# Patient Record
Sex: FEMALE | Race: WHITE | Employment: OTHER | URBAN - METROPOLITAN AREA
[De-identification: names, ages, dates, MRNs, and addresses within clinical notes are randomized per-mention and may not be internally consistent; named-entity substitution may affect disease eponyms.]

---

## 2017-01-05 ENCOUNTER — HOSPITAL ENCOUNTER (OUTPATIENT)
Dept: SLEEP MEDICINE | Age: 67
Discharge: HOME | End: 2017-01-05
Payer: MEDICARE

## 2017-01-05 PROCEDURE — 95810 POLYSOM 6/> YRS 4/> PARAM: CPT

## 2017-03-30 ENCOUNTER — HOSPITAL ENCOUNTER (OUTPATIENT)
Dept: CT IMAGING | Age: 67
Discharge: HOME | End: 2017-03-30
Attending: INTERNAL MEDICINE
Payer: MEDICARE

## 2017-03-30 DIAGNOSIS — J30.89 SEASONAL ALLERGIC RHINITIS DUE TO OTHER ALLERGIC TRIGGER: Chronic | ICD-10-CM

## 2017-03-30 PROCEDURE — 70486 CT MAXILLOFACIAL W/O DYE: CPT

## 2017-10-09 ENCOUNTER — HOSPITAL ENCOUNTER (OUTPATIENT)
Dept: MAMMOGRAPHY | Age: 67
Discharge: HOME | End: 2017-10-09
Attending: INTERNAL MEDICINE
Payer: MEDICARE

## 2017-10-09 DIAGNOSIS — Z12.31 VISIT FOR SCREENING MAMMOGRAM: ICD-10-CM

## 2017-10-09 PROCEDURE — 77063 BREAST TOMOSYNTHESIS BI: CPT

## 2018-03-13 ENCOUNTER — HOSPITAL ENCOUNTER (OUTPATIENT)
Dept: SURGERY | Age: 68
Discharge: HOME | End: 2018-03-13
Payer: MEDICARE

## 2018-03-13 ENCOUNTER — HOSPITAL ENCOUNTER (OUTPATIENT)
Dept: PHYSICAL THERAPY | Age: 68
Discharge: HOME | End: 2018-03-13
Payer: MEDICARE

## 2018-03-13 VITALS
OXYGEN SATURATION: 99 % | DIASTOLIC BLOOD PRESSURE: 68 MMHG | HEIGHT: 66 IN | TEMPERATURE: 96.7 F | RESPIRATION RATE: 16 BRPM | WEIGHT: 174.6 LBS | HEART RATE: 78 BPM | SYSTOLIC BLOOD PRESSURE: 113 MMHG | BODY MASS INDEX: 28.06 KG/M2

## 2018-03-13 LAB
ANION GAP SERPL CALC-SCNC: 10 MMOL/L (ref 7–16)
APPEARANCE UR: CLEAR
APTT PPP: 31.6 SEC (ref 23.2–35.3)
BACTERIA SPEC CULT: NORMAL
BASOPHILS # BLD: 0 K/UL (ref 0–0.2)
BASOPHILS NFR BLD: 1 % (ref 0–2)
BILIRUB UR QL: NEGATIVE
BUN SERPL-MCNC: 16 MG/DL (ref 8–23)
CALCIUM SERPL-MCNC: 9.2 MG/DL (ref 8.3–10.4)
CHLORIDE SERPL-SCNC: 103 MMOL/L (ref 98–107)
CO2 SERPL-SCNC: 28 MMOL/L (ref 21–32)
COLOR UR: YELLOW
CREAT SERPL-MCNC: 1.05 MG/DL (ref 0.6–1)
DIFFERENTIAL METHOD BLD: NORMAL
EOSINOPHIL # BLD: 0.1 K/UL (ref 0–0.8)
EOSINOPHIL NFR BLD: 2 % (ref 0.5–7.8)
ERYTHROCYTE [DISTWIDTH] IN BLOOD BY AUTOMATED COUNT: 13.9 % (ref 11.9–14.6)
GLUCOSE SERPL-MCNC: 91 MG/DL (ref 65–100)
GLUCOSE UR STRIP.AUTO-MCNC: NEGATIVE MG/DL
HCT VFR BLD AUTO: 41.6 % (ref 35.8–46.3)
HGB BLD-MCNC: 13.3 G/DL (ref 11.7–15.4)
HGB UR QL STRIP: NEGATIVE
IMM GRANULOCYTES # BLD: 0 K/UL (ref 0–0.5)
IMM GRANULOCYTES NFR BLD AUTO: 0 % (ref 0–5)
INR PPP: 1
KETONES UR QL STRIP.AUTO: NEGATIVE MG/DL
LEUKOCYTE ESTERASE UR QL STRIP.AUTO: NEGATIVE
LYMPHOCYTES # BLD: 1.6 K/UL (ref 0.5–4.6)
LYMPHOCYTES NFR BLD: 35 % (ref 13–44)
MCH RBC QN AUTO: 27.7 PG (ref 26.1–32.9)
MCHC RBC AUTO-ENTMCNC: 32 G/DL (ref 31.4–35)
MCV RBC AUTO: 86.7 FL (ref 79.6–97.8)
MONOCYTES # BLD: 0.4 K/UL (ref 0.1–1.3)
MONOCYTES NFR BLD: 8 % (ref 4–12)
NEUTS SEG # BLD: 2.4 K/UL (ref 1.7–8.2)
NEUTS SEG NFR BLD: 54 % (ref 43–78)
NITRITE UR QL STRIP.AUTO: NEGATIVE
PH UR STRIP: 5.5 [PH] (ref 5–9)
PLATELET # BLD AUTO: 258 K/UL (ref 150–450)
PMV BLD AUTO: 11.2 FL (ref 10.8–14.1)
POTASSIUM SERPL-SCNC: 3.6 MMOL/L (ref 3.5–5.1)
PROT UR STRIP-MCNC: NEGATIVE MG/DL
PROTHROMBIN TIME: 12.9 SEC (ref 11.5–14.5)
RBC # BLD AUTO: 4.8 M/UL (ref 4.05–5.25)
SERVICE CMNT-IMP: NORMAL
SODIUM SERPL-SCNC: 141 MMOL/L (ref 136–145)
SP GR UR REFRACTOMETRY: 1 (ref 1–1.02)
UROBILINOGEN UR QL STRIP.AUTO: 0.2 EU/DL (ref 0.2–1)
WBC # BLD AUTO: 4.5 K/UL (ref 4.3–11.1)

## 2018-03-13 PROCEDURE — 87641 MR-STAPH DNA AMP PROBE: CPT | Performed by: ORTHOPAEDIC SURGERY

## 2018-03-13 PROCEDURE — 85730 THROMBOPLASTIN TIME PARTIAL: CPT | Performed by: ORTHOPAEDIC SURGERY

## 2018-03-13 PROCEDURE — 80048 BASIC METABOLIC PNL TOTAL CA: CPT | Performed by: ORTHOPAEDIC SURGERY

## 2018-03-13 PROCEDURE — 85025 COMPLETE CBC W/AUTO DIFF WBC: CPT | Performed by: ORTHOPAEDIC SURGERY

## 2018-03-13 PROCEDURE — G8979 MOBILITY GOAL STATUS: HCPCS

## 2018-03-13 PROCEDURE — 81003 URINALYSIS AUTO W/O SCOPE: CPT | Performed by: ORTHOPAEDIC SURGERY

## 2018-03-13 PROCEDURE — G8980 MOBILITY D/C STATUS: HCPCS

## 2018-03-13 PROCEDURE — 97161 PT EVAL LOW COMPLEX 20 MIN: CPT

## 2018-03-13 PROCEDURE — G8978 MOBILITY CURRENT STATUS: HCPCS

## 2018-03-13 PROCEDURE — 85610 PROTHROMBIN TIME: CPT | Performed by: ORTHOPAEDIC SURGERY

## 2018-03-13 RX ORDER — FLUTICASONE PROPIONATE 50 MCG
2 SPRAY, SUSPENSION (ML) NASAL DAILY
COMMUNITY
End: 2018-07-26 | Stop reason: SDUPTHER

## 2018-03-13 NOTE — PROGRESS NOTES
03/13/18 0900   Oxygen Therapy   O2 Sat (%) 96 %   Pulse via Oximetry 74 beats per minute   O2 Device Room air   Pre-Treatment   Breath Sounds Bilateral Clear   Pre FEV1 (liters) 2.3 liters   % Predicted 119  (Pt states she has been doing a lot of swimming)   Incentive Spirometry Treatment   Actual Volume (ml) 3000 ml  (upfrom last [address] of [address])     Initial respiratory Assessment completed with pt. Pt was interviewed and evaluated in Joint camp prior to surgery. Patient ID:  Gregg Guzman  [id number]  79 y.o.  1950  Surgeon: Dr. Alexandro Palacios  Date of Surgery: 4/5/2018  Procedure: Total Left Hip Arthroplasty  Primary Care Physician: Curry Cevallos -[phone]  Specialists:                                  Pt instructed in the use of Incentive Spirometry. Pt instructed to bring Incentive Spirometer back on date of surgery & to start using Is upon return to pt room. Pt taught proper cough technique    History of smoking:   NONE                                       Quit date:            Secondhand smoke:FATHER      Past procedures with Oxygen desaturation:PT HAD JULIA DONE LAST JOINT CAMP ON 11/9/2016 WITH DESATURATIONS . PT HAS SLEEP STUDY ON 1/5/2017 WITH RESULTS SHOWING LOW SAT OF 85%.       Past Medical History:   Diagnosis Date    Anxiety state, unspecified 06/24/2011    Resolved     Chronic rhinitis 10/01/12    Disorder of bone and cartilage, unspecified 06/24/13    Dysfunction of eustachian tube 06/09/10    GERD (gastroesophageal reflux disease)     managed with medication     HLD (hyperlipidemia)     no meds currently    MR (mitral regurgitation)     hx of ECHO (7/25/2016)- mild MR, mild TR    Other synovitis and tenosynovitis 06/23/10    Peripheral vascular disease, unspecified 06/24/2011    pt denies    Seasonal allergic rhinitis     managed with medication     Unspecified tinnitus 06/13/2012    constant Respiratory history:DENIES SOB                                                             Respiratory meds:                                         FAMILY PRESENT:                                                          NO                                        PAST SLEEP STUDY:        YES                   1/5/2017   HX OF ARNOL:                  MILD WITH LOW SAT OF 85%. PT WOULD LIKE TO FOLLOW UP ON DR WESTFALL'S RECOMMENDATIONS AS NOTED ON SLEEP STUDY- ORAL APPLIANCE OR SLEEP POSITIONING. ARNOL assessment:                                               SLEEPS ON SIDE                                                         PHYSICAL EXAM   Body mass index is 28.61 kg/(m^2). Visit Vitals    /68 (BP 1 Location: Right arm, BP Patient Position: At rest;Sitting)    Pulse 78    Temp 96.7 °F (35.9 °C)    Resp 16    Ht 5' 5.5\" (1.664 m)    Wt 79.2 kg (174 lb 9.6 oz)    SpO2 99%    BMI 28.61 kg/m2     Neck circumference:   36   cm    Loud snoring:        YES                               Witnessed apnea or wakening gasping or choking:,             DENIES,                                                                                                WAKES SELF UP SNORING    Awakens with headaches:                                                  DENIES    Morning or daytime tiredness/ sleepiness:                                                                                                          TIRED   Dry mouth or sore throat in morning:           SOME                                                                       Talamantes stage:  4    SACS score:3    STOP/BANG:3                              CPAP:                       NONE                                                 O2 AT 3 LPM HS. DO NOT USE CONT SAT MONITOR PER PT REQUEST. Referrals:  [address]  Pt.  Phone Number:  [phone]    PT DESIRES CONSULTATION TO BE DONE THE FIRST OF June 2018

## 2018-03-13 NOTE — PERIOP NOTES
Patient verified name, , and surgery as listed in [facility]. Type 3 surgery, PAT joint assessment complete. Labs per surgeon: cbc, bmp, PT, PTT, MRSA nasal swab, UA; results pending. Labs per anesthesia protocol: no additional labs needed. EKG:Done 17 at [facility]- copy was not scanned into CC and was requested from office. Note in CC states EKG was NSR. Hibiclens and instructions to return bottle on DOS given per hospital policy. Nasal Swab collected per MD order and instructions for Mupirocin nasal ointment if required. Patient provided with handouts including Guide to Surgery, Pain Management, Hand Hygiene, Blood Transfusion Education, and [place] [facility] Brochure. Patient answered medical/surgical history questions at their best of ability. All prior to admission medications documented in [facility]. Original medication prescription bottle was visualized during patient appointment. Patient instructed to hold all vitamins 7 days prior to surgery and NSAIDS 5 days prior to surgery. Medications to be held: vitamins and supplements. Patient instructed to continue previous medications as prescribed prior to surgery and to take the following medications the day of surgery according to anesthesia guidelines with a small sip of water: flonase, zyrtec and nexium. Patient teach back successful and patient demonstrates knowledge of instruction.

## 2018-03-13 NOTE — ADVANCED PRACTICE NURSE
Total Joint Surgery Preoperative Chart Review      Patient ID:  Julian Joshua  [id number]  16 y.o.  1950  Surgeon: Dr. Jareth Liz  Date of Surgery: 4/5/2018  Procedure: Total Left Hip Arthroplasty  Primary Care Physician: Sunita Nicholas -[phone]  Specialty Physician(s):      Subjective: Julian Joshua is a 69 y.o. WHITE OR  female who presents for preoperative evaluation for Total Left Hip arthroplasty. This is a preoperative chart review note based on data collected by the nurse at the surgical Pre-Assessment visit.     Past Medical History:   Diagnosis Date    Anxiety state, unspecified 06/24/2011    Resolved     Chronic rhinitis 10/01/12    Disorder of bone and cartilage, unspecified 06/24/13    Dysfunction of eustachian tube 06/09/10    GERD (gastroesophageal reflux disease)     managed with medication     HLD (hyperlipidemia)     no meds currently    MR (mitral regurgitation)     hx of ECHO (7/25/2016)- mild MR, mild TR    Other synovitis and tenosynovitis 06/23/10    Peripheral vascular disease, unspecified 06/24/2011    pt denies    Seasonal allergic rhinitis     managed with medication     Unspecified tinnitus 06/13/2012    constant      Past Surgical History:   Procedure Laterality Date    HX COLONOSCOPY  1/23/2012    diverticulosis, internal hemorrhoids, Dr. Rodo Bustamante Right 11/2016    HX HYSTERECTOMY  2003    GIRISH    HX TONSILLECTOMY  childhood     Family History   Problem Relation Age of Onset    Cancer Mother 36     breast    Breast Cancer Mother 36    No Known Problems Daughter     No Known Problems Father     MS Brother     No Known Problems Brother     No Known Problems Daughter     No Known Problems Son       Social History   Substance Use Topics    Smoking status: Never Smoker    Smokeless tobacco: Never Used    Alcohol use 1.2 oz/week     2 Glasses of wine per week       Prior to Admission medications    Medication Sig Start Date End Date Taking? Authorizing Provider   fluticasone (FLONASE ALLERGY RELIEF) 50 mcg/actuation nasal spray 2 Sprays by Both Nostrils route daily. Yes Historical Provider   montelukast (SINGULAIR) 10 mg tablet Take 1 Tab by mouth daily. Indications: ALLERGIC RHINITIS  Patient taking differently: Take 10 mg by mouth nightly. Indications: Allergic Rhinitis 7/12/17  Yes Alirio Manzo NP   acetaminophen (TYLENOL EXTRA STRENGTH) 500 mg tablet Take 1,000 mg by mouth every six (6) hours as needed for Pain. 12/13/16  Yes Denise Herman MD   psyllium 0.52 gram capsule Take 1 Cap by mouth daily (with lunch). Yes Denise Herman MD   calcium-cholecalciferol, d3, (CALCIUM 600 + D) 600-125 mg-unit tab Take 1 Tab by mouth two (2) times a day. Yes Historical Provider   esomeprazole (NEXIUM) 20 mg capsule Take 1 Cap by mouth daily. 6/30/15  Yes Alirio Manzo NP   multivitamin (ONE A DAY) tablet Take 1 Tab by mouth every morning. Yes Historical Provider   omega-3 fatty acids-vitamin e (FISH OIL) 1,000 mg cap Take 2 Caps by mouth two (2) times a day. Yes Historical Provider   Biotin 2,500 mcg cap Take 1 Tab by mouth nightly. Yes Historical Provider   ciprofloxacin HCl (CIPRO) 500 mg tablet Take 1 Tab by mouth two (2) times a day. Patient not taking: Reported on 3/13/2018 12/29/17   Alirio Manzo NP   azelastine (ASTELIN) 137 mcg (0.1 %) nasal spray 1 [place] by Both Nostrils route two (2) times a day. Use in each nostril as directed  Patient not taking: Reported on 3/13/2018 12/29/17   Alirio Manzo NP   promethazine (PHENERGAN) 25 mg tablet Take 1 Tab by mouth every six (6) hours as needed for Nausea. Patient not taking: Reported on 3/13/2018 7/19/17   Alirio Manzo NP   fluticasone (FLONASE) 50 mcg/actuation nasal spray 2 Sprays by Both Nostrils route once for 1 dose.  Indications: ALLERGIC RHINITIS 7/12/17 7/12/17  Kenton Bors, NP   cetirizine (ZYRTEC) 10 mg tablet Take 10 mg by mouth daily. Historical Provider     Allergies   Allergen Reactions    Sulfa (Sulfonamide Antibiotics) Hives and Rash          Objective:     Physical Exam:   Patient Vitals for the past 24 hrs:   Temp Pulse Resp BP SpO2   03/13/18 1031 96.7 °F (35.9 °C) 78 16 113/68 99 %   03/13/18 0900 - - - - 96 %       ECG:    EKG Results     None          Data Review:   Labs:   Recent Results (from the past 24 hour(s))   CBC WITH AUTOMATED DIFF    Collection Time: 03/13/18  9:22 AM   Result Value Ref Range    WBC 4.5 4.3 - 11.1 K/uL    RBC 4.80 4.05 - 5.25 M/uL    HGB 13.3 11.7 - 15.4 g/dL    HCT 41.6 35.8 - 46.3 %    MCV 86.7 79.6 - 97.8 FL    MCH 27.7 26.1 - 32.9 PG    MCHC 32.0 31.4 - 35.0 g/dL    RDW 13.9 11.9 - 14.6 %    PLATELET 977 276 - 681 K/uL    MPV 11.2 10.8 - 14.1 FL    DF AUTOMATED      NEUTROPHILS 54 43 - 78 %    LYMPHOCYTES 35 13 - 44 %    MONOCYTES 8 4.0 - 12.0 %    EOSINOPHILS 2 0.5 - 7.8 %    BASOPHILS 1 0.0 - 2.0 %    IMMATURE GRANULOCYTES 0 0.0 - 5.0 %    ABS. NEUTROPHILS 2.4 1.7 - 8.2 K/UL    ABS. LYMPHOCYTES 1.6 0.5 - 4.6 K/UL    ABS. MONOCYTES 0.4 0.1 - 1.3 K/UL    ABS. EOSINOPHILS 0.1 0.0 - 0.8 K/UL    ABS. BASOPHILS 0.0 0.0 - 0.2 K/UL    ABS. IMM.  GRANS. 0.0 0.0 - 0.5 K/UL   METABOLIC PANEL, BASIC    Collection Time: 03/13/18  9:22 AM   Result Value Ref Range    Sodium 141 136 - 145 mmol/L    Potassium 3.6 3.5 - 5.1 mmol/L    Chloride 103 98 - 107 mmol/L    CO2 28 21 - 32 mmol/L    Anion gap 10 7 - 16 mmol/L    Glucose 91 65 - 100 mg/dL    BUN 16 8 - 23 MG/DL    Creatinine 1.05 (H) 0.6 - 1.0 MG/DL    GFR est AA >60 >60 ml/min/1.73m2    GFR est non-AA 55 (L) >60 ml/min/1.73m2    Calcium 9.2 8.3 - 10.4 MG/DL   PROTHROMBIN TIME + INR    Collection Time: 03/13/18  9:22 AM   Result Value Ref Range    Prothrombin time 12.9 11.5 - 14.5 sec    INR 1.0     PTT    Collection Time: 03/13/18  9:22 AM   Result Value Ref Range    aPTT 31.6 23.2 - 35.3 SEC   URINALYSIS W/ RFLX MICROSCOPIC    Collection Time: 03/13/18  9:22 AM   Result Value Ref Range    Color YELLOW      Appearance CLEAR      Specific gravity 1.003 1.001 - 1.023      pH (UA) 5.5 5.0 - 9.0      Protein NEGATIVE  NEG mg/dL    Glucose NEGATIVE  mg/dL    Ketone NEGATIVE  NEG mg/dL    Bilirubin NEGATIVE  NEG      Blood NEGATIVE  NEG      Urobilinogen 0.2 0.2 - 1.0 EU/dL    Nitrites NEGATIVE  NEG      Leukocyte Esterase NEGATIVE  NEG           Problem List:  )  Patient Active Problem List   Diagnosis Code    Seasonal allergic rhinitis J30.2    HLD (hyperlipidemia) E78.5    GERD (gastroesophageal reflux disease) K21.9    MR (mitral regurgitation) I34.0    Cyst of tendon sheath M67.80    Fatigue R53.83    Snoring R06.83    CKD (chronic kidney disease) stage 3, GFR 30-59 ml/min N18.3    Osteoarthritis M19.90    History of total right hip arthroplasty Z96.641       Total Joint Surgery Pre-Assessment Recommendations:            Patient had sleep study 1/5/2017 showing low saturations 85%. The notes wanted her to follow up. She did not get that information, therefore, She did not follow up. We will refer for consult with sleep medicine physican. Recommend oxygen saturation monitoring during hospitalization and oxygen at 3 liter via [facility]. Patient with multiple comorbidities including:  Age greater than 72 with CKD3, GERD, anxiety and a sleep disorder.   Patient would benefit from inpatient hospitalization with total knee surgery.          Signed By: SCOTT rCuz    March 13, 2018

## 2018-03-13 NOTE — PROGRESS NOTES
Gregg Guzman  : [birth year](88 y.o.) [facility] at [address], [place] [address].  [phone]       Physical Therapy Prehab Plan of Treatment and Evaluation Summary:3/13/2018    ICD-10: Treatment Diagnosis:   · Pain in left hip (M25.552)  · Stiffness of Left Hip, Not elsewhere classified (M25.652)  · Difficulty in walking, Not elsewhere classified (R26.2)  Precautions/Allergies:   Sulfa (sulfonamide antibiotics)  MEDICAL/REFERRING DIAGNOSIS:  Unilateral primary osteoarthritis, left hip [M16.12]  REFERRING PHYSICIAN: Austine Goltz, MD  DATE OF SURGERY: 18   Assessment:   Comments:  Patient presents prior to L ERNIE. Patient underwent R ERNIE in  and reports it is doing \"great\". She notes pain in her L groin as well as the outside of the thigh/hip. Patient's goal is to return to normal activity. She is active and swims 3x/week. She plans on returning home at d/c with assist from her children. PROBLEM LIST (Impacting functional limitations):  Ms. Jarred Erwin presents with the following left lower extremity(s) problems:  1. Strength  2. Range of Motion  3. Home Exercise Program  4. Pain   INTERVENTIONS PLANNED:  1. Home Exercise Program  2. Educational Discussion     TREATMENT PLAN: Effective Dates: 3/13/2018 TO 3/13/2018. Frequency/Duration: Patient to continue to perform home exercise program at least twice per day up until her surgery. GOALS: (Goals have been discussed and agreed upon with patient.)  Discharge Goals: Time Frame: 1 Day  1. Patient will demonstrate independence with a home exercise program designed to increase strength, range of motion, balance, coordination, functional technique and pain control to minimize functional deficits and optimize patient for total joint replacement.   Rehabilitation Potential For Stated Goals: Good  Regarding Estella Hines's therapy, I certify that the treatment plan above will be carried out by a therapist or under their direction. Thank you for this referral,  Eliazar Porter PT               HISTORY:   Present Symptoms:  Pain Intensity 1: 5 (worst)  Pain Location 1: Hip  Pain Orientation 1: Left   History of Present Injury/Illness (Reason for Referral):  Medical/Referring Diagnosis: Unilateral primary osteoarthritis, left hip [M16.12]   Past Medical History/Comorbidities:   Ms. Manfred Mcmillan  has a past medical history of Anxiety state, unspecified (06/24/2011); Chronic rhinitis (10/01/12); Disorder of bone and cartilage, unspecified (06/24/13); Dysfunction of eustachian tube (06/09/10); GERD (gastroesophageal reflux disease); HLD (hyperlipidemia); MR (mitral regurgitation); Other synovitis and tenosynovitis (06/23/10); Peripheral vascular disease, unspecified (06/24/2011); Seasonal allergic rhinitis; and Unspecified tinnitus (06/13/2012). She also has no past medical history of Difficult intubation; Malignant hyperthermia due to anesthesia; Nausea & vomiting; Pseudocholinesterase deficiency; or Unspecified adverse effect of anesthesia. Ms. Manfred Mcmillan  has a past surgical history that includes hx colonoscopy (1/23/2012); hx tonsillectomy (childhood); hx hysterectomy (2003); and hx hip replacement (Right, 11/2016). Social History/Living Environment:   Home Environment: Private residence  # Steps to Enter: 3  Rails to Enter: No  One/Two Story Residence: One story  Living Alone: Yes  Support Systems: Child(karly)  Patient Expects to be Discharged to[de-identified] Private residence  Current DME Used/Available at Home: [address] [facility], [place], straight, Commode, bedside, Crutches, [address] [facility] chair, Walker, rolling  Tub or Shower Type: Tub/Shower combination  Work/Activity:  Patient is independent with mobility. She is retired.   Dominant Side:  RIGHT  Current Medications:  See Pre-assessment nursing note   Number of Personal Factors/Comorbidities that affect the Plan of Care: 0: LOW COMPLEXITY   EXAMINATION:   ADLs (Current Functional Status):   Ambulation:  [x] Independent  [] Walk Indoors Only  [] Walk Outdoors  [] Use Assistive Device  [] Use Wheelchair Only Dressing:  [x] Independent  Requires Assistance from Someone for:  [] Sock/Shoes  [] Pants  [] Everything   Bathing/Showering:   [x] Independent  [] Requires Assistance from Someone  [] Sponge Bath Only Household Activities:  [x] Routine house and yard work  [] Light Housework Only  [] None   Observation/Orthostatic Postural Assessment:       ROM/Flexibility:   Gross Assessment: Yes  AROM: Within functional limits (R LE)                LLE Assessment  LLE Assessment (WDL): Exception to WDL          Strength:   Gross Assessment: Yes  Strength: Within functional limits (R LE 5/5)       LLE Strength  L Hip Flexion: 5  L Hip ABduction: 5  L Knee Flexion: 5  L Knee Extension: 5  L Ankle Dorsiflexion: 5          Functional Mobility:    Gross Assessment: Yes  Coordination: Within functional limits  Sensation: Intact  Tone: Normal    Gait Description (WDL): Exceptions to WDL  Stand to Sit: Independent  Sit to Stand: Independent  Stand Pivot Transfers: Independent  Distance (ft): 1000 Feet (ft)  Ambulation - Level of Assistance: Independent  Gait Abnormalities: Antalgic          Balance:    Sitting: Intact  Standing: Intact   Body Structures Involved:  1. Joints  2. Muscles Body Functions Affected:  1. Movement Related Activities and Participation Affected:  1. Mobility   Number of elements that affect the Plan of Care: 4+: HIGH COMPLEXITY   CLINICAL PRESENTATION:   Presentation: Stable and uncomplicated: LOW COMPLEXITY   CLINICAL DECISION MAKING:   Outcome Measure: Tool Used: Lower Extremity Functional Scale (LEFS)  Score:  Initial: 45/80 Most Recent: X/80 (Date: -- )   Interpretation of Score: 20 questions each scored on a 5 point scale with 0 representing \"extreme difficulty or unable to perform\" and 4 representing \"no difficulty\". The lower the score, the greater the functional disability.  80/80 represents no disability. Minimal detectable change is 9 points. Score 80 79-65 64-49 48-33 32-17 16-1 0   Modifier CH CI CJ CK CL CM CN     ? Mobility - Walking and Moving Around:     - CURRENT STATUS: CK - 40%-59% impaired, limited or restricted    - GOAL STATUS: CK - 40%-59% impaired, limited or restricted    - D/C STATUS:  CK - 40%-59% impaired, limited or restricted  Medical Necessity:   · Ms. Hines is expected to optimize her lower extremity strength and ROM in preparation for joint replacement surgery. Reason for Services/Other Comments:  · Achieve baseline assesment of musculoskeletal system, functional mobility and home environment. , educate in PT HEP in preparation for surgery, educate in hospital plan of care. Use of outcome tool(s) and clinical judgement create a POC that gives a: Clear prediction of patient's progress: LOW COMPLEXITY   TREATMENT:   Treatment/Session Assessment:  Patient was instructed in PT- HEP to increase strength and ROM in LEs. Answered all questions. · Post session pain:  0/10  · Compliance with Program/Exercises: compliant all of the time.   Total Treatment Duration:  PT Patient Time In/Time Out  Time In: 0915  Time Out: [address] [facility]

## 2018-03-13 NOTE — PERIOP NOTES
EKG tracing from 7/6/17 received and within anesthesia guidelines- placed on chart. Lab results within anesthesia guidelines. Lab results sent to PCP per surgeon's request.     Recent Results (from the past 12 hour(s))   CBC WITH AUTOMATED DIFF    Collection Time: 03/13/18  9:22 AM   Result Value Ref Range    WBC 4.5 4.3 - 11.1 K/uL    RBC 4.80 4.05 - 5.25 M/uL    HGB 13.3 11.7 - 15.4 g/dL    HCT 41.6 35.8 - 46.3 %    MCV 86.7 79.6 - 97.8 FL    MCH 27.7 26.1 - 32.9 PG    MCHC 32.0 31.4 - 35.0 g/dL    RDW 13.9 11.9 - 14.6 %    PLATELET 193 980 - 006 K/uL    MPV 11.2 10.8 - 14.1 FL    DF AUTOMATED      NEUTROPHILS 54 43 - 78 %    LYMPHOCYTES 35 13 - 44 %    MONOCYTES 8 4.0 - 12.0 %    EOSINOPHILS 2 0.5 - 7.8 %    BASOPHILS 1 0.0 - 2.0 %    IMMATURE GRANULOCYTES 0 0.0 - 5.0 %    ABS. NEUTROPHILS 2.4 1.7 - 8.2 K/UL    ABS. LYMPHOCYTES 1.6 0.5 - 4.6 K/UL    ABS. MONOCYTES 0.4 0.1 - 1.3 K/UL    ABS. EOSINOPHILS 0.1 0.0 - 0.8 K/UL    ABS. BASOPHILS 0.0 0.0 - 0.2 K/UL    ABS. IMM.  GRANS. 0.0 0.0 - 0.5 K/UL   METABOLIC PANEL, BASIC    Collection Time: 03/13/18  9:22 AM   Result Value Ref Range    Sodium 141 136 - 145 mmol/L    Potassium 3.6 3.5 - 5.1 mmol/L    Chloride 103 98 - 107 mmol/L    CO2 28 21 - 32 mmol/L    Anion gap 10 7 - 16 mmol/L    Glucose 91 65 - 100 mg/dL    BUN 16 8 - 23 MG/DL    Creatinine 1.05 (H) 0.6 - 1.0 MG/DL    GFR est AA >60 >60 ml/min/1.73m2    GFR est non-AA 55 (L) >60 ml/min/1.73m2    Calcium 9.2 8.3 - 10.4 MG/DL   PROTHROMBIN TIME + INR    Collection Time: 03/13/18  9:22 AM   Result Value Ref Range    Prothrombin time 12.9 11.5 - 14.5 sec    INR 1.0     PTT    Collection Time: 03/13/18  9:22 AM   Result Value Ref Range    aPTT 31.6 23.2 - 35.3 SEC   URINALYSIS W/ RFLX MICROSCOPIC    Collection Time: 03/13/18  9:22 AM   Result Value Ref Range    Color YELLOW      Appearance CLEAR      Specific gravity 1.003 1.001 - 1.023      pH (UA) 5.5 5.0 - 9.0      Protein NEGATIVE  NEG mg/dL    Glucose NEGATIVE  mg/dL    Ketone NEGATIVE  NEG mg/dL    Bilirubin NEGATIVE  NEG      Blood NEGATIVE  NEG      Urobilinogen 0.2 0.2 - 1.0 EU/dL    Nitrites NEGATIVE  NEG      Leukocyte Esterase NEGATIVE  NEG

## 2018-03-14 NOTE — PERIOP NOTES
3/13/2018  4:50 PM - [place], Lab In Shots   Component Results   Component Value Flag Ref Range Units Status   Special Requests: NASAL     Final   Culture result:      Final   SA target not detected.                                 A MRSA NEGATIVE, SA NEGATIVE test result does not preclude MRSA or SA nasal colonization.

## 2018-04-02 NOTE — H&P
H&P    Patient ID:  Abiel Duong  [id number]  87 y.o.  1950  Surgeon:  Aminah Dinh MD  Date of Surgery: * No surgery date entered *  Procedure: Left Hip Total Arthroplasty  Primary Care Physician: Eugene Parry MD        Subjective: Abiel Duong is a 69 y.o. WHITE [place]  female who presents with Left Hip pain. She has history of Left Hip pain for several months ago. Symptoms worse with walking and relieved with rest. Conservative treatment consisting of  activity modification has not helped. The patient  lives with their family. The patients goal after surgery is improved pain and function.         Past Medical History:   Diagnosis Date    Anxiety state, unspecified 06/24/2011    Resolved     Chronic rhinitis 10/01/12    Disorder of bone and cartilage, unspecified 06/24/13    Dysfunction of eustachian tube 06/09/10    GERD (gastroesophageal reflux disease)     managed with medication     HLD (hyperlipidemia)     no meds currently    MR (mitral regurgitation)     hx of ECHO (7/25/2016)- mild MR, mild TR    Other synovitis and tenosynovitis 06/23/10    Peripheral vascular disease, unspecified ([address] 06/24/2011    pt denies    Seasonal allergic rhinitis     managed with medication     Unspecified tinnitus 06/13/2012    constant      Past Surgical History:   Procedure Laterality Date    HX COLONOSCOPY  1/23/2012    diverticulosis, internal hemorrhoids, Dr. Yanely Duffy Right 11/2016    HX HYSTERECTOMY  2003    GIRISH    HX TONSILLECTOMY  childhood     Family History   Problem Relation Age of Onset    Cancer Mother 36     breast    Breast Cancer Mother 36    No Known Problems Daughter     No Known Problems Father     MS Brother     No Known Problems Brother     No Known Problems Daughter     No Known Problems Son       Social History   Substance Use Topics    Smoking status: Never Smoker    Smokeless tobacco: Never Used    Alcohol use 1.2 oz/week 2 Glasses of wine per week       Prior to Admission medications    Medication Sig Start Date End Date Taking? Authorizing Provider   fluticasone (FLONASE ALLERGY RELIEF) 50 mcg/actuation nasal spray 2 Sprays by Both Nostrils route daily. Historical Provider   ciprofloxacin HCl (CIPRO) 500 mg tablet Take 1 Tab by mouth two (2) times a day. Patient not taking: Reported on 3/13/2018 12/29/17   Silvia Talbert NP   azelastine (ASTELIN) 137 mcg (0.1 %) nasal spray 1 [place] by Both Nostrils route two (2) times a day. Use in each nostril as directed  Patient not taking: Reported on 3/13/2018 12/29/17   Silvia Talbert NP   promethazine (PHENERGAN) 25 mg tablet Take 1 Tab by mouth every six (6) hours as needed for Nausea. Patient not taking: Reported on 3/13/2018 7/19/17   Silvia Talbert NP   fluticasone (FLONASE) 50 mcg/actuation nasal spray 2 Sprays by Both Nostrils route once for 1 dose. Indications: ALLERGIC RHINITIS 7/12/17 7/12/17  Silvia Talbert NP   montelukast (SINGULAIR) 10 mg tablet Take 1 Tab by mouth daily. Indications: ALLERGIC RHINITIS  Patient taking differently: Take 10 mg by mouth nightly. Indications: Allergic Rhinitis 7/12/17   Silvia Talbert NP   cetirizine (ZYRTEC) 10 mg tablet Take 10 mg by mouth daily. Historical Provider   acetaminophen (TYLENOL EXTRA STRENGTH) 500 mg tablet Take 1,000 mg by mouth every six (6) hours as needed for Pain. 12/13/16   Kristi Phillip MD   psyllium 0.52 gram capsule Take 1 Cap by mouth daily (with lunch). Kristi Phillip MD   calcium-cholecalciferol, d3, (CALCIUM 600 + D) 600-125 mg-unit tab Take 1 Tab by mouth two (2) times a day. Historical Provider   esomeprazole (NEXIUM) 20 mg capsule Take 1 Cap by mouth daily. 6/30/15   Silvia Talbert NP   multivitamin (ONE A DAY) tablet Take 1 Tab by mouth every morning. Historical Provider   omega-3 fatty acids-vitamin e (FISH OIL) 1,000 mg cap Take 2 Caps by mouth two (2) times a day. Historical Provider   Biotin 2,500 mcg cap Take 1 Tab by mouth nightly. Historical Provider     Allergies   Allergen Reactions    Sulfa (Sulfonamide Antibiotics) Hives and Rash        REVIEW OF SYSTEMS:  CONSTITUTIONAL: Denies fever, decreased appetite, weight loss/gain, night sweats or fatigue. HEENT: Denies vision or hearing changes. has glasses. denies hearing aids. CARDIAC: Denies CP, palpitations, rheumatic fever, murmur, peripheral edema, carotid artery disease or syncopal episodes. RESPIRATORY: Denies dyspnea on exertion, asthma, COPD or orthopnea. GI: Denies GERD, history of GI bleed or melena, PUD, hepatitis or cirrhosis. : Denies dysuria, hematuria. denies BPH symptoms. HEMATOLOGIC: Denies anemia or blood disorders. ENDOCRINE: Denies thyroid disease. MUSCULOSKELETAL: See HPI. NEUROLOGIC: Denies seizure, peripheral neuropathy or memory loss. PSYCH: Denies depression, anxiety or insomnia. SKIN: Denies rash or open sores. Objective:    PHYSICAL EXAM  GENERAL: No data found. EYES: PERRL. EOM intact. MOUTH:Teeth and Gums normal. NECK: Full ROM. Trachea midline. No thyromegaly or JVD. CARDIOVASCULAR: Regular rate and rhythm. No murmur or gallops. No carotid bruits. Peripheral pulses: radial 2 +, PT 2+, DP 2+ bilaterally. LUNGS: CTA bilaterally. No wheezes, rhonchi or rales. GI: positive BS. Abdomen nontender. NEUROLOGIC: Alert and oriented x 3. Bilateral equal strong had grasp and bilateral equal strong plantar flexion and dorsiflexion. GAIT: abnormal  MUSCULOSKELETAL: ROM: full range of motion. Tenderness: None. Crepitus: not present. SKIN: No rash, bruising, swelling, redness or warmth. Labs:  No results found for this or any previous visit (from the past 24 hour(s)). Xray Left Hip: Joint space narrowing    Assessment:  Advanced Left Hip Osteoarthritis. Total Left Hip Arthroplasty Indicated.   Patient Active Problem List   Diagnosis Code    Seasonal allergic rhinitis J30.2    HLD (hyperlipidemia) E78.5    GERD (gastroesophageal reflux disease) K21.9    MR (mitral regurgitation) I34.0    Cyst of tendon sheath M67.80    Fatigue R53.83    Snoring R06.83    CKD (chronic kidney disease) stage 3, GFR 30-59 ml/min N18.3    Osteoarthritis M19.90    History of total right hip arthroplasty Z96.641       Plan:  I have advised the patient of the risks and consequences, including possible complications of performing total joint replacement, as well as not doing this operation. The patient had the opportunity to ask questions and have them answered to their satisfaction.      Signed:  YUE Alcala 4/2/2018

## 2018-04-04 ENCOUNTER — ANESTHESIA EVENT (OUTPATIENT)
Dept: SURGERY | Age: 68
DRG: 470 | End: 2018-04-04
Payer: MEDICARE

## 2018-04-05 ENCOUNTER — HOSPITAL ENCOUNTER (INPATIENT)
Age: 68
LOS: 1 days | Discharge: HOME HEALTH | DRG: 470 | End: 2018-04-06
Attending: ORTHOPAEDIC SURGERY | Admitting: ORTHOPAEDIC SURGERY
Payer: MEDICARE

## 2018-04-05 ENCOUNTER — ANESTHESIA (OUTPATIENT)
Dept: SURGERY | Age: 68
DRG: 470 | End: 2018-04-05
Payer: MEDICARE

## 2018-04-05 ENCOUNTER — APPOINTMENT (OUTPATIENT)
Dept: GENERAL RADIOLOGY | Age: 68
DRG: 470 | End: 2018-04-05
Attending: ORTHOPAEDIC SURGERY
Payer: MEDICARE

## 2018-04-05 ENCOUNTER — HOME HEALTH ADMISSION (OUTPATIENT)
Dept: HOME HEALTH SERVICES | Facility: HOME HEALTH | Age: 68
End: 2018-04-05
Payer: MEDICARE

## 2018-04-05 DIAGNOSIS — Z96.642 H/O TOTAL HIP ARTHROPLASTY, LEFT: Primary | ICD-10-CM

## 2018-04-05 LAB
ABO + RH BLD: NORMAL
BLOOD GROUP ANTIBODIES SERPL: NORMAL
GLUCOSE BLD STRIP.AUTO-MCNC: 99 MG/DL (ref 65–100)
HGB BLD-MCNC: 11.6 G/DL (ref 11.7–15.4)
SPECIMEN EXP DATE BLD: NORMAL

## 2018-04-05 PROCEDURE — 94760 N-INVAS EAR/PLS OXIMETRY 1: CPT

## 2018-04-05 PROCEDURE — 77030018836 HC SOL IRR NACL ICUM -A: Performed by: ORTHOPAEDIC SURGERY

## 2018-04-05 PROCEDURE — 77030011640 HC PAD GRND REM COVD -A: Performed by: ORTHOPAEDIC SURGERY

## 2018-04-05 PROCEDURE — 77030013727 HC IRR FAN PULSVC ZIMM -B: Performed by: ORTHOPAEDIC SURGERY

## 2018-04-05 PROCEDURE — 77030018074 HC RTVR SUT ARTH4 S&N -B: Performed by: ORTHOPAEDIC SURGERY

## 2018-04-05 PROCEDURE — 77030008467 HC STPLR SKN COVD -B: Performed by: ORTHOPAEDIC SURGERY

## 2018-04-05 PROCEDURE — 77030012935 HC DRSG AQUACEL BMS -B: Performed by: ORTHOPAEDIC SURGERY

## 2018-04-05 PROCEDURE — 77030006777 HC BLD SAW OSC CNMD -B: Performed by: ORTHOPAEDIC SURGERY

## 2018-04-05 PROCEDURE — 74011250636 HC RX REV CODE- 250/636: Performed by: ANESTHESIOLOGY

## 2018-04-05 PROCEDURE — 86900 BLOOD TYPING SEROLOGIC ABO: CPT | Performed by: ORTHOPAEDIC SURGERY

## 2018-04-05 PROCEDURE — 77030035236 HC SUT PDS STRATFX BARB J&J -B: Performed by: ORTHOPAEDIC SURGERY

## 2018-04-05 PROCEDURE — 76060000034 HC ANESTHESIA 1.5 TO 2 HR: Performed by: ORTHOPAEDIC SURGERY

## 2018-04-05 PROCEDURE — 77030018547 HC SUT ETHBND1 J&J -B: Performed by: ORTHOPAEDIC SURGERY

## 2018-04-05 PROCEDURE — 86580 TB INTRADERMAL TEST: CPT | Performed by: ORTHOPAEDIC SURGERY

## 2018-04-05 PROCEDURE — 74011000250 HC RX REV CODE- 250: Performed by: ORTHOPAEDIC SURGERY

## 2018-04-05 PROCEDURE — 85018 HEMOGLOBIN: CPT | Performed by: ORTHOPAEDIC SURGERY

## 2018-04-05 PROCEDURE — 74011250637 HC RX REV CODE- 250/637: Performed by: ORTHOPAEDIC SURGERY

## 2018-04-05 PROCEDURE — 76010000162 HC OR TIME 1.5 TO 2 HR INTENSV-TIER 1: Performed by: ORTHOPAEDIC SURGERY

## 2018-04-05 PROCEDURE — 74011000250 HC RX REV CODE- 250

## 2018-04-05 PROCEDURE — 82962 GLUCOSE BLOOD TEST: CPT

## 2018-04-05 PROCEDURE — 74011250636 HC RX REV CODE- 250/636: Performed by: ORTHOPAEDIC SURGERY

## 2018-04-05 PROCEDURE — 77030007880 HC KT SPN EPDRL BBMI -B: Performed by: ANESTHESIOLOGY

## 2018-04-05 PROCEDURE — 76210000016 HC OR PH I REC 1 TO 1.5 HR: Performed by: ORTHOPAEDIC SURGERY

## 2018-04-05 PROCEDURE — 74011000258 HC RX REV CODE- 258: Performed by: ORTHOPAEDIC SURGERY

## 2018-04-05 PROCEDURE — 77030003665 HC NDL SPN BBMI -A: Performed by: ANESTHESIOLOGY

## 2018-04-05 PROCEDURE — 72170 X-RAY EXAM OF PELVIS: CPT

## 2018-04-05 PROCEDURE — 74011250636 HC RX REV CODE- 250/636

## 2018-04-05 PROCEDURE — 77030031139 HC SUT VCRL2 J&J -A: Performed by: ORTHOPAEDIC SURGERY

## 2018-04-05 PROCEDURE — 97161 PT EVAL LOW COMPLEX 20 MIN: CPT

## 2018-04-05 PROCEDURE — 74011000302 HC RX REV CODE- 302: Performed by: ORTHOPAEDIC SURGERY

## 2018-04-05 PROCEDURE — 77030034849: Performed by: ORTHOPAEDIC SURGERY

## 2018-04-05 PROCEDURE — 97165 OT EVAL LOW COMPLEX 30 MIN: CPT

## 2018-04-05 PROCEDURE — 0SRB04A REPLACEMENT OF LEFT HIP JOINT WITH CERAMIC ON POLYETHYLENE SYNTHETIC SUBSTITUTE, UNCEMENTED, OPEN APPROACH: ICD-10-PCS | Performed by: ORTHOPAEDIC SURGERY

## 2018-04-05 PROCEDURE — 77030020788: Performed by: ORTHOPAEDIC SURGERY

## 2018-04-05 PROCEDURE — 77030003666 HC NDL SPINAL BD -A: Performed by: ORTHOPAEDIC SURGERY

## 2018-04-05 PROCEDURE — 65270000029 HC RM PRIVATE

## 2018-04-05 PROCEDURE — 94762 N-INVAS EAR/PLS OXIMTRY CONT: CPT

## 2018-04-05 PROCEDURE — 36415 COLL VENOUS BLD VENIPUNCTURE: CPT | Performed by: ORTHOPAEDIC SURGERY

## 2018-04-05 PROCEDURE — 77030020782 HC GWN BAIR PAWS FLX 3M -B: Performed by: ANESTHESIOLOGY

## 2018-04-05 PROCEDURE — C1776 JOINT DEVICE (IMPLANTABLE): HCPCS | Performed by: ORTHOPAEDIC SURGERY

## 2018-04-05 PROCEDURE — 77030019940 HC BLNKT HYPOTHRM STRY -B: Performed by: ANESTHESIOLOGY

## 2018-04-05 PROCEDURE — 77030002966 HC SUT PDS J&J -A: Performed by: ORTHOPAEDIC SURGERY

## 2018-04-05 PROCEDURE — 77030032490 HC SLV COMPR SCD KNE COVD -B

## 2018-04-05 DEVICE — BIOLOX DELTA CERAMIC FEMORAL HEAD 32MM DIA +1 12/14 TAPER
Type: IMPLANTABLE DEVICE | Site: HIP | Status: FUNCTIONAL
Brand: BIOLOX DELTA

## 2018-04-05 DEVICE — APEX HOLE ELIMINATOR - PS
Type: IMPLANTABLE DEVICE | Site: HIP | Status: FUNCTIONAL
Brand: APEX

## 2018-04-05 DEVICE — IMPLANTABLE DEVICE: Type: IMPLANTABLE DEVICE | Site: HIP | Status: FUNCTIONAL

## 2018-04-05 DEVICE — PINNACLE 100 ACETABULAR SHELL GRIPTION 100 54MM OD
Type: IMPLANTABLE DEVICE | Site: HIP | Status: FUNCTIONAL
Brand: PINNACLE GRIPTION

## 2018-04-05 DEVICE — SUMMIT FEMORAL STEM 12/14 TAPER TAPER ED W/POROCOAT SIZE 5 STD 145MM
Type: IMPLANTABLE DEVICE | Site: HIP | Status: FUNCTIONAL
Brand: SUMMIT POROCOAT

## 2018-04-05 RX ORDER — SODIUM CHLORIDE 9 MG/ML
INJECTION INTRAMUSCULAR; INTRAVENOUS; SUBCUTANEOUS AS NEEDED
Status: DISCONTINUED | OUTPATIENT
Start: 2018-04-05 | End: 2018-04-05 | Stop reason: HOSPADM

## 2018-04-05 RX ORDER — CALCIUM CARBONATE/VITAMIN D3 250-3.125
1 TABLET ORAL 2 TIMES DAILY
Status: DISCONTINUED | OUTPATIENT
Start: 2018-04-05 | End: 2018-04-06 | Stop reason: HOSPADM

## 2018-04-05 RX ORDER — SODIUM CHLORIDE, SODIUM LACTATE, POTASSIUM CHLORIDE, CALCIUM CHLORIDE 600; 310; 30; 20 MG/100ML; MG/100ML; MG/100ML; MG/100ML
100 INJECTION, SOLUTION INTRAVENOUS CONTINUOUS
Status: DISCONTINUED | OUTPATIENT
Start: 2018-04-05 | End: 2018-04-05 | Stop reason: HOSPADM

## 2018-04-05 RX ORDER — LIDOCAINE HYDROCHLORIDE 10 MG/ML
0.1 INJECTION INFILTRATION; PERINEURAL AS NEEDED
Status: DISCONTINUED | OUTPATIENT
Start: 2018-04-05 | End: 2018-04-05 | Stop reason: HOSPADM

## 2018-04-05 RX ORDER — SODIUM CHLORIDE 0.9 % (FLUSH) 0.9 %
5-10 SYRINGE (ML) INJECTION EVERY 8 HOURS
Status: DISCONTINUED | OUTPATIENT
Start: 2018-04-05 | End: 2018-04-06 | Stop reason: HOSPADM

## 2018-04-05 RX ORDER — DIPHENHYDRAMINE HCL 25 MG
25 CAPSULE ORAL
Status: DISCONTINUED | OUTPATIENT
Start: 2018-04-05 | End: 2018-04-06 | Stop reason: HOSPADM

## 2018-04-05 RX ORDER — ACETAMINOPHEN 500 MG
1000 TABLET ORAL ONCE
Status: DISCONTINUED | OUTPATIENT
Start: 2018-04-05 | End: 2018-04-05 | Stop reason: HOSPADM

## 2018-04-05 RX ORDER — MONTELUKAST SODIUM 10 MG/1
10 TABLET ORAL
Status: DISCONTINUED | OUTPATIENT
Start: 2018-04-05 | End: 2018-04-06 | Stop reason: HOSPADM

## 2018-04-05 RX ORDER — BUPIVACAINE HYDROCHLORIDE 7.5 MG/ML
INJECTION, SOLUTION INTRASPINAL AS NEEDED
Status: DISCONTINUED | OUTPATIENT
Start: 2018-04-05 | End: 2018-04-05 | Stop reason: HOSPADM

## 2018-04-05 RX ORDER — SODIUM CHLORIDE 0.9 % (FLUSH) 0.9 %
5-10 SYRINGE (ML) INJECTION AS NEEDED
Status: DISCONTINUED | OUTPATIENT
Start: 2018-04-05 | End: 2018-04-05 | Stop reason: HOSPADM

## 2018-04-05 RX ORDER — ONDANSETRON 2 MG/ML
INJECTION INTRAMUSCULAR; INTRAVENOUS AS NEEDED
Status: DISCONTINUED | OUTPATIENT
Start: 2018-04-05 | End: 2018-04-05 | Stop reason: HOSPADM

## 2018-04-05 RX ORDER — FENTANYL CITRATE 50 UG/ML
100 INJECTION, SOLUTION INTRAMUSCULAR; INTRAVENOUS ONCE
Status: DISCONTINUED | OUTPATIENT
Start: 2018-04-05 | End: 2018-04-05 | Stop reason: HOSPADM

## 2018-04-05 RX ORDER — ONDANSETRON 8 MG/1
8 TABLET, ORALLY DISINTEGRATING ORAL
Status: DISCONTINUED | OUTPATIENT
Start: 2018-04-05 | End: 2018-04-06 | Stop reason: HOSPADM

## 2018-04-05 RX ORDER — ZOLPIDEM TARTRATE 5 MG/1
5 TABLET ORAL
Status: DISCONTINUED | OUTPATIENT
Start: 2018-04-05 | End: 2018-04-06 | Stop reason: HOSPADM

## 2018-04-05 RX ORDER — MIDAZOLAM HYDROCHLORIDE 1 MG/ML
2 INJECTION, SOLUTION INTRAMUSCULAR; INTRAVENOUS ONCE
Status: COMPLETED | OUTPATIENT
Start: 2018-04-05 | End: 2018-04-05

## 2018-04-05 RX ORDER — OXYCODONE HYDROCHLORIDE 5 MG/1
5-10 TABLET ORAL
Status: DISCONTINUED | OUTPATIENT
Start: 2018-04-05 | End: 2018-04-06 | Stop reason: HOSPADM

## 2018-04-05 RX ORDER — AMOXICILLIN 250 MG
2 CAPSULE ORAL DAILY
Status: DISCONTINUED | OUTPATIENT
Start: 2018-04-06 | End: 2018-04-06 | Stop reason: HOSPADM

## 2018-04-05 RX ORDER — HYDROMORPHONE HYDROCHLORIDE 2 MG/ML
0.5 INJECTION, SOLUTION INTRAMUSCULAR; INTRAVENOUS; SUBCUTANEOUS
Status: DISCONTINUED | OUTPATIENT
Start: 2018-04-05 | End: 2018-04-05 | Stop reason: HOSPADM

## 2018-04-05 RX ORDER — FLUTICASONE PROPIONATE 50 MCG
2 SPRAY, SUSPENSION (ML) NASAL ONCE
Status: DISCONTINUED | OUTPATIENT
Start: 2018-04-05 | End: 2018-04-05 | Stop reason: SDUPTHER

## 2018-04-05 RX ORDER — SODIUM CHLORIDE 0.9 % (FLUSH) 0.9 %
5-10 SYRINGE (ML) INJECTION EVERY 8 HOURS
Status: DISCONTINUED | OUTPATIENT
Start: 2018-04-05 | End: 2018-04-05 | Stop reason: HOSPADM

## 2018-04-05 RX ORDER — ACETAMINOPHEN 10 MG/ML
1000 INJECTION, SOLUTION INTRAVENOUS ONCE
Status: COMPLETED | OUTPATIENT
Start: 2018-04-05 | End: 2018-04-05

## 2018-04-05 RX ORDER — EPHEDRINE SULFATE 50 MG/ML
INJECTION, SOLUTION INTRAVENOUS AS NEEDED
Status: DISCONTINUED | OUTPATIENT
Start: 2018-04-05 | End: 2018-04-05 | Stop reason: HOSPADM

## 2018-04-05 RX ORDER — MIDAZOLAM HYDROCHLORIDE 1 MG/ML
INJECTION, SOLUTION INTRAMUSCULAR; INTRAVENOUS AS NEEDED
Status: DISCONTINUED | OUTPATIENT
Start: 2018-04-05 | End: 2018-04-05 | Stop reason: HOSPADM

## 2018-04-05 RX ORDER — ACETAMINOPHEN 500 MG
1000 TABLET ORAL EVERY 6 HOURS
Status: DISCONTINUED | OUTPATIENT
Start: 2018-04-06 | End: 2018-04-06 | Stop reason: HOSPADM

## 2018-04-05 RX ORDER — PANTOPRAZOLE SODIUM 40 MG/1
40 TABLET, DELAYED RELEASE ORAL
Status: DISCONTINUED | OUTPATIENT
Start: 2018-04-06 | End: 2018-04-06 | Stop reason: HOSPADM

## 2018-04-05 RX ORDER — DEXAMETHASONE SODIUM PHOSPHATE 100 MG/10ML
10 INJECTION INTRAMUSCULAR; INTRAVENOUS ONCE
Status: DISCONTINUED | OUTPATIENT
Start: 2018-04-06 | End: 2018-04-06 | Stop reason: HOSPADM

## 2018-04-05 RX ORDER — PROPOFOL 10 MG/ML
INJECTION, EMULSION INTRAVENOUS
Status: DISCONTINUED | OUTPATIENT
Start: 2018-04-05 | End: 2018-04-05 | Stop reason: HOSPADM

## 2018-04-05 RX ORDER — ASPIRIN 81 MG/1
81 TABLET ORAL EVERY 12 HOURS
Status: DISCONTINUED | OUTPATIENT
Start: 2018-04-05 | End: 2018-04-06 | Stop reason: HOSPADM

## 2018-04-05 RX ORDER — CEFAZOLIN SODIUM/WATER 2 G/20 ML
2 SYRINGE (ML) INTRAVENOUS ONCE
Status: COMPLETED | OUTPATIENT
Start: 2018-04-05 | End: 2018-04-05

## 2018-04-05 RX ORDER — NALOXONE HYDROCHLORIDE 0.4 MG/ML
.2-.4 INJECTION, SOLUTION INTRAMUSCULAR; INTRAVENOUS; SUBCUTANEOUS
Status: DISCONTINUED | OUTPATIENT
Start: 2018-04-05 | End: 2018-04-06 | Stop reason: HOSPADM

## 2018-04-05 RX ORDER — NALOXONE HYDROCHLORIDE 0.4 MG/ML
0.2 INJECTION, SOLUTION INTRAMUSCULAR; INTRAVENOUS; SUBCUTANEOUS AS NEEDED
Status: DISCONTINUED | OUTPATIENT
Start: 2018-04-05 | End: 2018-04-05 | Stop reason: HOSPADM

## 2018-04-05 RX ORDER — AZELASTINE 1 MG/ML
1 SPRAY, METERED NASAL 2 TIMES DAILY
Status: DISCONTINUED | OUTPATIENT
Start: 2018-04-05 | End: 2018-04-05

## 2018-04-05 RX ORDER — LORATADINE 10 MG/1
10 TABLET ORAL DAILY
Status: DISCONTINUED | OUTPATIENT
Start: 2018-04-05 | End: 2018-04-05

## 2018-04-05 RX ORDER — OXYCODONE HYDROCHLORIDE 5 MG/1
5 TABLET ORAL
Status: DISCONTINUED | OUTPATIENT
Start: 2018-04-05 | End: 2018-04-05 | Stop reason: HOSPADM

## 2018-04-05 RX ORDER — CELECOXIB 200 MG/1
200 CAPSULE ORAL EVERY 12 HOURS
Status: DISCONTINUED | OUTPATIENT
Start: 2018-04-05 | End: 2018-04-06 | Stop reason: HOSPADM

## 2018-04-05 RX ORDER — OXYCODONE HYDROCHLORIDE 5 MG/1
10 TABLET ORAL
Status: DISCONTINUED | OUTPATIENT
Start: 2018-04-05 | End: 2018-04-05 | Stop reason: HOSPADM

## 2018-04-05 RX ORDER — DIPHENHYDRAMINE HYDROCHLORIDE 50 MG/ML
12.5 INJECTION, SOLUTION INTRAMUSCULAR; INTRAVENOUS
Status: DISCONTINUED | OUTPATIENT
Start: 2018-04-05 | End: 2018-04-05 | Stop reason: HOSPADM

## 2018-04-05 RX ORDER — SODIUM CHLORIDE 0.9 % (FLUSH) 0.9 %
5-10 SYRINGE (ML) INJECTION AS NEEDED
Status: DISCONTINUED | OUTPATIENT
Start: 2018-04-05 | End: 2018-04-06 | Stop reason: HOSPADM

## 2018-04-05 RX ORDER — ROPIVACAINE HYDROCHLORIDE 5 MG/ML
INJECTION, SOLUTION EPIDURAL; INFILTRATION; PERINEURAL AS NEEDED
Status: DISCONTINUED | OUTPATIENT
Start: 2018-04-05 | End: 2018-04-05 | Stop reason: HOSPADM

## 2018-04-05 RX ORDER — PROMETHAZINE HYDROCHLORIDE 25 MG/1
25 TABLET ORAL
Status: DISCONTINUED | OUTPATIENT
Start: 2018-04-05 | End: 2018-04-06 | Stop reason: HOSPADM

## 2018-04-05 RX ORDER — FLUTICASONE PROPIONATE 50 MCG
2 SPRAY, SUSPENSION (ML) NASAL DAILY
Status: DISCONTINUED | OUTPATIENT
Start: 2018-04-05 | End: 2018-04-05

## 2018-04-05 RX ORDER — SODIUM CHLORIDE 9 MG/ML
100 INJECTION, SOLUTION INTRAVENOUS CONTINUOUS
Status: DISCONTINUED | OUTPATIENT
Start: 2018-04-05 | End: 2018-04-06 | Stop reason: HOSPADM

## 2018-04-05 RX ORDER — HYDROMORPHONE HYDROCHLORIDE 2 MG/ML
1 INJECTION, SOLUTION INTRAMUSCULAR; INTRAVENOUS; SUBCUTANEOUS
Status: DISCONTINUED | OUTPATIENT
Start: 2018-04-05 | End: 2018-04-06 | Stop reason: HOSPADM

## 2018-04-05 RX ORDER — CEFAZOLIN SODIUM/WATER 2 G/20 ML
2 SYRINGE (ML) INTRAVENOUS EVERY 8 HOURS
Status: COMPLETED | OUTPATIENT
Start: 2018-04-05 | End: 2018-04-06

## 2018-04-05 RX ORDER — CIPROFLOXACIN 500 MG/1
500 TABLET ORAL 2 TIMES DAILY
Status: DISCONTINUED | OUTPATIENT
Start: 2018-04-05 | End: 2018-04-05

## 2018-04-05 RX ORDER — MIDAZOLAM HYDROCHLORIDE 1 MG/ML
2 INJECTION, SOLUTION INTRAMUSCULAR; INTRAVENOUS
Status: DISCONTINUED | OUTPATIENT
Start: 2018-04-05 | End: 2018-04-05 | Stop reason: HOSPADM

## 2018-04-05 RX ORDER — TRANEXAMIC ACID 100 MG/ML
INJECTION, SOLUTION INTRAVENOUS AS NEEDED
Status: DISCONTINUED | OUTPATIENT
Start: 2018-04-05 | End: 2018-04-05 | Stop reason: HOSPADM

## 2018-04-05 RX ORDER — FLUMAZENIL 0.1 MG/ML
0.2 INJECTION INTRAVENOUS
Status: DISCONTINUED | OUTPATIENT
Start: 2018-04-05 | End: 2018-04-05 | Stop reason: HOSPADM

## 2018-04-05 RX ORDER — DEXAMETHASONE SODIUM PHOSPHATE 4 MG/ML
INJECTION, SOLUTION INTRA-ARTICULAR; INTRALESIONAL; INTRAMUSCULAR; INTRAVENOUS; SOFT TISSUE AS NEEDED
Status: DISCONTINUED | OUTPATIENT
Start: 2018-04-05 | End: 2018-04-05 | Stop reason: HOSPADM

## 2018-04-05 RX ADMIN — ONDANSETRON 4 MG: 2 INJECTION INTRAMUSCULAR; INTRAVENOUS at 07:32

## 2018-04-05 RX ADMIN — EPHEDRINE SULFATE 5 MG: 50 INJECTION, SOLUTION INTRAVENOUS at 07:51

## 2018-04-05 RX ADMIN — OXYCODONE HYDROCHLORIDE 5 MG: 5 TABLET ORAL at 12:20

## 2018-04-05 RX ADMIN — EPHEDRINE SULFATE 5 MG: 50 INJECTION, SOLUTION INTRAVENOUS at 08:08

## 2018-04-05 RX ADMIN — SODIUM CHLORIDE 100 ML/HR: 900 INJECTION, SOLUTION INTRAVENOUS at 18:33

## 2018-04-05 RX ADMIN — MONTELUKAST SODIUM 10 MG: 10 TABLET, FILM COATED ORAL at 20:37

## 2018-04-05 RX ADMIN — ACETAMINOPHEN 1000 MG: 10 INJECTION, SOLUTION INTRAVENOUS at 18:24

## 2018-04-05 RX ADMIN — CALCIUM CARBONATE-CHOLECALCIFEROL TAB 250 MG-125 UNIT 1 TABLET: 250-125 TAB at 12:20

## 2018-04-05 RX ADMIN — MIDAZOLAM HYDROCHLORIDE 1 MG: 1 INJECTION, SOLUTION INTRAMUSCULAR; INTRAVENOUS at 07:25

## 2018-04-05 RX ADMIN — ASPIRIN 81 MG: 81 TABLET, COATED ORAL at 20:37

## 2018-04-05 RX ADMIN — Medication 2 G: at 16:00

## 2018-04-05 RX ADMIN — TUBERCULIN PURIFIED PROTEIN DERIVATIVE 5 UNITS: 5 INJECTION, SOLUTION INTRADERMAL at 05:59

## 2018-04-05 RX ADMIN — CELECOXIB 200 MG: 200 CAPSULE ORAL at 20:37

## 2018-04-05 RX ADMIN — OXYCODONE HYDROCHLORIDE 10 MG: 5 TABLET ORAL at 20:38

## 2018-04-05 RX ADMIN — OXYCODONE HYDROCHLORIDE 10 MG: 5 TABLET ORAL at 16:00

## 2018-04-05 RX ADMIN — CALCIUM CARBONATE-CHOLECALCIFEROL TAB 250 MG-125 UNIT 1 TABLET: 250-125 TAB at 18:24

## 2018-04-05 RX ADMIN — MIDAZOLAM HYDROCHLORIDE 1 MG: 1 INJECTION, SOLUTION INTRAMUSCULAR; INTRAVENOUS at 07:12

## 2018-04-05 RX ADMIN — MIDAZOLAM HYDROCHLORIDE 2 MG: 1 INJECTION, SOLUTION INTRAMUSCULAR; INTRAVENOUS at 06:53

## 2018-04-05 RX ADMIN — SODIUM CHLORIDE, SODIUM LACTATE, POTASSIUM CHLORIDE, AND CALCIUM CHLORIDE: 600; 310; 30; 20 INJECTION, SOLUTION INTRAVENOUS at 07:27

## 2018-04-05 RX ADMIN — Medication 2 G: at 07:13

## 2018-04-05 RX ADMIN — PROPOFOL 100 MCG/KG/MIN: 10 INJECTION, EMULSION INTRAVENOUS at 07:21

## 2018-04-05 RX ADMIN — BUPIVACAINE HYDROCHLORIDE 2 ML: 7.5 INJECTION, SOLUTION INTRASPINAL at 07:17

## 2018-04-05 RX ADMIN — TRANEXAMIC ACID 1000 MG: 100 INJECTION, SOLUTION INTRAVENOUS at 07:15

## 2018-04-05 RX ADMIN — DEXAMETHASONE SODIUM PHOSPHATE 10 MG: 4 INJECTION, SOLUTION INTRA-ARTICULAR; INTRALESIONAL; INTRAMUSCULAR; INTRAVENOUS; SOFT TISSUE at 07:32

## 2018-04-05 RX ADMIN — SODIUM CHLORIDE, SODIUM LACTATE, POTASSIUM CHLORIDE, AND CALCIUM CHLORIDE 100 ML/HR: 600; 310; 30; 20 INJECTION, SOLUTION INTRAVENOUS at 05:59

## 2018-04-05 RX ADMIN — SODIUM CHLORIDE 100 ML/HR: 900 INJECTION, SOLUTION INTRAVENOUS at 10:22

## 2018-04-05 RX ADMIN — HYDROMORPHONE HYDROCHLORIDE 1 MG: 2 INJECTION, SOLUTION INTRAMUSCULAR; INTRAVENOUS; SUBCUTANEOUS at 10:22

## 2018-04-05 RX ADMIN — PROMETHAZINE HYDROCHLORIDE 25 MG: 25 TABLET ORAL at 10:22

## 2018-04-05 NOTE — PROGRESS NOTES
Care Management Interventions  PCP Verified by CM: Yes  Mode of Transport at Discharge: Other (see comment)  Transition of Care Consult (CM Consult): [address]: Yes  Discharge Durable Medical Equipment: No  Physical Therapy Consult: Yes  Current Support Network: Own Home  Confirm Follow Up Transport: Family  Plan discussed with Pt/Family/Caregiver: Yes  Freedom of Choice Offered: Yes  Discharge Location  Discharge Placement: Home with home health  Patient is a 69y.o. year old female admitted for Left ERNIE . Patient lives with Her spouse and plans to return home on discharge. Order received to arrange home health. Patient without preference towards agency. Referral sent to [facility]. Patient denies any equipment needs as he has a walker and bedside commode. Will follow until discharge.

## 2018-04-05 NOTE — PROGRESS NOTES
Problem: Falls - Risk of  Goal: *Absence of Falls  Document Ly Fall Risk and appropriate interventions in the flowsheet.    Outcome: Progressing Towards Goal  Fall Risk Interventions:  Mobility Interventions: Patient to call before getting OOB         Medication Interventions: Patient to call before getting OOB    Elimination Interventions: Patient to call for help with toileting needs

## 2018-04-05 NOTE — INTERVAL H&P NOTE
H&P Update: Tree Ribera was seen and examined. History and physical has been reviewed. The patient has been examined.  There have been no significant clinical changes since the completion of the originally dated History and Physical.    Signed By: Fabienne President, PA     April 5, 2018 6:47 AM

## 2018-04-05 NOTE — PROGRESS NOTES
TRANSFER - IN REPORT:    Verbal report received from Ashley Christopher (name) on Carl Figueroa  being received from PACU (unit) for routine progression of care      Report consisted of patients Situation, Background, Assessment and   Recommendations(SBAR). Information from the following report(s) SBAR, Kardex, Intake/Output, MAR and Recent Results was reviewed with the receiving nurse. Opportunity for questions and clarification was provided. Assessment will be completed upon patients arrival to unit and care assumed.

## 2018-04-05 NOTE — OP NOTES
[facility]  Total Hip Procedure Note  Patient: Gabriela Alexander   : 1950  Medical Record [id number]      Pre-operative Diagnosis:  Unilateral primary osteoarthritis, left hip [M16.12]  Post-operative Diagnosis: Unilateral primary osteoarthritis, left hip [M16.12]    Surgeon: Chalo Kathleen MD  Assistant:Andres Bartlett PA-C    Anesthesia: Spinal      Procedure: Total Hip Arthroplasty   The complexity of the total joint surgery requires the use of a first assistant for positioning, retraction and assistance in closure. The patient's Body mass index is 28.61 kg/(m^2). , BMI's greater then 40 make surgical exposure and retraction extremely difficult and increase operative time. Gabriela Alexander was brought to the operating room and positioned on the operating table. She was anethestized with anesthesia. A galdamez catheter was placed preoperatively and IV antibiotics per CMS protocol was administered. Prior to the incision being made a timeout was called identifying the patient, procedure ,operative side and surgeon. Gabriela Alexander was positioned in the lateral decubitus position with the  left  side up. The limb was prepped and draped in the usual sterile fashion. The direct lateral approach was utilized to expose the hip. The incision was carried through the subcutaneous tissue and underlying fascia with homeostasis obtained using the bovie cauterization. A Charmley retractor was inserted. The abductors were taken down at the junction of the anterior and middle third. The sciatic nerve was palpated and identified. Following comparison of leg lengths, the femoral head was dislocated. The neck was osteotomized in the appropriate position just above the lesser trochanteric region. The acetabular retractor was placed and the appropriate capsulotomy performed. Soft tissue was removed from the acetabulum. The acetabulum was sequentially reamed.   Using trial components the acetabulum was sized to a 54 mm acetabular cup. Utilizing the femoral retractor, the canal was prepared with appropriate laterization and reamed with the starter reamer. The canal was progressively reamed to a 5 tapered reamer. The canal was then broached progressively to size 5. The calcar planar was untilized. A trial reduction with a size +1.0 neck length was utilized. The Head size was 32mm. This was found to be the most stable to flexion greater than 90 degrees and an internal and external rotation. Limb lengths were found to be equilibrated and with appropriate stability as mentioned above. All trial components were removed. The cementless permanent stem was impacted into place. A trial reduction was performed and the appropiate neck length was selected. A permanent 32mm femoral head was impacted into place. Eden Hines's hip was reduced and the stability was as mentioned as above. The betadine lavage protocol was used. The sciatic nerve was palpated and noted to be intact. The abductors were repaired through drill holes in the greater trochanter. The remainder was closed in layers with staples for the skin. The patient was then rolled to a supine position. The sponge and needle counts were correct. The patient tolerated the procedure without difficulty and left the operating room in satisfactory condition. EBL:300cc  Additional Findings: Severe DJD  Implants:   Implant Name Type Inv.  Item Serial No.  Lot No. LRB No. Used Action   CUP ACET PINN 100 W/ 54 --  - [id number]  CUP ACET PINN 100 W/ 54 --   [facility] [employer] [id number] Left 1 Implanted   CUP ACET PINN 100 W/ 54 --  - [id number]  CUP ACET PINN 100 W/ 54 --   [facility] [id number] Left 1 Implanted   PLUG ACET APCL H ELIM POS STP --  - [id number]  PLUG ACET APCL H ELIM POS STP --   [facility] [id number] Left 1 Implanted   STEM FEM 12/14 TAPR STD SZ 5 -- [place] - [id number]  STEM FEM 12/14 TAPR STD SZ 5 -- [place]  [facility] [employer] [id number] Left 1 Implanted   HEAD FEM CER 32MM +1MM NK -- [place] - [id number]   HEAD FEM CER 32MM +1MM NK -- [place]   [facility] [id number] Left 1 Implanted     Signed By: Pallavi Qiu MD

## 2018-04-05 NOTE — PROGRESS NOTES
Patient A/O denies any nausea or discomfort at this time, neurovascular checks all within defined limits, family present at bedside no needs.

## 2018-04-05 NOTE — ANESTHESIA PREPROCEDURE EVALUATION
Anesthetic History   No history of anesthetic complications            Review of Systems / Medical History  Patient summary reviewed, nursing notes reviewed and pertinent labs reviewed    Pulmonary  Within defined limits                 Neuro/Psych   Within defined limits           Cardiovascular      Valvular problems/murmurs (Mild): mitral insufficiency        Hyperlipidemia    Exercise tolerance: >4 METS     GI/Hepatic/Renal     GERD: well controlled           Endo/Other        Arthritis     Other Findings              Physical Exam    Airway  Mallampati: II  TM Distance: 4 - 6 cm  Neck ROM: normal range of motion   Mouth opening: Normal     Cardiovascular    Rhythm: regular  Rate: normal         Dental         Pulmonary  Breath sounds clear to auscultation               Abdominal         Other Findings            Anesthetic Plan    ASA: 2  Anesthesia type: spinal          Induction: Intravenous  Anesthetic plan and risks discussed with: Patient and Family

## 2018-04-05 NOTE — PROGRESS NOTES
Problem: Self Care Deficits Care Plan (Adult)  Goal: *Acute Goals and Plan of Care (Insert Text)  GOALS:   DISCHARGE GOALS (in preparation for going home/rehab):  3 days  1. Ms. Leandro Hassan will perform one lower body dressing activity with minimal assistance with adaptive equipment to demonstrate improved functional mobility and safety. 2.  Ms. Leandro Hassan will perform one lower body bathing activity with minimal  assistance with adaptive equipment to demonstrate improved functional mobility and safety. 3.  Ms. Leandro Hassan will perform toileting/toilet transfer with contact guard assistance with adaptive equipment to demonstrate improved functional mobility and safety. 4.  Ms. Leandro Hassan will perform shower transfer with contact guard assistance with adaptive equipment to demonstrate improved functional mobility and safety. 5.  Ms. Leandro Hassan will state ERNIE precautions with two verbal cues to demonstrate improved functional mobility and safety. [facility] OCCUPATIONAL THERAPY ERNIE: Initial Assessment and AM 4/5/2018  INPATIENT: Hospital Day: 1  [place] / [place] / Product Type: Medicare /      NAME/AGE/GENDER: Pat Birmingham is a 69 y.o. female   PRIMARY DIAGNOSIS:  Unilateral primary osteoarthritis, left hip [M16.12]   Procedure(s) and Anesthesia Type:     * LEFT HIP ARTHROPLASTY TOTAL / Rafa Heady / POD 1 - Spinal (Left)  ICD-10: Treatment Diagnosis:    · Pain in left hip (M25.552)  · Stiffness of Left Hip, Not elsewhere classified (M25.652)  · Other lack of cordination (R27.8)      ASSESSMENT:     Ms. Leandro Hassan is s/p left ERNIE and presents with decreased weight bearing on left LE and decreased independence with functional mobility and activities of daily living. Patient would benefit from skilled Occupational Therapy to maximize independence and safety with self-care task and functional mobility.   Pt would also benefit from education on lower body adaptive equipment and hip precautions post-surgery in preparation for going home or for recommendations for post-hospital rehab program.  Patient plans for further rehab at home with home health services and good family support. OT reviewed therapy schedule and plan of care with patient. Patient was able to transfer and perform self care skills as charted below. Patient instructed to call for assistance when needing to get up from the bed and all needs in reach. Patient verbalized understanding of call light. This section established at most recent assessment   PROBLEM LIST (Impairments causing functional limitations):  1. Decreased Strength  2. Decreased ADL/Functional Activities  3. Decreased Transfer Abilities  4. Increased Pain  5. Increased Fatigue  6. Decreased Flexibility/Joint Mobility  7. Decreased Knowledge of Precautions   INTERVENTIONS PLANNED: (Benefits and precautions of occupational therapy have been discussed with the patient.)  1. Activities of daily living training  2. Adaptive equipment training  3. Balance training  4. Clothing management  5. Donning&doffing training  6. Theraputic activity     TREATMENT PLAN: Frequency/Duration: Follow patient 1 time to address above goals. Rehabilitation Potential For Stated Goals: Good     RECOMMENDED REHABILITATION/EQUIPMENT: (at time of discharge pending progress): Continue Skilled Therapy and Home Health: Physical Therapy. OCCUPATIONAL PROFILE AND HISTORY:   History of Present Injury/Illness (Reason for Referral): Pt presents this date s/p (left) ERNIE. Past Medical History/Comorbidities:   Ms. Kamlesh Alcantara  has a past medical history of Anxiety state, unspecified (06/24/2011); Chronic rhinitis (10/01/12); Disorder of bone and cartilage, unspecified (06/24/13); Dysfunction of eustachian tube (06/09/10); GERD (gastroesophageal reflux disease); HLD (hyperlipidemia); MR (mitral regurgitation); Other synovitis and tenosynovitis (06/23/10);  Peripheral vascular disease, unspecified (RUSTca 75.) (06/24/2011); Seasonal allergic rhinitis; and Unspecified tinnitus (06/13/2012). She also has no past medical history of Difficult intubation; Malignant hyperthermia due to anesthesia; Nausea & vomiting; Pseudocholinesterase deficiency; or Unspecified adverse effect of anesthesia. Ms. Kevin Dowling  has a past surgical history that includes hx colonoscopy (1/23/2012); hx tonsillectomy (childhood); hx hysterectomy (2003); and hx hip replacement (Right, 11/2016). Social History/Living Environment:   Home Environment: Private residence  # Steps to Enter: 3  Rails to Enter: No  One/Two Story Residence: One story  Living Alone: Yes  Support Systems: Child(karly)  Patient Expects to be Discharged to[de-identified] Private residence  Current DME Used/Available at Home: [employer], straight, Commode, bedside, Crutches, Shower chair, Walker, rolling  Tub or Shower Type: Tub/Shower combination  Prior Level of Function/Work/Activity:  Mod I with ADLs     Number of Personal Factors/Comorbidities that affect the Plan of Care: Brief history (0):  LOW COMPLEXITY   ASSESSMENT OF OCCUPATIONAL PERFORMANCE[de-identified]   Most Recent Physical Functioning:   Balance  Sitting: Intact  Standing: Intact; With support       Gross Assessment: Yes  Gross Assessment  AROM: Within functional limits (except left lower extremity, s/p ERNIE)  Strength: Within functional limits (except left lower extremity, s/p ERNIE)            Coordination  Fine Motor Skills-Upper: Left Intact; Right Intact  Gross Motor Skills-Upper: Left Intact; Right Intact         Mental Status  Neurologic State: Alert; Appropriate for age  Orientation Level: Appropriate for age  Cognition: Appropriate decision making; Appropriate for age attention/concentration; Appropriate safety awareness; Follows commands  Perception: Appears intact  Perseveration: No perseveration noted  Safety/Judgement: Awareness of environment; Fall prevention                Basic ADLs (From Assessment) Complex ADLs (From Assessment)   Basic ADL  Feeding: Setup  Oral Facial Hygiene/Grooming: Supervision  Bathing: Moderate assistance  Upper Body Dressing: Supervision  Lower Body Dressing: Moderate assistance  Toileting: Total assistance (catheter)     Grooming/Bathing/Dressing Activities of Daily Living     Cognitive Retraining  Safety/Judgement: Awareness of environment; Fall prevention                 Functional Transfers  Toilet Transfer : Minimum assistance  Shower Transfer: Minimum assistance     Bed/Mat Mobility  Supine to Sit: Minimum assistance; Additional time  Sit to Supine: Minimum assistance  Sit to Stand: Minimum assistance (bed elevated)         Physical Skills Involved:  1. Balance  2. Strength  3. Activity Tolerance Cognitive Skills Affected (resulting in the inability to perform in a timely and safe manner): 1. none Psychosocial Skills Affected:  1. none   Number of elements that affect the Plan of Care: 1-3:  LOW COMPLEXITY   CLINICAL DECISION MAKIN[address] [place] [address] AM-PAC 6 Clicks   Daily Activity Inpatient Short Form  How much help from another person does the patient currently need. .. Total A Lot A Little None   1. Putting on and taking off regular lower body clothing? [] 1   [x] 2   [] 3   [] 4   2. Bathing (including washing, rinsing, drying)? [] 1   [x] 2   [] 3   [] 4   3. Toileting, which includes using toilet, bedpan or urinal?   [x] 1   [] 2   [] 3   [] 4   4. Putting on and taking off regular upper body clothing? [] 1   [] 2   [x] 3   [] 4   5. Taking care of personal grooming such as brushing teeth? [] 1   [] 2   [x] 3   [] 4   6. Eating meals? [] 1   [] 2   [] 3   [x] 4   © , Trustees of [address] [place] [id number], under license to [employer]. All rights reserved     Score:  Initial: 15 Most Recent: X (Date: -- )    Interpretation of Tool:  Represents activities that are increasingly more difficult (i.e. Bed mobility, Transfers, Gait).    Score 24 23 22-20 19-15 14-10 9-7 6     Modifier CH CI CJ CK CL CM CN      ? Self Care:     - CURRENT STATUS: CK - 40%-59% impaired, limited or restricted    - GOAL STATUS: CJ - 20%-39% impaired, limited or restricted    - D/C STATUS:  ---------------To be determined---------------  [place] / [place] / Product Type: Medicare /      Medical Necessity:     · Patient is expected to demonstrate progress in balance, coordination and functional technique to decrease assistance required with self care and functional mobility and improve safety during self care and functional mobility. Reason for Services/Other Comments:  · Patient continues to require skilled intervention due to decreased self care and functional mobility. Use of outcome tool(s) and clinical judgement create a POC that gives a: LOW COMPLEXITY            TREATMENT:   (In addition to Assessment/Re-Assessment sessions the following treatments were rendered)     Pre-treatment Symptoms/Complaints:    Pain: Initial:   Pain Intensity 1: 0  Post Session:  0/10 icepack     Assessment/Reassessment only, no treatment provided today    Treatment/Session Assessment:     Response to Treatment:  Tolerated well, plans to go home tomorrow. Education:  [] Home Exercises  [x] Fall Precautions  [x] Hip Precautions [] Going Home Video  [] Knee/Hip Prosthesis Review  [x] Walker Management/Safety [x] Adaptive Equipment as Needed       Interdisciplinary Collaboration:   o Physical Therapist  o Occupational Therapist  o Registered Nurse    After treatment position/precautions:   o Supine in bed  o Bed alarm/tab alert on  o Bed/Chair-wheels locked  o Bed in low position  o Call light within reach  o RN notified  o Family at bedside  o Side rails x 3     Compliance with Program/Exercises: compliant all of the time. Recommendations/Intent for next treatment session:  Treatment next visit will focus on increasing Ms. Hines's independence with bed mobility, transfers, self care, functional mobility, modalities for pain, and patient education.       Total Treatment Duration:  OT Patient Time In/Time Out  Time In: 1150  Time Out: Laila Dotson 38, OT

## 2018-04-05 NOTE — PERIOP NOTES
TRANSFER - OUT REPORT:    Verbal report given to Rodrigo Oswald  on [address]  being transferred to room 312 for routine post - op       Report consisted of patients Situation, Background, Assessment and   Recommendations(SBAR). Information from the following report(s) OR Summary, Procedure Summary, Intake/Output and MAR was reviewed with the receiving nurse. Opportunity for questions and clarification was provided.

## 2018-04-05 NOTE — PROGRESS NOTES
04/05/18 1313   Oxygen Therapy   O2 Sat (%) 91 %   Pulse via Oximetry 82 beats per minute   O2 Device Room air   Patient achieved   2500    Ml/sec on IS. Patient encouraged to do every hour while awake-patient agreed and demonstrated. No shortness of breath or distress noted. BS are clear b/l.    [facility] notes reviewed- continuous sat with no number assigned ordered HS

## 2018-04-05 NOTE — IP AVS SNAPSHOT
[address] 
[phone] Patient: Lico Golden MRN: [id number] HIR:[birth year] About your hospitalization You were admitted on:  April 5, 2018 You last received care in the:  [facility] You were discharged on:  April 6, 2018 Why you were hospitalized Your primary diagnosis was:  H/O Total Hip Arthroplasty, Left Your diagnoses also included:  Osteoarthritis Follow-up Information Follow up With Details Comments Contact Info Lulú Rodgers MD  As needed [address] [facility] [address] 
[phone] Lucho Hagan MD  As scheduled by office [address] [address] 
[phone] 7719  35 [facility]  Will contact you within 48 hrs [address] [facility] [address] 
[phone] Discharge Orders None A check amando indicates which time of day the medication should be taken. My Medications START taking these medications Instructions Each Dose to Equal  
 Morning Noon Evening Bedtime  
 aspirin delayed-release 81 mg tablet Your next dose is: Today Take 1 Tab by mouth every twelve (12) hours every twelve (12) hours for 30 days. 81 mg  
    
   
   
  
   
  
 oxyCODONE IR 5 mg immediate release tablet Commonly known as:  Lele Mary Your next dose is: Today Take 1-2 Tabs by mouth every three (3) hours as needed. Max Daily Amount: 80 mg.  
 5-10 mg CHANGE how you take these medications Instructions Each Dose to Equal  
 Morning Noon Evening Bedtime  
 montelukast 10 mg tablet Commonly known as:  EDITAULAENOC What changed:  when to take this Your next dose is:  Tomorrow Take 1 Tab by mouth daily. Indications: ALLERGIC RHINITIS  10 mg  
    
  
   
   
   
  
  
 CONTINUE taking these medications Instructions Each Dose to Equal  
 Morning Noon Evening Bedtime  
 azelastine 137 mcg (0.1 %) nasal spray Commonly known as:  ASTELIN Your next dose is: Today 1 [place] by Both Nostrils route two (2) times a day. Use in each nostril as directed 1 Spray Biotin 2,500 mcg Cap Your next dose is: Today Take 1 Tab by mouth nightly. 1 Tab CALCIUM 600 + D 600-125 mg-unit Tab Generic drug:  calcium-cholecalciferol (d3) Your next dose is: Today Take 1 Tab by mouth two (2) times a day. 1 Tab  
    
   
   
  
   
  
 ciprofloxacin HCl 500 mg tablet Commonly known as:  CIPRO Your next dose is: Today Take 1 Tab by mouth two (2) times a day. 500 mg  
    
   
   
  
   
  
 esomeprazole 20 mg capsule Commonly known as:  NexIUM Your next dose is:  Tomorrow Take 1 Cap by mouth daily. 20 mg  
    
  
   
   
   
  
 * FLONASE ALLERGY RELIEF 50 mcg/actuation nasal spray Generic drug:  fluticasone Your next dose is: Today 2 Sprays by Both Nostrils route daily. 2 Spray * fluticasone 50 mcg/actuation nasal spray Commonly known as:  Jairon Rojas Your next dose is:  DUPLICATE  
   
 2 Sprays by Both Nostrils route once for 1 dose. Indications: ALLERGIC RHINITIS 2 Spray  
    
   
   
   
  
 promethazine 25 mg tablet Commonly known as:  PHENERGAN Your next dose is: Today Take 1 Tab by mouth every six (6) hours as needed for Nausea. 25 mg  
    
   
   
  
   
  
 psyllium 0.52 gram capsule Your next dose is:  Tomorrow Take 1 Cap by mouth daily (with lunch). 1 Cap TYLENOL EXTRA STRENGTH 500 mg tablet Generic drug:  acetaminophen Your next dose is: Today Take 1,000 mg by mouth every six (6) hours as needed for Pain. 1000 mg  
    
   
   
  
   
  
 ZyrTEC 10 mg tablet Generic drug:  cetirizine Your next dose is:  Tomorrow Take 10 mg by mouth daily. 10 mg  
    
  
   
   
   
  
 * Notice: This list has 2 medication(s) that are the same as other medications prescribed for you. Read the directions carefully, and ask your doctor or other care provider to review them with you. STOP taking these medications FISH OIL 1,000 mg Cap Generic drug:  omega-3 fatty acids-vitamin e  
   
  
 multivitamin tablet Commonly known as:  ONE A DAY Where to Get Your Medications Information on where to get these meds will be given to you by the nurse or doctor. ! Ask your nurse or doctor about these medications  
  aspirin delayed-release 81 mg tablet  
 oxyCODONE IR 5 mg immediate release tablet Opioid Education Prescription Opioids: What You Need to Know: 
 
Prescription opioids can be used to help relieve moderate-to-severe pain and are often prescribed following a surgery or injury, or for certain health conditions. These medications can be an important part of treatment but also come with serious risks. Opioids are strong pain medicines. Examples include hydrocodone, oxycodone, fentanyl, and morphine. Heroin is an example of an illegal opioid. It is important to work with your health care provider to make sure you are getting the safest, most effective care. WHAT ARE THE RISKS AND SIDE EFFECTS OF OPIOID USE? Prescription opioids carry serious risks of addiction and overdose, especially with prolonged use. An opioid overdose, often marked by slow breathing, can cause sudden death. The use of prescription opioids can have a number of side effects as well, even when taken as directed. · Tolerance-meaning you might need to take more of a medication for the same pain relief · Physical dependence-meaning you have symptoms of withdrawal when the medication is stopped.   Withdrawal symptoms can include nausea, sweating, chills, diarrhea, stomach cramps, and muscle aches. Withdrawal can last up to several weeks, depending on which drug you took and how long you took it. · Increased sensitivity to pain · Constipation · Nausea, vomiting, and dry mouth · Sleepiness and dizziness · Confusion · Depression · Low levels of testosterone that can result in lower sex drive, energy, and strength · Itching and sweating RISKS ARE GREATER WITH:      
· History of drug misuse, substance use disorder, or overdose · Mental health conditions (such as depression or anxiety) · Sleep apnea · Older age (72 years or older) · Pregnancy Avoid alcohol while taking prescription opioids. Also, unless specifically advised by your health care provider, medications to avoid include: · Benzodiazepines (such as Xanax or Valium) · Muscle relaxants (such as Soma or Flexeril) · Hypnotics (such as Ambien or Lunesta) · Other prescription opioids KNOW YOUR OPTIONS Talk to your health care provider about ways to manage your pain that don't involve prescription opioids. Some of these options may actually work better and have fewer risks and side effects. Options may include: 
· Pain relievers such as acetaminophen, ibuprofen, and naproxen · Some medications that are also used for depression or seizures · Physical therapy and exercise · Counseling to help patients learn how to cope better with triggers of pain and stress. · Application of heat or cold compress · Massage therapy · Relaxation techniques Be Informed Make sure you know the name of your medication, how much and how often to take it, and its potential risks & side effects. IF YOU ARE PRESCRIBED OPIOIDS FOR PAIN: 
· Never take opioids in greater amounts or more often than prescribed. Remember the goal is not to be pain-free but to manage your pain at a tolerable level. · Follow up with your primary care provider to: · Work together to create a plan on how to manage your pain. · Talk about ways to help manage your pain that don't involve prescription opioids. · Talk about any and all concerns and side effects. · Help prevent misuse and abuse. · Never sell or share prescription opioids · Help prevent misuse and abuse. · Store prescription opioids in a secure place and out of reach of others (this may include visitors, children, friends, and family). · Safely dispose of unused/unwanted prescription opioids: Find your community drug take-back program or your pharmacy mail-back program, or flush them down the toilet, following guidance from the Food and Drug Administration (www.fda.gov/Drugs/ResourcesForYou). · Visit www.cdc.gov/drugoverdose to learn about the risks of opioid abuse and overdose. · If you believe you may be struggling with addiction, tell your health care provider and ask for guidance or call [address] [place] [address] [employer] at [phone]. Discharge Instructions [address] [place] [address] Patient Discharge Instructions Pao Forte / [id number] : 1950 Admitted 2018 Discharged: 2018 IF YOU HAVE ANY PROBLEMS ONCE YOU ARE AT HOME CALL THE FOLLOWING NUMBERS:  
Main office number: [phone] Take Home Medications · It is important that you take the medication exactly as they are prescribed. · Keep your medication in the bottles provided by the pharmacist and keep a list of the medication names, dosages, and times to be taken in your wallet. · Do not take other medications without consulting your doctor. What to do at [facility] Resume your prehospital diet. If you have excessive nausea or vomitting call your doctor's office Home Physical Therapy is arranged. Use rolling walker when walking.   
 
Patients who have had a joint replacement should not drive until you are seen for your follow up appointment by Dr. Karthik Everett. When to Call - Call if you have a temperature greater then 101 
- Unable to keep food down - Loose control of your bladder or bowel function - Are unable to bear any weight  
- Need a pain medication refill DISCHARGE SUMMARY from Nurse The following personal items collected during your admission are returned to you:  
Dental Appliance: Dental Appliances: None Vision: Visual Aid: Glasses Hearing Aid:   na 
Jewelry: Jewelry: None Clothing: Clothing: At bedside Other Valuables: Other Valuables: Cell Phone Valuables sent to safe: Personal Items Sent to Safe: n/a PATIENT INSTRUCTIONS: 
 
After general anesthesia or intravenous sedation, for 24 hours or while taking prescription Narcotics: · Limit your activities · Do not drive and operate hazardous machinery · Do not make important personal or business decisions · Do  not drink alcoholic beverages · If you have not urinated within 8 hours after discharge, please contact your surgeon on call. Report the following to your surgeon: 
· Excessive pain, swelling, redness or odor of or around the surgical area · Temperature over 101 · Nausea and vomiting lasting longer than 4 hours or if unable to take medications · Any signs of decreased circulation or nerve impairment to extremity: change in color, persistent  numbness, tingling, coldness or increase pain · Follow hip precautions @ all times! · Any questions, call office @ [phone] Keep scheduled follow up appointment. If need to change, call office @ [phone]. *  Please give a list of your current medications to your Primary Care Provider. *  Please update this list whenever your medications are discontinued, doses are 
    changed, or new medications (including over-the-counter products) are added. *  Please carry medication information at all times in case of emergency situations. Hip Replacement Surgery (Posterior): What to Expect at [facility] Your Recovery Hip replacement surgery replaces the worn parts of your hip joint. When you leave the hospital, you will probably be walking with crutches or a walker. You may be able to climb a few stairs and get in and out of bed and chairs. But you will need someone to help you at home for the next few weeks or until you have more energy and can move around better. If there is no one to help you at home, you may go to a rehabilitation center or long-term care center. You will go home with a bandage and stitches or staples. You can remove the bandage when your doctor tells you to. Your doctor will remove your stitches or staples 10 days to 3 weeks after your surgery. You may still have some mild pain, and the area may be swollen for 3 to 4 months after surgery. Your doctor will give you medicine for the pain. You will continue the rehabilitation program (rehab) you started in the hospital. The better you do with your rehab exercises, the sooner you will get your strength and movement back. Most people are able to return to work 4 weeks to 4 months after surgery. This care sheet gives you a general idea about how long it will take for you to recover. But each person recovers at a different pace. Follow the steps below to get better as quickly as possible. How can you care for yourself at home? Activity ? · Your doctor may not want your affected leg to cross the center of your body toward the other leg. If so, your therapist may suggest these ideas: ¨ Do not cross your legs. ¨ Be very careful as you get in or out of bed or a car, so your leg does not cross that imaginary line in the middle of your body. ? · Rest when you feel tired. You may take a nap, but do not stay in bed all day. ? · Work with your physical therapist to learn the best way to exercise. You may be able to take frequent, short walks using crutches or a walker. You will probably have to use crutches or a walker for at least 4 to 6 weeks. ? · Your doctor may advise you to stay away from activities that put stress on the joint. This includes sports such as tennis, football, and jogging. ? · Try not to sit for too long at one time. You will feel less stiff if you take a short walk about every hour. When you sit, use chairs with arms, and do not sit in low chairs. ? · Do not bend over more than 90 degrees (like the angle in a letter \"L\"). ? · Sleep on your back with your legs slightly apart or on your side with a pillow between your knees for about 6 weeks or as your doctor tells you. Do not sleep on your stomach or affected leg. ? · You may need to take sponge baths until your stitches or staples have been removed. You will probably be able to shower 24 hours after they are removed. ? · Ask your doctor when you can drive again. ? · Most people are able to return to work 4 weeks to 4 months after surgery. ? · Ask your doctor when it is okay for you to have sex. Diet ? · By the time you leave the hospital, you will probably be eating your normal diet. If your stomach is upset, try bland, low-fat foods like plain rice, broiled chicken, toast, and yogurt. Your doctor may recommend that you take iron and vitamin supplements. ? · Drink plenty of fluids (unless your doctor tells you not to). ? · Eat healthy foods, and watch your portion sizes. Try to stay at your ideal weight. Too much weight puts more stress on your new hip joint. ? · You may notice that your bowel movements are not regular right after your surgery. This is common. Try to avoid constipation and straining with bowel movements. You may want to take a fiber supplement every day. If you have not had a bowel movement after a couple of days, ask your doctor about taking a mild laxative. Medicines ? · Your doctor will tell you if and when you can restart your medicines. He or she will also give you instructions about taking any new medicines. ? · If you take blood thinners, such as warfarin (Coumadin), clopidogrel (Plavix), or aspirin, be sure to talk to your doctor. He or she will tell you if and when to start taking those medicines again. Make sure that you understand exactly what your doctor wants you to do.  
? · Your doctor may give you a blood-thinning medicine to prevent blood clots. If you take a blood thinner, be sure you get instructions about how to take your medicine safely. Blood thinners can cause serious bleeding problems. This medicine could be in pill form or as a shot (injection). If a shot is necessary, your doctor will tell you how to do this. ? · Be safe with medicines. Take pain medicines exactly as directed. ¨ If the doctor gave you a prescription medicine for pain, take it as prescribed. ¨ If you are not taking a prescription pain medicine, ask your doctor if you can take an over-the-counter medicine. ? · If you think your pain medicine is making you sick to your stomach: 
¨ Take your medicine after meals (unless your doctor has told you not to). ¨ Ask your doctor for a different pain medicine. ? · If your doctor prescribed antibiotics, take them as directed. Do not stop taking them just because you feel better. You need to take the full course of antibiotics. Incision care ? · You will have a bandage over the cut (incision) and staples or stitches. Follow your doctor's instructions on when to take the bandage off. Giving the incision air will help it heal.  
? · Your doctor will remove the staples or stitches 10 days to 3 weeks after the surgery and replace them with strips of tape. Leave the strips on for a week or until they fall off. Exercise ? · Your rehab program will include a number of exercises to do. Always do them as your therapist tells you. Ice and elevation ? · For pain, put ice or a cold pack on the area for 10 to 20 minutes at a time. Put a thin cloth between the ice and your skin. ? · Your ankle may swell for about 3 months. Prop up your ankle when you ice it or anytime you sit or lie down. Try to keep it above the level of your heart. This will help reduce swelling. Other instructions ? Continue to wear your support stockings as your doctor says. These help to prevent blood clots. The length of time that you will have to wear them depends on your activity level and the amount of swelling you have. Most people wear these stockings for 4 to 6 weeks after surgery. ?Preventing falls is also very important. To prevent falls: 
? · Arrange furniture so that you will not trip on it. ? · Get rid of throw rugs, and move electrical cords out of the way. ? · Walk only in areas with plenty of light. ? · Put grab bars in showers and bathtubs. ? · Avoid icy or snowy sidewalks. ? · Wear shoes with sturdy, flat soles. Follow-up care is a key part of your treatment and safety. Be sure to make and go to all appointments, and call your doctor if you are having problems. It's also a good idea to know your test results and keep a list of the medicines you take. When should you call for help? Call 911 anytime you think you may need emergency care. For example, call if: 
? · You passed out (lost consciousness). ? · You have severe trouble breathing. ? · You have sudden chest pain and shortness of breath, or you cough up blood. ?Call your doctor now or seek immediate medical care if: 
? · You have signs that your hip may be dislocated, including: ¨ Severe pain and not being able to stand. ¨ A crooked leg that looks like your hip is out of position. ¨ Not being able to bend or straighten your leg. ? · Your leg or foot is cool or pale or changes color. ? · You cannot feel or move your leg. ? · You have signs of a blood clot, such as: ¨ Pain in your calf, back of the knee, thigh, or groin. ¨ Redness and swelling in your leg or groin. ? · Your incision comes open and begins to bleed, or the bleeding increases. ? · You feel like your heart is racing or beating irregularly. ? · You have signs of infection, such as: 
¨ Increased pain, swelling, warmth, or redness. ¨ Red streaks leading from the incision. ¨ Pus draining from the incision. ¨ A fever. ? Watch closely for changes in your health, and be sure to contact your doctor if: 
? · You do not have a bowel movement after taking a laxative. ? · You do not get better as expected. Where can you learn more? Go to http://bryan-wilda.info/. Enter A663 in the search box to learn more about \"Hip Replacement Surgery (Posterior): What to Expect at Home. \" Current as of: March 21, 2017 Content Version: 11.4 © 3726-6104 [employer]. Care instructions adapted under license by [employer] (which disclaims liability or warranty for this information). If you have questions about a medical condition or this instruction, always ask your healthcare professional. [place] [address] any warranty or liability for your use of this information. These are general instructions for a healthy lifestyle: No smoking/ No tobacco products/ Avoid exposure to second hand smoke Surgeon General's Warning:  Quitting smoking now greatly reduces serious risk to your health. Obesity, smoking, and sedentary lifestyle greatly increases your risk for illness A healthy diet, regular physical exercise & weight monitoring are important for maintaining a healthy lifestyle You may be retaining fluid if you have a history of heart failure or if you experience any of the following symptoms:  Weight gain of 3 pounds or more overnight or 5 pounds in a week, increased swelling in our hands or feet or shortness of breath while lying flat in bed. Please call your doctor as soon as you notice any of these symptoms; do not wait until your next office visit. Recognize signs and symptoms of STROKE: 
 
F-face looks uneven A-arms unable to move or move even S-speech slurred or non-existent T-time-call 911 as soon as signs and symptoms begin-DO NOT go Back to bed or wait to see if you get better-TIME IS BRAIN. The discharge information has been reviewed with the patient. The patient verbalized understanding. Information obtained by : 
I understand that if any problems occur once I am at home I am to contact my physician. I understand and acknowledge receipt of the instructions indicated above. Physician's or R.N.'s Signature                                                                  Date/Time Patient or Representative Signature                                                          Date/Time [employer] Announcement We are excited to announce that we are making your provider's discharge notes available to you in [employer]. You will see these notes when they are completed and signed by the physician that discharged you from your recent hospital stay. If you have any questions or concerns about any information you see in [employer], please call the Health Information Department where you were seen or reach out to your Primary Care Provider for more information about your plan of care. Introducing [facility] [place] [facility]! Dear Rama Cherry: 
Thank you for requesting a [employer] account. Our records indicate that you already have an active [employer] account.   You can access your account anytime at [email or web address] Did you know that you can access your hospital and ER discharge instructions at any time in [employer]? You can also review all of your test results from your hospital stay or ER visit. Additional Information If you have questions, please visit the Frequently Asked Questions section of the [employer] website at [email or web address] Remember, Webymaster is NOT to be used for urgent needs. For medical emergencies, dial 911. Now available from your iPhone and Android! Introducing Jose Morris As a [employer] patient, I wanted to make you aware of our electronic visit tool called Jose Morris. [employer] allows you to connect within minutes with a medical provider 24 hours a day, seven days a week via a mobile device or tablet or logging into a secure website from your computer. You can access Jose Morris from anywhere in the [place]. A virtual visit might be right for you when you have a simple condition and feel like you just dont want to get out of bed, or cant get away from work for an appointment, when your regular [employer] provider is not available (evenings, weekends or holidays), or when youre out of town and need minor care. Electronic visits cost only $49 and if the [facility]ician [facility] [employer] provider determines a prescription is needed to treat your condition, one can be electronically transmitted to a nearby pharmacy*. Please take a moment to enroll today if you have not already done so. The enrollment process is free and takes just a few minutes. To enroll, please download the [facility]ician [facility] [employer] niurka to your tablet or phone, or visit [email or web address] to enroll on your computer.    
And, as an [address] [place] [address] patient with a [employer] account, the results of your visits will be scanned into your electronic medical record and your primary care provider will be able to view the scanned results. We urge you to continue to see your regular [facility] provider for your ongoing medical care. And while your primary care provider may not be the one available when you seek a [facility] virtual visit, the peace of mind you get from getting a real diagnosis real time can be priceless. For more information on [facility], view our Frequently Asked Questions (FAQs) at [email or web address]. Sincerely, 
 
Tiffanie Blackman MD 
Chief Medical Officer JlSarah Vital Jeremi *:  certain medications cannot be prescribed via [facility] Providers Seen During Your Hospitalization Provider Specialty Primary office phone Blayne Dale MD Orthopedic Surgery [phone] Immunizations Administered for This Admission Name Date  
 TB Skin Test (PPD) Intradermal 4/5/2018 Your Primary Care Physician (PCP) Primary Care Physician Office Phone Office Fax [facility] [phone] You are allergic to the following Allergen Reactions Sulfa (Sulfonamide Antibiotics) Hives Rash Recent Documentation Height Weight Breastfeeding? BMI OB Status Smoking Status 1.664 m 79.2 kg No 28.61 kg/m2 Hysterectomy Never Smoker Emergency Contacts Name Discharge Info Relation Home Work Mobile Jayden Pinedo DISCHARGE CAREGIVER [3] Daughter [21] [phone] Saloni Armenta DISCHARGE CAREGIVER [3] Daughter [21] [phone] Patient Belongings The following personal items are in your possession at time of discharge: 
  Dental Appliances: None  Visual Aid: Glasses   Hearing Aids/Status: Does not own  Home Medications: None   Jewelry: None  Clothing: At bedside    Other Valuables: Cell Phone  Personal Items Sent to Safe: n/a Please provide this summary of care documentation to your next provider. Signatures-by signing, you are acknowledging that this After Visit Summary has been reviewed with you and you have received a copy. Patient Signature:  ____________________________________________________________ Date:  ____________________________________________________________  
  
Sigmund [place] Provider Signature:  ____________________________________________________________ Date:  ____________________________________________________________

## 2018-04-05 NOTE — PROGRESS NOTES
Problem: Mobility Impaired (Adult and Pediatric)  Goal: *Acute Goals and Plan of Care (Insert Text)  GOALS (1-4 days):  (1.)Ms. Hines will move from supine to sit and sit to supine  in bed with STAND BY ASSIST.    (2.)Ms. Hines will transfer from bed to chair and chair to bed with STAND BY ASSIST using the least restrictive device. (3.)Ms. Hines will ambulate with STAND BY ASSIST for 300 feet with the least restrictive device. (4.)Ms. Hines will ambulate up/down 3 steps with bilateral  railing with CONTACT GUARD ASSIST with no device. (5.)Ms. Hines will state/observe ERNIE precautions with 0 verbal cues. ________________________________________________________________________________________________      PHYSICAL THERAPY [facility] [place]: Initial Assessment, Treatment Day: Day of Assessment, PM 4/5/2018  INPATIENT: Hospital Day: 1  [place] / [place] / Product Type: Medicare /      NAME/AGE/GENDER: Lubna Saldivar is a 69 y.o. female   PRIMARY DIAGNOSIS:  Unilateral primary osteoarthritis, left hip [M16.12]   Procedure(s) and Anesthesia Type:     * LEFT HIP ARTHROPLASTY TOTAL / Lajoyce Chiki / POD 1 - Spinal (Left)  ICD-10: Treatment Diagnosis:    · Pain in left hip (M25.552)  · Stiffness of Left Hip, Not elsewhere classified (M25.652)  · Difficulty in walking, Not elsewhere classified (R26.2)      ASSESSMENT:     Ms. Katalina Banerjee presents supine on contact and agreeable to therapy assessment. Pt s/p left ERNIE on 4/5/18 and presents with decreased strength and range of motion left lower extremity and with decreased independence with functional mobility. At home she was independent with mobility without assistive devices, she is below her baseline and will benefit from skilled PT interventions to maximize independence with functional mobility and ERNIE exercises. Hope to progress in the morning. She plans to go home with home health PT and family support.      This section established at most recent assessment   PROBLEM LIST (Impairments causing functional limitations):  1. Decreased Strength  2. Decreased ADL/Functional Activities  3. Decreased Transfer Abilities  4. Decreased Ambulation Ability/Technique  5. Increased Pain  6. Decreased Flexibility/Joint Mobility  7. Edema/Girth  8. Decreased Knowledge of Precautions  9. Decreased [place] with Home Exercise Program   INTERVENTIONS PLANNED: (Benefits and precautions of physical therapy have been discussed with the patient.)  1. Bed Mobility  2. Cold  3. Gait Training  4. Home Exercise Program (HEP)  5. Therapeutic Exercise/Strengthening  6. Transfer Training  7. Range of Motion: active/assisted/passive  8. Therapeutic Activities  9. Group Therapy     TREATMENT PLAN: Frequency/Duration: Follow patient BID for duration of hospital stay to address above goals. Rehabilitation Potential For Stated Goals: Good     RECOMMENDED REHABILITATION/EQUIPMENT: (at time of discharge pending progress): Continue Skilled Therapy and Home Health: Physical Therapy. HISTORY:   History of Present Injury/Illness (Reason for Referral):  Pt s/p left ERNIE on 4//  Past Medical History/Comorbidities:   Ms. Orlando Traore  has a past medical history of Anxiety state, unspecified (06/24/2011); Chronic rhinitis (10/01/12); Disorder of bone and cartilage, unspecified (06/24/13); Dysfunction of eustachian tube (06/09/10); GERD (gastroesophageal reflux disease); HLD (hyperlipidemia); MR (mitral regurgitation); Other synovitis and tenosynovitis (06/23/10); Peripheral vascular disease, unspecified ([address] (06/24/2011); Seasonal allergic rhinitis; and Unspecified tinnitus (06/13/2012). She also has no past medical history of Difficult intubation; Malignant hyperthermia due to anesthesia; Nausea & vomiting; Pseudocholinesterase deficiency; or Unspecified adverse effect of anesthesia.   Ms. Orlando Traore  has a past surgical history that includes hx colonoscopy (1/23/2012); hx tonsillectomy (childhood); hx hysterectomy (2003); and hx hip replacement (Right, 11/2016). Social History/Living Environment:   Home Environment: Private residence  # Steps to Enter: 3  Rails to Enter: No  One/Two Story Residence: One story  Living Alone: Yes  Support Systems: Child(karly)  Patient Expects to be Discharged to[de-identified] Private residence  Current DME Used/Available at Home: Reida Nipple, straight, Commode, bedside, Crutches, Shower chair, Walker, rolling  Tub or Shower Type: Tub/Shower combination  Prior Level of Function/Work/Activity:  Pt living at home, independent with gait and ADLs without assistive devices   Number of Personal Factors/Comorbidities that affect the Plan of Care: 0: LOW COMPLEXITY   EXAMINATION:   Most Recent Physical Functioning:   Gross Assessment: Yes  Gross Assessment  AROM: Within functional limits (except left lower extremity, s/p ERNIE)  Strength: Within functional limits (except left lower extremity, s/p ERNIE)                     Bed Mobility  Supine to Sit: Minimum assistance; Additional time  Sit to Supine: Minimum assistance    Transfers  Sit to Stand: Minimum assistance (bed elevated)  Stand to Sit: Minimum assistance    Balance  Sitting: Intact  Standing: Intact; With support    Posture  Posture (WDL): Within defined limits         Weight Bearing Status  Left Side Weight Bearing: As tolerated  Distance (ft): 3 Feet (ft)  Ambulation - Level of Assistance: Minimal assistance  Speed/[place]: Pace decreased (<100 feet/min)  Step Length: Right shortened  Stance: Left decreased  Gait Abnormalities: Antalgic        Braces/Orthotics: none    Left Hip Cold  Type: Cold/ice pack  Patient Position: Supine      Body Structures Involved:  1. Bones  2. Joints  3. Muscles Body Functions Affected:  1. Movement Related Activities and Participation Affected:  1. Mobility  2.  Self Care   Number of elements that affect the Plan of Care: 4+: HIGH COMPLEXITY   CLINICAL PRESENTATION:   Presentation: Stable and uncomplicated: LOW COMPLEXITY   CLINICAL DECISION MAKIN[address] [place] [id number] AM-PAC 6 Clicks   Basic Mobility Inpatient Short Form  How much difficulty does the patient currently have. .. Unable A Lot A Little None   1. Turning over in bed (including adjusting bedclothes, sheets and blankets)? [] 1   [] 2   [x] 3   [] 4   2. Sitting down on and standing up from a chair with arms ( e.g., wheelchair, bedside commode, etc.)   [] 1   [] 2   [x] 3   [] 4   3. Moving from lying on back to sitting on the side of the bed? [] 1   [] 2   [x] 3   [] 4   How much help from another person does the patient currently need. .. Total A Lot A Little None   4. Moving to and from a bed to a chair (including a wheelchair)? [] 1   [] 2   [x] 3   [] 4   5. Need to walk in hospital room? [] 1   [] 2   [x] 3   [] 4   6. Climbing 3-5 steps with a railing? [] 1   [] 2   [x] 3   [] 4   © 2007, Trustees of [address] [place] [address] [address] [address], under license to [employer]. All rights reserved        Score:  Initial: 18 Most Recent: X (Date: -- )    Interpretation of Tool:  Represents activities that are increasingly more difficult (i.e. Bed mobility, Transfers, Gait). Score 24 23 22-20 19-15 14-10 9-7 6     Modifier CH CI CJ CK CL CM CN      ? Mobility - Walking and Moving Around:     - CURRENT STATUS: CK - 40%-59% impaired, limited or restricted    - GOAL STATUS: CI - 1%-19% impaired, limited or restricted    - D/C STATUS:  ---------------To be determined---------------  [place] / [place] / Product Type: Medicare /      Medical Necessity:     · Patient is expected to demonstrate progress in strength, range of motion and functional technique to decrease assistance required with functional mobility and ERNIE exercises. Reason for Services/Other Comments:  · Patient continues to require skilled intervention due to inability to complete functional mobility and ERNIE exercises independently.    Use of outcome tool(s) and clinical judgement create a POC that gives a: Clear prediction of patient's progress: LOW COMPLEXITY            TREATMENT:   (In addition to Assessment/Re-Assessment sessions the following treatments were rendered)     Pre-treatment Symptoms/Complaints:  none  Pain Initial:   Pain Intensity 1: 0  Post Session:  0/10     Assessment/Reassessment only, no treatment provided today    Date:   Date:   Date:     ACTIVITY/EXERCISE AM PM AM PM AM PM   GROUP THERAPY  []  []  []  []  []  []   Ankle Pumps         Quad Sets         Gluteal Sets         Hip ABd/ADduction         Straight Leg Raises         Knee Slides         Short Arc Quads         Long Arc Quads         Chair Slides                  B = bilateral; AA = active assistive; A = active; P = passive      Treatment/Session Assessment:     Response to Treatment:  Tolerated well. Education:  [] Home Exercises  [x] Fall Precautions  [] Hip Precautions [] D/C Instruction Review  [] Knee/Hip Prosthesis Review  [] Walker Management/Safety [] Adaptive Equipment as Needed       Interdisciplinary Collaboration:   o Occupational Therapist  o Registered Nurse    After treatment position/precautions:   o Supine in bed  o Bed/Chair-wheels locked  o Bed in low position  o Call light within reach  o Family at bedside    Compliance with Program/Exercises: compliant all of the time. Recommendations/Intent for next treatment session:  Treatment next visit will focus on increasing Ms. Yaire's independence with bed mobility, transfers, gait training, strength/ROM exercises, modalities for pain, and patient education.       Total Treatment Duration:  PT Patient Time In/Time Out  Time In: 1200  Time Out: 1201 [facility],

## 2018-04-05 NOTE — PROGRESS NOTES
[address]  Face to Face Encounter    Patients Name: Autumn Gosselin    YOB: 1950    Ordering Physician: Dr. Ralph Culver    Primary Diagnosis: LT ERNIE    Date of Face to Face:   4/5/2018                                  Face to Face Encounter findings are related to primary reason for home care:   yes. 1. I certify that the patient needs intermittent care as follows: physical therapy: gait/stair training    2. I certify that this patient is homebound, that is: 1) patient requires the use of a walker device, special transportation, or assistance of another to leave the home; or 2) patient's condition makes leaving the home medically contraindicated; and 3) patient has a normal inability to leave the home and leaving the home requires considerable and taxing effort. Patient may leave the home for infrequent and short duration for medical reasons, and occasional absences for non-medical reasons. Homebound status is due to the following functional limitations: Patient's ambulation limited secondary to severe pain and requires the use of an assistive device and the assistance of a caregiver for safe completion. Patient with strength and ROM deficits limiting ambulation endurance requiring the use of an assistive device and the assistance of a caregiver. Patient deemed temporarily homebound secondary to increased risk for infection when leaving home and going out into the community. 3. I certify that this patient is under my care and that I, or a nurse practitioner or  [id number], or clinical nurse specialist, or certified nurse midwife, working with me, had a Face-to-Face Encounter that meets the physician Face-to-Face Encounter requirements.   The following are the clinical findings from the [address] [place] [address] encounter that support the need for skilled services and is a summary of the encounter: see hospital chart        Dominic Wolfe RN  4/5/2018      THE FOLLOWING TO BE COMPLETED BY THE COMMUNITY PHYSICIAN:    I concur with the findings described above from the F2F encounter that this patient is homebound and in need of a skilled service.     Certifying Physician: _____________________________________      Printed Certifying Physician Name: _____________________________________    Date: _________________

## 2018-04-06 VITALS
WEIGHT: 174.6 LBS | TEMPERATURE: 97 F | DIASTOLIC BLOOD PRESSURE: 72 MMHG | BODY MASS INDEX: 28.06 KG/M2 | RESPIRATION RATE: 18 BRPM | OXYGEN SATURATION: 97 % | HEART RATE: 79 BPM | HEIGHT: 66 IN | SYSTOLIC BLOOD PRESSURE: 138 MMHG

## 2018-04-06 PROBLEM — Z96.642 H/O TOTAL HIP ARTHROPLASTY, LEFT: Status: ACTIVE | Noted: 2018-04-06

## 2018-04-06 LAB
ANION GAP SERPL CALC-SCNC: 8 MMOL/L (ref 7–16)
BUN SERPL-MCNC: 11 MG/DL (ref 8–23)
CALCIUM SERPL-MCNC: 8.4 MG/DL (ref 8.3–10.4)
CHLORIDE SERPL-SCNC: 105 MMOL/L (ref 98–107)
CO2 SERPL-SCNC: 27 MMOL/L (ref 21–32)
CREAT SERPL-MCNC: 0.98 MG/DL (ref 0.6–1)
GLUCOSE SERPL-MCNC: 125 MG/DL (ref 65–100)
HGB BLD-MCNC: 11 G/DL (ref 11.7–15.4)
MM INDURATION POC: 0 MM (ref 0–5)
POTASSIUM SERPL-SCNC: 4.1 MMOL/L (ref 3.5–5.1)
PPD POC: NORMAL NEGATIVE
SODIUM SERPL-SCNC: 140 MMOL/L (ref 136–145)

## 2018-04-06 PROCEDURE — 74011250637 HC RX REV CODE- 250/637: Performed by: ORTHOPAEDIC SURGERY

## 2018-04-06 PROCEDURE — 80048 BASIC METABOLIC PNL TOTAL CA: CPT | Performed by: ORTHOPAEDIC SURGERY

## 2018-04-06 PROCEDURE — 97116 GAIT TRAINING THERAPY: CPT

## 2018-04-06 PROCEDURE — 97535 SELF CARE MNGMENT TRAINING: CPT

## 2018-04-06 PROCEDURE — 36415 COLL VENOUS BLD VENIPUNCTURE: CPT | Performed by: ORTHOPAEDIC SURGERY

## 2018-04-06 PROCEDURE — 85018 HEMOGLOBIN: CPT | Performed by: ORTHOPAEDIC SURGERY

## 2018-04-06 PROCEDURE — 97110 THERAPEUTIC EXERCISES: CPT

## 2018-04-06 PROCEDURE — 94760 N-INVAS EAR/PLS OXIMETRY 1: CPT

## 2018-04-06 PROCEDURE — 97150 GROUP THERAPEUTIC PROCEDURES: CPT

## 2018-04-06 PROCEDURE — 74011250636 HC RX REV CODE- 250/636: Performed by: ORTHOPAEDIC SURGERY

## 2018-04-06 RX ORDER — OXYCODONE HYDROCHLORIDE 5 MG/1
5-10 TABLET ORAL
Qty: 60 TAB | Refills: 0 | Status: SHIPPED | OUTPATIENT
Start: 2018-04-06 | End: 2018-06-15

## 2018-04-06 RX ORDER — ASPIRIN 81 MG/1
81 TABLET ORAL EVERY 12 HOURS
Qty: 60 TAB | Refills: 0 | Status: SHIPPED | OUTPATIENT
Start: 2018-04-06 | End: 2018-05-06

## 2018-04-06 RX ADMIN — OXYCODONE HYDROCHLORIDE 10 MG: 5 TABLET ORAL at 12:03

## 2018-04-06 RX ADMIN — ACETAMINOPHEN 1000 MG: 500 TABLET, FILM COATED ORAL at 12:03

## 2018-04-06 RX ADMIN — ACETAMINOPHEN 1000 MG: 500 TABLET, FILM COATED ORAL at 00:19

## 2018-04-06 RX ADMIN — ACETAMINOPHEN 1000 MG: 500 TABLET, FILM COATED ORAL at 05:34

## 2018-04-06 RX ADMIN — OXYCODONE HYDROCHLORIDE 10 MG: 5 TABLET ORAL at 00:19

## 2018-04-06 RX ADMIN — Medication 2 G: at 00:20

## 2018-04-06 RX ADMIN — OXYCODONE HYDROCHLORIDE 10 MG: 5 TABLET ORAL at 08:33

## 2018-04-06 RX ADMIN — CELECOXIB 200 MG: 200 CAPSULE ORAL at 08:33

## 2018-04-06 RX ADMIN — ASPIRIN 81 MG: 81 TABLET, COATED ORAL at 08:33

## 2018-04-06 RX ADMIN — PANTOPRAZOLE SODIUM 40 MG: 40 TABLET, DELAYED RELEASE ORAL at 05:33

## 2018-04-06 RX ADMIN — CALCIUM CARBONATE-CHOLECALCIFEROL TAB 250 MG-125 UNIT 1 TABLET: 250-125 TAB at 08:33

## 2018-04-06 RX ADMIN — SENNOSIDES AND DOCUSATE SODIUM 2 TABLET: 8.6; 5 TABLET ORAL at 08:33

## 2018-04-06 RX ADMIN — OXYCODONE HYDROCHLORIDE 10 MG: 5 TABLET ORAL at 05:33

## 2018-04-06 NOTE — ANESTHESIA PROCEDURE NOTES
Spinal Block    Start time: 4/5/2018 7:13 AM  End time: 4/5/2018 7:17 AM  Performed by: Alma Ramírez  Authorized by: Alma Ramírez     Pre-procedure:   Indications: at surgeon's request and primary anesthetic  Preanesthetic Checklist: patient identified, risks and benefits discussed, anesthesia consent, site marked, patient being monitored and timeout performed      Spinal Block:   Patient Position:  Seated  Prep Region:  Lumbar  Prep: chlorhexidine      Location:  L3-4  Technique:  Single shot  Local:  Lidocaine 1%      Needle:   Needle Type:  Pencan  Needle Gauge:  25 G  Attempts:  1      Events: CSF confirmed, no blood with aspiration and no paresthesia        Assessment:  Insertion:  Uncomplicated  Patient tolerance:  Patient tolerated the procedure well with no immediate complications

## 2018-04-06 NOTE — DISCHARGE SUMMARY
[address] [place] [address]  Total Joint Discharge Summary      Patient ID:  Juli Clarke  [id number]  69 y.o.  1950    Admit date: 4/5/2018  Discharge date and time: 4-6-18  Admitting Physician: Ruth Waldrop MD  Surgeon: Same  Admission Diagnoses: Unilateral primary osteoarthritis, left hip [M16.12]  Discharge Diagnoses: Principal Problem:    H/O total hip arthroplasty, left (4/6/2018)    Active Problems:    Osteoarthritis (11/29/2016)                                Perioperative Antibiotics: Ancef 1 to 2 mg was given depending on patient's weight. If allergic to Ancef or due to other indications, patient was given Vancomycin. Hospital Medications given:   [unfilled]  [unfilled]  [unfilled]    Discharge Medications given:  Current Discharge Medication List      START taking these medications    Details   aspirin delayed-release 81 mg tablet Take 1 Tab by mouth every twelve (12) hours every twelve (12) hours for 30 days. Qty: 60 Tab, Refills: 0      oxyCODONE IR (ROXICODONE) 5 mg immediate release tablet Take 1-2 Tabs by mouth every three (3) hours as needed. Max Daily Amount: 80 mg.  Qty: 60 Tab, Refills: 0    Associated Diagnoses: H/O total hip arthroplasty, left         CONTINUE these medications which have NOT CHANGED    Details   fluticasone (FLONASE ALLERGY RELIEF) 50 mcg/actuation nasal spray 2 Sprays by Both Nostrils route daily. azelastine (ASTELIN) 137 mcg (0.1 %) nasal spray 1 [place] by Both Nostrils route two (2) times a day. Use in each nostril as directed  Qty: 1 Bottle, Refills: 3    Associated Diagnoses: Acute recurrent sinusitis, unspecified location      promethazine (PHENERGAN) 25 mg tablet Take 1 Tab by mouth every six (6) hours as needed for Nausea. Qty: 15 Tab, Refills: 0    Associated Diagnoses: Nausea      montelukast (SINGULAIR) 10 mg tablet Take 1 Tab by mouth daily.  Indications: ALLERGIC RHINITIS  Qty: 90 Tab, Refills: 3    Associated Diagnoses: Seasonal allergic rhinitis due to pollen      cetirizine (ZYRTEC) 10 mg tablet Take 10 mg by mouth daily. acetaminophen (TYLENOL EXTRA STRENGTH) 500 mg tablet Take 1,000 mg by mouth every six (6) hours as needed for Pain. psyllium 0.52 gram capsule Take 1 Cap by mouth daily (with lunch). calcium-cholecalciferol, d3, (CALCIUM 600 + D) 600-125 mg-unit tab Take 1 Tab by mouth two (2) times a day. esomeprazole (NEXIUM) 20 mg capsule Take 1 Cap by mouth daily. Qty: 90 Cap, Refills: 3    Associated Diagnoses: Gastroesophageal reflux disease without esophagitis      Biotin 2,500 mcg cap Take 1 Tab by mouth nightly. ciprofloxacin HCl (CIPRO) 500 mg tablet Take 1 Tab by mouth two (2) times a day. Qty: 20 Tab, Refills: 0    Associated Diagnoses: Acute recurrent sinusitis, unspecified location         STOP taking these medications       multivitamin (ONE A DAY) tablet Comments:   Reason for Stopping:         omega-3 fatty acids-vitamin e (FISH OIL) 1,000 mg cap Comments:   Reason for Stopping:                Additional DVT Prophylaxis:  HINA Hose,Plexi-Pulse    Postoperative transfusions:   none  Post Op complications: none    Hemoglobin at discharge:   Lab Results   Component Value Date/Time    HGB 11.0 (L) 04/06/2018 05:58 AM       Wound appears to be healing without any evidence of infection. Physical Therapy started on the day following surgery and progressed to independent ambulation with the aid of a walker. At the time of discharge, able to go up and down stairs and had understanding of precautions needed following surgery.       PT/OT:            Assistive Device: Walker (comment)                Discharged to: home    Discharge instructions:  -Rx pain medication given  - Anticoagulate with: Ecotrin 81 mg PO BID x 4 weeks  -Resume pre hospital diet             -Resume home medications per medical continuation form     -Ambulate with walker, appropriate total joint protocol  -Follow up in office as scheduled       Signed:  Ginny Navarro  4/6/2018  7:22 AM

## 2018-04-06 NOTE — PROGRESS NOTES
Initial visit by  to convey care and concern and encourage patient that  services are available if desired. No needs were voiced during the visit.      Luigi Chang 66  Board Certified

## 2018-04-06 NOTE — PROGRESS NOTES
Problem: Mobility Impaired (Adult and Pediatric)  Goal: *Acute Goals and Plan of Care (Insert Text)  GOALS (1-4 days):  (1.)Ms. Hines will move from supine to sit and sit to supine  in bed with STAND BY ASSIST. 4/6  (2.)Ms. Hines will transfer from bed to chair and chair to bed with STAND BY ASSIST using the least restrictive device. 4/6  (3.)Ms. Hines will ambulate with STAND BY ASSIST for 300 feet with the least restrictive device. (4.)Ms. Hines will ambulate up/down 3 steps with bilateral  railing with CONTACT GUARD ASSIST with no device. Does not per pt  (5.)Ms. Hines will state/observe ERNIE precautions with 0 verbal cues. 4/6  ________________________________________________________________________________________________      PHYSICAL THERAPY [facility] [place]: Daily Note, PM 4/6/2018  INPATIENT: Hospital Day: 2  [place] / Product Type: Medicare /      NAME/AGE/GENDER: Lubna Saldivar is a 69 y.o. female   PRIMARY DIAGNOSIS:  Unilateral primary osteoarthritis, left hip [M16.12]   Procedure(s) and Anesthesia Type:     * LEFT HIP ARTHROPLASTY TOTAL / Lajoyce Chiki / POD 1 - Spinal (Left)  ICD-10: Treatment Diagnosis:    · Pain in left hip (M25.552)  · Stiffness of Left Hip, Not elsewhere classified (M25.652)  · Difficulty in walking, Not elsewhere classified (R26.2)      ASSESSMENT:     Ms. Katalina Banerjee presents supine on contact and agreeable to therapy assessment. Pt s/p left ERNIE on 4/5/18 and presents with decreased strength and range of motion left lower extremity and with decreased independence with functional mobility. At home she was independent with mobility without assistive devices, she is below her baseline and will benefit from skilled PT interventions to maximize independence with functional mobility and ERNIE exercises. Hope to progress in the morning. She plans to go home with home health PT and family support.   4/6 am she was supine upon arrival.  She performs exercises in the bed without any problems. She increase her distance using RW with CGA and no LOB. She will sit up for a while and then come to group. 4/6 pm she walks toward the gym where she performs exercises without any problems. She is ready for D/C. Ms. Hines's functional progress occurred more rapidly than expected as evidenced by goal attainment. She will be discharge to her home environment with a PT HEP, assistive device(s), and Home Health PT services. This section established at most recent assessment   PROBLEM LIST (Impairments causing functional limitations):  1. Decreased Strength  2. Decreased ADL/Functional Activities  3. Decreased Transfer Abilities  4. Decreased Ambulation Ability/Technique  5. Increased Pain  6. Decreased Flexibility/Joint Mobility  7. Edema/Girth  8. Decreased Knowledge of Precautions  9. Decreased [place] with Home Exercise Program   INTERVENTIONS PLANNED: (Benefits and precautions of physical therapy have been discussed with the patient.)  1. Bed Mobility  2. Cold  3. Gait Training  4. Home Exercise Program (HEP)  5. Therapeutic Exercise/Strengthening  6. Transfer Training  7. Range of Motion: active/assisted/passive  8. Therapeutic Activities  9. Group Therapy     TREATMENT PLAN: Frequency/Duration: Follow patient BID for duration of hospital stay to address above goals. Rehabilitation Potential For Stated Goals: Good     RECOMMENDED REHABILITATION/EQUIPMENT: (at time of discharge pending progress): Continue Skilled Therapy and Home Health: Physical Therapy. HISTORY:   History of Present Injury/Illness (Reason for Referral):  Pt s/p left ERNIE on 4//  Past Medical History/Comorbidities:   Ms. Sheree Tanner  has a past medical history of Anxiety state, unspecified (06/24/2011); Chronic rhinitis (10/01/12); Disorder of bone and cartilage, unspecified (06/24/13);  Dysfunction of eustachian tube (06/09/10); GERD (gastroesophageal reflux disease); HLD (hyperlipidemia); MR (mitral regurgitation); Other synovitis and tenosynovitis (06/23/10); Peripheral vascular disease, unspecified ([facility] [address] (06/24/2011); Seasonal allergic rhinitis; and Unspecified tinnitus (06/13/2012). She also has no past medical history of Difficult intubation; Malignant hyperthermia due to anesthesia; Nausea & vomiting; Pseudocholinesterase deficiency; or Unspecified adverse effect of anesthesia. Ms. Gabriella Seals  has a past surgical history that includes hx colonoscopy (1/23/2012); hx tonsillectomy (childhood); hx hysterectomy (2003); and hx hip replacement (Right, 11/2016). Social History/Living Environment:   Home Environment: Private residence  # Steps to Enter: 3  Rails to Enter: No  One/Two Story Residence: One story  Living Alone: Yes  Support Systems: Child(karly)  Patient Expects to be Discharged to[de-identified] Private residence  Current DME Used/Available at Home: [facility] [place], straight, Commode, bedside, Crutches, Shower chair, Walker, rolling  Tub or Shower Type: Tub/Shower combination  Prior Level of Function/Work/Activity:  Pt living at home, independent with gait and ADLs without assistive devices   Number of Personal Factors/Comorbidities that affect the Plan of Care: 0: LOW COMPLEXITY   EXAMINATION:   Most Recent Physical Functioning:                            Bed Mobility  Supine to Sit: Stand-by assistance  Sit to Supine:  (left up in chair)    Transfers  Sit to Stand: Stand-by assistance  Stand to Sit: Stand-by assistance    Balance  Sitting: Intact  Standing: Intact; With support              Weight Bearing Status  Left Side Weight Bearing: As tolerated  Distance (ft): 144 Feet (ft)  Ambulation - Level of Assistance: Stand-by assistance  Speed/Arminda: Pace decreased (<100 feet/min)  Step Length: Right shortened  Stance: Left decreased  Gait Abnormalities: Antalgic        Braces/Orthotics: none    Left Hip Cold  Type: Cold/ice pack      Body Structures Involved:  1. Bones  2. Joints  3.  Muscles Body Functions Affected:  1. Movement Related Activities and Participation Affected:  1. Mobility  2. Self Care   Number of elements that affect the Plan of Care: 4+: HIGH COMPLEXITY   CLINICAL PRESENTATION:   Presentation: Stable and uncomplicated: LOW COMPLEXITY   CLINICAL DECISION MAKIN [facility] [address] [address] AM-PAC 6 Clicks   Basic Mobility Inpatient Short Form  How much difficulty does the patient currently have. .. Unable A Lot A Little None   1. Turning over in bed (including adjusting bedclothes, sheets and blankets)? [] 1   [] 2   [x] 3   [] 4   2. Sitting down on and standing up from a chair with arms ( e.g., wheelchair, bedside commode, etc.)   [] 1   [] 2   [x] 3   [] 4   3. Moving from lying on back to sitting on the side of the bed? [] 1   [] 2   [x] 3   [] 4   How much help from another person does the patient currently need. .. Total A Lot A Little None   4. Moving to and from a bed to a chair (including a wheelchair)? [] 1   [] 2   [x] 3   [] 4   5. Need to walk in hospital room? [] 1   [] 2   [x] 3   [] 4   6. Climbing 3-5 steps with a railing? [] 1   [] 2   [x] 3   [] 4   © , Trustees of [address] [facility] [address] [address], under license to [employer]. All rights reserved        Score:  Initial: 18 Most Recent: X (Date: -- )    Interpretation of Tool:  Represents activities that are increasingly more difficult (i.e. Bed mobility, Transfers, Gait). Score 24 23 22-20 19-15 14-10 9-7 6     Modifier CH CI CJ CK CL CM CN      ?  Mobility - Walking and Moving Around:     - CURRENT STATUS: CK - 40%-59% impaired, limited or restricted    - GOAL STATUS: CI - 1%-19% impaired, limited or restricted    - D/C STATUS:  ---------------To be determined---------------  [place] / Product Type: Medicare /      Medical Necessity:     · Patient is expected to demonstrate progress in strength, range of motion and functional technique to decrease assistance required with functional mobility and ERNIE exercises. Reason for Services/Other Comments:  · Patient continues to require skilled intervention due to inability to complete functional mobility and ERNIE exercises independently. Use of outcome tool(s) and clinical judgement create a POC that gives a: Clear prediction of patient's progress: LOW COMPLEXITY            TREATMENT:   (In addition to Assessment/Re-Assessment sessions the following treatments were rendered)     Pre-treatment Symptoms/Complaints:  none  Pain Initial:   Pain Intensity 1: 3 (3/10 after therapy)  Post Session:       Gait Training (15 Minutes):  Gait training to improve and/or restore physical functioning as related to mobility. Ambulated 144 Feet (ft) with Stand-by assistance using a   and minimal   related to their hip position and motion to promote proper body alignment. Therapeutic Exercise: (45 Minutes (group therapy)):  Exercises per grid below to improve strength. Required minimal verbal cues to promote proper body alignment. Progressed range as indicated. Date:  4/6 Date:   Date:     ACTIVITY/EXERCISE AM PM AM PM AM PM   GROUP THERAPY  []  [x]  []  []  []  []   Ankle Pumps 15 15       Quad Sets 15 15       Gluteal Sets 15 15       Hip ABd/ADduction 15 15       Straight Leg Raises         Knee Slides 15 15       Short Arc Quads  15       Long Arc Quads  15       Chair Slides                  B = bilateral; AA = active assistive; A = active; P = passive      Treatment/Session Assessment:     Response to Treatment:  Tolerated group therapy well. Ready for D/C.     Education:  [] Home Exercises  [x] Fall Precautions  [] Hip Precautions [] D/C Instruction Review  [] Knee/Hip Prosthesis Review  [] Walker Management/Safety [] Adaptive Equipment as Needed       Interdisciplinary Collaboration:   o Registered Nurse    After treatment position/precautions:   o Up in chair  o Bed/Chair-wheels locked  o Bed in low position  o Call light within reach  o Family at bedside    Compliance with Program/Exercises: compliant all of the time. Recommendations/Intent for next treatment session:  Treatment next visit will focus on increasing Ms. Repaire's independence with bed mobility, transfers, gait training, strength/ROM exercises, modalities for pain, and patient education.       Total Treatment Duration:  PT Patient Time In/Time Out  Time In: 1300  Time Out: 920 Camryn Hoffman, PTA

## 2018-04-06 NOTE — PROGRESS NOTES
Problem: Self Care Deficits Care Plan (Adult)  Goal: *Acute Goals and Plan of Care (Insert Text)  GOALS:   DISCHARGE GOALS (in preparation for going home/rehab):  3 days  1. Ms. Constantino Williamson will perform one lower body dressing activity with minimal assistance with adaptive equipment to demonstrate improved functional mobility and safety. GOAL MET 4/6/2018    2. Ms. Constantino Williamson will perform one lower body bathing activity with minimal  assistance with adaptive equipment to demonstrate improved functional mobility and safety. GOAL MET 4/6/2018    3. Ms. Constantino Williamson will perform toileting/toilet transfer with contact guard assistance with adaptive equipment to demonstrate improved functional mobility and safety. GOAL MET 4/6/2018    4. Ms. Constantino Williamson will perform shower transfer with contact guard assistance with adaptive equipment to demonstrate improved functional mobility and safety. GOAL MET 4/6/2018    5. Ms. Constantino Williamson will state ERNIE precautions with two verbal cues to demonstrate improved functional mobility and safety. GOAL MET 4/6/2018            JOINT CAMP OCCUPATIONAL THERAPY ERNIE: Daily Note, Discharge and AM 4/6/2018  INPATIENT: Hospital Day: 2  [place]: [place] / Product Type: Medicare /      NAME/AGE/GENDER: Kp Tineo is a 69 y.o. female   PRIMARY DIAGNOSIS:  Unilateral primary osteoarthritis, left hip [M16.12]   Procedure(s) and Anesthesia Type:     * LEFT HIP ARTHROPLASTY TOTAL / Sharla Berliner / POD 1 - Spinal (Left)  ICD-10: Treatment Diagnosis:    · Pain in left hip (M25.552)  · Stiffness of Left Hip, Not elsewhere classified (M25.652)  · Other lack of cordination (R27.8)      ASSESSMENT:   Ms. Constantino Williamson is s/p left ERNIE and presents with decreased weight bearing on left LE and decreased independence with functional mobility and activities of daily living.   Patient completed shower and dressing as charter below in ADL grid and is ambulating with rolling walker and CGA assist.  Patient has met 5/5 goals and plans to return home with good family support. Family able to provide patient with appropriate level of assistance at this time. OT reviewed hip precautions throughout session and issued long handled sponge for home use. Patient stated she had other AE already at home from previous hip surgery. Patient instructed to call for assistance when needing to get up from recliner and all needs in reach. Patient verbalized understanding of call light. This section established at most recent assessment   PROBLEM LIST (Impairments causing functional limitations):  1. Decreased Strength  2. Decreased ADL/Functional Activities  3. Decreased Transfer Abilities  4. Increased Pain  5. Increased Fatigue  6. Decreased Flexibility/Joint Mobility  7. Decreased Knowledge of Precautions   INTERVENTIONS PLANNED: (Benefits and precautions of occupational therapy have been discussed with the patient.)  1. Activities of daily living training  2. Adaptive equipment training  3. Balance training  4. Clothing management  5. Donning&doffing training  6. Theraputic activity     TREATMENT PLAN: Frequency/Duration: Follow patient 1 time to address above goals. Rehabilitation Potential For Stated Goals: Good     RECOMMENDED REHABILITATION/EQUIPMENT: (at time of discharge pending progress): Continue Skilled Therapy and Home Health: Physical Therapy. OCCUPATIONAL PROFILE AND HISTORY:   History of Present Injury/Illness (Reason for Referral): Pt presents this date s/p (left) ERNIE. Past Medical History/Comorbidities:   Ms. Leandro Hassan  has a past medical history of Anxiety state, unspecified (06/24/2011); Chronic rhinitis (10/01/12); Disorder of bone and cartilage, unspecified (06/24/13); Dysfunction of eustachian tube (06/09/10); GERD (gastroesophageal reflux disease); HLD (hyperlipidemia); MR (mitral regurgitation); Other synovitis and tenosynovitis (06/23/10);  [facility], [facility] (06/24/2011); Seasonal allergic rhinitis; and Unspecified tinnitus (06/13/2012). She also has no past medical history of Difficult intubation; Malignant hyperthermia due to anesthesia; Nausea & vomiting; Pseudocholinesterase deficiency; or Unspecified adverse effect of anesthesia. Ms. Jaci Germain  has a past surgical history that includes hx colonoscopy (1/23/2012); hx tonsillectomy (childhood); hx hysterectomy (2003); and hx hip replacement (Right, 11/2016). Social History/Living Environment:   Home Environment: Private residence  # Steps to Enter: 3  Rails to Enter: No  One/Two Story Residence: One story  Living Alone: Yes  Support Systems: Child(karly)  Patient Expects to be Discharged to[de-identified] Private residence  Current DME Used/Available at Home: [employer], straight, Commode, bedside, Crutches, Shower chair, Walker, rolling  Tub or Shower Type: Tub/Shower combination  Prior Level of Function/Work/Activity:  Mod I with ADLs     Number of Personal Factors/Comorbidities that affect the Plan of Care: Brief history (0):  LOW COMPLEXITY   ASSESSMENT OF OCCUPATIONAL PERFORMANCE[de-identified]   Most Recent Physical Functioning:   Balance  Sitting: Intact  Standing: Intact; With support                              Mental Status  Neurologic State: Alert  Orientation Level: Oriented X4  Cognition: Appropriate decision making; Follows commands                Basic ADLs (From Assessment) Complex ADLs (From Assessment)   Basic ADL  Feeding: Setup  Oral Facial Hygiene/Grooming: Supervision  Bathing: Moderate assistance  Type of Bath: Chlorhexidine (CHG), Full  Upper Body Dressing: Supervision  Lower Body Dressing: Moderate assistance  Toileting:  Total assistance (catheter)     Grooming/Bathing/Dressing Activities of Daily Living         Upper Body Bathing  Bathing Assistance: Supervision/set-up  Position Performed: Seated in chair     Lower Body Bathing  Bathing Assistance: Supervision/set-up  Perineal  : Contact guard assistance  Position Performed: Standing;Seated in chair  Adaptive Equipment: Long handled sponge;Grab bar  Lower Body : Contact guard assistance  Position Performed: Seated in chair;Standing  Adaptive Equipment: Grab bar;Long handled sponge Toileting  Toileting Assistance: Supervision/set up   Upper Body Dressing Assistance  Dressing Assistance: Supervision/set-up  Bra: Supervision/set-up  Pullover Shirt: Supervision/set-up Functional Transfers  Bathroom Mobility: Contact guard assistance  Toilet Transfer : Contact guard assistance  Shower Transfer: Contact guard assistance   Lower Body Dressing Assistance  Dressing Assistance: Contact guard assistance  Underpants: Contact guard assistance (for standing while pulling up)  Pants With Elastic Waist: Contact guard assistance  Socks: Minimum assistance (with Left sock) Bed/Mat Mobility  Supine to Sit: Stand-by assistance  Sit to Supine:  (left up in chair)  Sit to Stand: Contact guard assistance         Physical Skills Involved:  1. Balance  2. Strength  3. Activity Tolerance Cognitive Skills Affected (resulting in the inability to perform in a timely and safe manner): 1. none Psychosocial Skills Affected:  1. none   Number of elements that affect the Plan of Care: 1-3:  LOW COMPLEXITY   CLINICAL DECISION MAKIN[address] [place] [address] AM-PAC 6 Clicks   Daily Activity Inpatient Short Form  How much help from another person does the patient currently need. .. Total A Lot A Little None   1. Putting on and taking off regular lower body clothing? [] 1   [x] 2   [] 3   [] 4   2. Bathing (including washing, rinsing, drying)? [] 1   [x] 2   [] 3   [] 4   3. Toileting, which includes using toilet, bedpan or urinal?   [x] 1   [] 2   [] 3   [] 4   4. Putting on and taking off regular upper body clothing? [] 1   [] 2   [x] 3   [] 4   5. Taking care of personal grooming such as brushing teeth? [] 1   [] 2   [x] 3   [] 4   6. Eating meals?    [] 1   [] 2   [] 3   [x] 4   © 2007, Trustees of [address] [place] [address], under license to [employer]. All rights reserved     Score:  Initial: 15 Most Recent: X (Date: -- )    Interpretation of Tool:  Represents activities that are increasingly more difficult (i.e. Bed mobility, Transfers, Gait). Score 24 23 22-20 19-15 14-10 9-7 6     Modifier CH CI CJ CK CL CM CN      ? Self Care:     - CURRENT STATUS: CK - 40%-59% impaired, limited or restricted    - GOAL STATUS: CJ - 20%-39% impaired, limited or restricted    - D/C STATUS:  ---------------To be determined---------------  [place] / Product Type: Medicare /                    TREATMENT:   (In addition to Assessment/Re-Assessment sessions the following treatments were rendered)     Pre-treatment Symptoms/Complaints:    Pain: Initial:   Pain Intensity 1: 0  Post Session:  0/10 icepack     Self Care: (40): Procedure(s) (per grid) utilized to improve and/or restore self-care/home management as related to dressing, bathing, toileting and grooming. Required minimal visual, verbal, tactile and   cueing to facilitate activities of daily living skills and compensatory activities. Treatment/Session Assessment:     Response to Treatment:  Tolerated well, plans to go home tomorrow. Education:  [] Home Exercises  [x] Fall Precautions  [x] Hip Precautions [] Going Home Video  [] Knee/Hip Prosthesis Review  [x] Walker Management/Safety [x] Adaptive Equipment as Needed       Interdisciplinary Collaboration:   o Certified Occupational Therapy Assistant  o Registered Nurse  o Physician    After treatment position/precautions:   o Up in chair  o Bed alarm/tab alert on  o Bed/Chair-wheels locked  o Bed in low position  o Call light within reach  o Side rails x 3     Compliance with Program/Exercises: compliant all of the time. Recommendations/Intent for next treatment session: Pt doing well all goals met and will do well at home with support from 5/5.  Patient will be discharged home with home health PT. No further Occupation Therapy warranted, will discharge Occupational Therapy services.          Total Treatment Duration: [address]

## 2018-04-06 NOTE — PROGRESS NOTES
Problem: Falls - Risk of  Goal: *Absence of Falls  Document Ly Fall Risk and appropriate interventions in the flowsheet.    Outcome: Progressing Towards Goal  Fall Risk Interventions:  Mobility Interventions: Communicate number of staff needed for ambulation/transfer, Patient to call before getting OOB         Medication Interventions: Evaluate medications/consider consulting pharmacy, Patient to call before getting OOB    Elimination Interventions: Patient to call for help with toileting needs

## 2018-04-06 NOTE — ANESTHESIA POSTPROCEDURE EVALUATION
Post-Anesthesia Evaluation and Assessment    Patient: Tree Ribera MRN: [id number]  SSN: [id number]    YOB: 1950  Age: 69 y.o. Sex: female       Cardiovascular Function/Vital Signs  Visit Vitals    /74 (BP 1 Location: Right arm, BP Patient Position: At rest)    Pulse 82    Temp 36.8 °C (98.2 °F)    Resp 16    Ht 5' 5.5\" (1.664 m)    Wt 79.2 kg (174 lb 9.6 oz)    SpO2 94%    Breastfeeding No    BMI 28.61 kg/m2       Patient is status post spinal anesthesia for Procedure(s):  LEFT HIP ARTHROPLASTY TOTAL / Melecio Chica / POD 1. Nausea/Vomiting: None    Postoperative hydration reviewed and adequate. Pain:  Pain Scale 1: Numeric (0 - 10) (04/05/18 1600)  Pain Intensity 1: 6 (04/05/18 1600)   Managed    Neurological Status:   Neuro (WDL): Exceptions to [facility] (04/05/18 1315)  Neuro  Neurologic State: Alert; Appropriate for age (04/05/18 1150)  Orientation Level: Appropriate for age (04/05/18 1150)  Cognition: Appropriate decision making; Appropriate for age attention/concentration; Appropriate safety awareness; Follows commands (04/05/18 1150)  Speech: Appropriate for age;Clear (04/05/18 1037)  LLE Motor Response: Purposeful (04/05/18 1220)  RLE Motor Response: Purposeful (04/05/18 1220)   At baseline    Mental Status and Level of Consciousness: Arousable    Pulmonary Status:   O2 Device: Room air (04/05/18 1313)   Adequate oxygenation and airway patent    Complications related to anesthesia: None    Post-anesthesia assessment completed.  No concerns    Signed By: Kylee Jacobsen MD     April 5, 2018

## 2018-04-06 NOTE — PROGRESS NOTES
Oxycodone 10mg po per pt request for c/o pain. Dsng D/I. NV status WDL. Denies needs at present. SCDs on bilaterally. Call light within reach.

## 2018-04-06 NOTE — PROGRESS NOTES
2018         Post Op day: 1 Day Post-Op     Admit Date: 2018  Admit Diagnosis: Unilateral primary osteoarthritis, left hip [M16.12]        Subjective: Doing well, No complaints, No SOB, No Chest Pain, No Nausea or Vomiting     Objective:   Vital Signs are Stable, No Acute Distress, Alert and Oriented, Dressing is Dry,  Neurovascular exam is normal.     Assessment / Plan :  Patient Active Problem List   Diagnosis Code    Seasonal allergic rhinitis J30.2    HLD (hyperlipidemia) E78.5    GERD (gastroesophageal reflux disease) K21.9    MR (mitral regurgitation) I34.0    Cyst of tendon sheath M67.80    Fatigue R53.83    Snoring R06.83    CKD (chronic kidney disease) stage 3, GFR 30-59 ml/min N18.3    Osteoarthritis M19.90    History of total right hip arthroplasty Z96.641    H/O total hip arthroplasty, left P58.343    Patient Vitals for the past 8 hrs:   BP Temp Pulse Resp SpO2   18 0357 122/74 98.5 °F (36.9 °C) 75 16 99 %    Temp (24hrs), Av.9 °F (36.6 °C), Min:97 °F (36.1 °C), Max:98.5 °F (36.9 °C)    Body mass index is 28.61 kg/(m^2).     Lab Results   Component Value Date/Time    HGB 11.0 (L) 2018 05:58 AM      Pt seen by and discussed with [address] [place] [address] home today with home health        Signed By: YUE Rasmussen

## 2018-04-06 NOTE — PROGRESS NOTES
Oxycodone 10mg po per pt request for c/o pain. Slept at intervals during shift. No further c/o voiced. No change in NV status noted. Call light within reach.

## 2018-04-06 NOTE — PROGRESS NOTES
Oxycodone 10mg po per pt request for c/o pain. No change in NV status noted. Call light within reach.

## 2018-04-06 NOTE — PROGRESS NOTES
Problem: Mobility Impaired (Adult and Pediatric)  Goal: *Acute Goals and Plan of Care (Insert Text)  GOALS (1-4 days):  (1.)Ms. Hines will move from supine to sit and sit to supine  in bed with STAND BY ASSIST. 4/6  (2.)Ms. Hines will transfer from bed to chair and chair to bed with STAND BY ASSIST using the least restrictive device. 4/6  (3.)Ms. Hines will ambulate with STAND BY ASSIST for 300 feet with the least restrictive device. 4/6  (4.)Ms. Hines will ambulate up/down 3 steps with bilateral  railing with CONTACT GUARD ASSIST with no device. no  (5.)Ms. Hines will state/observe ERNIE precautions with 0 verbal cues. 4/6  ________________________________________________________________________________________________      PHYSICAL THERAPY [facility] [place]: Daily Note, AM 4/6/2018  INPATIENT: Hospital Day: 2  [place] / [place] / Product Type: Medicare /      NAME/AGE/GENDER: Tesfaye Domínguez is a 69 y.o. female   PRIMARY DIAGNOSIS:  Unilateral primary osteoarthritis, left hip [M16.12]   Procedure(s) and Anesthesia Type:     * LEFT HIP ARTHROPLASTY TOTAL / Pamela Search / POD 1 - Spinal (Left)  ICD-10: Treatment Diagnosis:    · Pain in left hip (M25.552)  · Stiffness of Left Hip, Not elsewhere classified (M25.652)  · Difficulty in walking, Not elsewhere classified (R26.2)      ASSESSMENT:     Ms. Davis Forde presents supine on contact and agreeable to therapy assessment. Pt s/p left ERNIE on 4/5/18 and presents with decreased strength and range of motion left lower extremity and with decreased independence with functional mobility. At home she was independent with mobility without assistive devices, she is below her baseline and will benefit from skilled PT interventions to maximize independence with functional mobility and ERNIE exercises. Hope to progress in the morning. She plans to go home with home health PT and family support.   4/6 am she was supine upon arrival.  She performs exercises in the bed without any problems. She increase her distance using RW with CGA and no LOB. She will sit up for a while and then come to group. 4/6 pm did well with gait and exercises and she is ready for D/C. Ms. Hines's functional progress occurred more rapidly than expected as evidenced by goal attainment. She will be discharge to her home environment with a PT HEP, assistive device(s), and Home Health PT services. This section established at most recent assessment   PROBLEM LIST (Impairments causing functional limitations):  1. Decreased Strength  2. Decreased ADL/Functional Activities  3. Decreased Transfer Abilities  4. Decreased Ambulation Ability/Technique  5. Increased Pain  6. Decreased Flexibility/Joint Mobility  7. Edema/Girth  8. Decreased Knowledge of Precautions  9. Decreased [place] with Home Exercise Program   INTERVENTIONS PLANNED: (Benefits and precautions of physical therapy have been discussed with the patient.)  1. Bed Mobility  2. Cold  3. Gait Training  4. Home Exercise Program (HEP)  5. Therapeutic Exercise/Strengthening  6. Transfer Training  7. Range of Motion: active/assisted/passive  8. Therapeutic Activities  9. Group Therapy     TREATMENT PLAN: Frequency/Duration: Follow patient BID for duration of hospital stay to address above goals. Rehabilitation Potential For Stated Goals: Good     RECOMMENDED REHABILITATION/EQUIPMENT: (at time of discharge pending progress): Continue Skilled Therapy and Home Health: Physical Therapy. HISTORY:   History of Present Injury/Illness (Reason for Referral):  Pt s/p left ERNIE on 4//  Past Medical History/Comorbidities:   Ms. Joseph Madrid  has a past medical history of Anxiety state, unspecified (06/24/2011); Chronic rhinitis (10/01/12); Disorder of bone and cartilage, unspecified (06/24/13);  Dysfunction of eustachian tube (06/09/10); GERD (gastroesophageal reflux disease); HLD (hyperlipidemia); MR (mitral regurgitation); Other synovitis and tenosynovitis (06/23/10); Peripheral vascular disease, unspecified ([facility] [address] (06/24/2011); Seasonal allergic rhinitis; and Unspecified tinnitus (06/13/2012). She also has no past medical history of Difficult intubation; Malignant hyperthermia due to anesthesia; Nausea & vomiting; Pseudocholinesterase deficiency; or Unspecified adverse effect of anesthesia. Ms. Katalina Banerjee  has a past surgical history that includes hx colonoscopy (1/23/2012); hx tonsillectomy (childhood); hx hysterectomy (2003); and hx hip replacement (Right, 11/2016). Social History/Living Environment:   Home Environment: Private residence  # Steps to Enter: 3  Rails to Enter: No  One/Two Story Residence: One story  Living Alone: Yes  Support Systems: Child(karly)  Patient Expects to be Discharged to[de-identified] Private residence  Current DME Used/Available at Home: [address] [facility], [place], straight, Commode, bedside, Crutches, Shower chair, Walker, rolling  Tub or Shower Type: Tub/Shower combination  Prior Level of Function/Work/Activity:  Pt living at home, independent with gait and ADLs without assistive devices   Number of Personal Factors/Comorbidities that affect the Plan of Care: 0: LOW COMPLEXITY   EXAMINATION:   Most Recent Physical Functioning:                            Bed Mobility  Supine to Sit: Stand-by assistance  Sit to Supine:  (left up in chair)    Transfers  Sit to Stand: Stand-by assistance  Stand to Sit: Stand-by assistance    Balance  Sitting: Intact  Standing: Intact; With support              Weight Bearing Status  Left Side Weight Bearing: As tolerated  Distance (ft): 144 Feet (ft)  Ambulation - Level of Assistance: Stand-by assistance  Speed/[place]: Pace decreased (<100 feet/min)  Step Length: Right shortened  Stance: Left decreased  Gait Abnormalities: Antalgic        Braces/Orthotics: none    Left Hip Cold  Type: Cold/ice pack      Body Structures Involved:  1. Bones  2. Joints  3.  Muscles Body Functions Affected:  1. Movement Related Activities and Participation Affected:  1. Mobility  2. Self Care   Number of elements that affect the Plan of Care: 4+: HIGH COMPLEXITY   CLINICAL PRESENTATION:   Presentation: Stable and uncomplicated: LOW COMPLEXITY   CLINICAL DECISION MAKING:   [facility] AM-PAC 6 Clicks   Basic Mobility Inpatient Short Form  How much difficulty does the patient currently have. .. Unable A Lot A Little None   1. Turning over in bed (including adjusting bedclothes, sheets and blankets)? [] 1   [] 2   [x] 3   [] 4   2. Sitting down on and standing up from a chair with arms ( e.g., wheelchair, bedside commode, etc.)   [] 1   [] 2   [x] 3   [] 4   3. Moving from lying on back to sitting on the side of the bed? [] 1   [] 2   [x] 3   [] 4   How much help from another person does the patient currently need. .. Total A Lot A Little None   4. Moving to and from a bed to a chair (including a wheelchair)? [] 1   [] 2   [x] 3   [] 4   5. Need to walk in hospital room? [] 1   [] 2   [x] 3   [] 4   6. Climbing 3-5 steps with a railing? [] 1   [] 2   [x] 3   [] 4   © 2007, Trustees of [facility], under license to [employer]. All rights reserved        Score:  Initial: 18 Most Recent: X (Date: -- )    Interpretation of Tool:  Represents activities that are increasingly more difficult (i.e. Bed mobility, Transfers, Gait). Score 24 23 22-20 19-15 14-10 9-7 6     Modifier CH CI CJ CK CL CM CN      ?  Mobility - Walking and Moving Around:     - CURRENT STATUS: CK - 40%-59% impaired, limited or restricted    - GOAL STATUS: CI - 1%-19% impaired, limited or restricted    - D/C STATUS:  ---------------To be determined---------------  [place] / Product Type: Medicare /      Medical Necessity:     · Patient is expected to demonstrate progress in strength, range of motion and functional technique to decrease assistance required with functional mobility and ERNIE exercises. Reason for Services/Other Comments:  · Patient continues to require skilled intervention due to inability to complete functional mobility and ERNIE exercises independently. Use of outcome tool(s) and clinical judgement create a POC that gives a: Clear prediction of patient's progress: LOW COMPLEXITY            TREATMENT:   (In addition to Assessment/Re-Assessment sessions the following treatments were rendered)     Pre-treatment Symptoms/Complaints:  none  Pain Initial:   Pain Intensity 1: 3 (3/10 after therapy)  Post Session:       Gait Training (15 Minutes):  Gait training to improve and/or restore physical functioning as related to mobility. Ambulated 144 Feet (ft) with Stand-by assistance using a   and minimal   related to their hip position and motion to promote proper body alignment. Therapeutic Exercise: (45 Minutes (group therapy)):  Exercises per grid below to improve strength. Required minimal verbal cues to promote proper body alignment. Progressed range as indicated. Date:  4/6 Date:   Date:     ACTIVITY/EXERCISE AM PM AM PM AM PM   GROUP THERAPY  []  [x]  []  []  []  []   Ankle Pumps 15 15       Quad Sets 15 15       Gluteal Sets 15 15       Hip ABd/ADduction 15 15       Straight Leg Raises         Knee Slides 15 15       Short Arc Quads  15       Long Arc Quads  15       Chair Slides                  B = bilateral; AA = active assistive; A = active; P = passive      Treatment/Session Assessment:     Response to Treatment:  Tolerated session well.     Education:  [] Home Exercises  [x] Fall Precautions  [] Hip Precautions [] D/C Instruction Review  [] Knee/Hip Prosthesis Review  [] Walker Management/Safety [] Adaptive Equipment as Needed       Interdisciplinary Collaboration:   o Registered Nurse    After treatment position/precautions:   o Up in chair  o Bed/Chair-wheels locked  o Bed in low position  o Call light within reach  o Family at bedside Compliance with Program/Exercises: compliant all of the time.     Recommendations/Intent for next treatment session:  D/C with [facility]      Total Treatment Duration:  PT Patient Time In/Time Out  Time In: 1300  Time Out: 920 Camryn Hoffman, PTA

## 2018-04-07 ENCOUNTER — HOME CARE VISIT (OUTPATIENT)
Dept: SCHEDULING | Facility: HOME HEALTH | Age: 68
End: 2018-04-07
Payer: MEDICARE

## 2018-04-07 PROCEDURE — G0151 HHCP-SERV OF PT,EA 15 MIN: HCPCS

## 2018-04-07 PROCEDURE — 400013 HH SOC

## 2018-04-07 PROCEDURE — 3331090001 HH PPS REVENUE CREDIT

## 2018-04-07 PROCEDURE — 3331090002 HH PPS REVENUE DEBIT

## 2018-04-08 VITALS
TEMPERATURE: 98 F | DIASTOLIC BLOOD PRESSURE: 64 MMHG | RESPIRATION RATE: 16 BRPM | SYSTOLIC BLOOD PRESSURE: 120 MMHG | HEART RATE: 74 BPM

## 2018-04-08 PROCEDURE — 3331090002 HH PPS REVENUE DEBIT

## 2018-04-08 PROCEDURE — 3331090001 HH PPS REVENUE CREDIT

## 2018-04-09 ENCOUNTER — HOME CARE VISIT (OUTPATIENT)
Dept: SCHEDULING | Facility: HOME HEALTH | Age: 68
End: 2018-04-09
Payer: MEDICARE

## 2018-04-09 PROCEDURE — 3331090002 HH PPS REVENUE DEBIT

## 2018-04-09 PROCEDURE — G0157 HHC PT ASSISTANT EA 15: HCPCS

## 2018-04-09 PROCEDURE — 3331090001 HH PPS REVENUE CREDIT

## 2018-04-10 VITALS
RESPIRATION RATE: 19 BRPM | TEMPERATURE: 97.6 F | HEART RATE: 78 BPM | DIASTOLIC BLOOD PRESSURE: 78 MMHG | SYSTOLIC BLOOD PRESSURE: 122 MMHG

## 2018-04-10 PROCEDURE — 3331090001 HH PPS REVENUE CREDIT

## 2018-04-10 PROCEDURE — 3331090002 HH PPS REVENUE DEBIT

## 2018-04-11 ENCOUNTER — HOME CARE VISIT (OUTPATIENT)
Dept: SCHEDULING | Facility: HOME HEALTH | Age: 68
End: 2018-04-11
Payer: MEDICARE

## 2018-04-11 VITALS
DIASTOLIC BLOOD PRESSURE: 84 MMHG | RESPIRATION RATE: 17 BRPM | TEMPERATURE: 98 F | HEART RATE: 84 BPM | SYSTOLIC BLOOD PRESSURE: 128 MMHG

## 2018-04-11 PROCEDURE — A6199 ALGINATE DRSG WOUND FILLER: HCPCS

## 2018-04-11 PROCEDURE — G0157 HHC PT ASSISTANT EA 15: HCPCS

## 2018-04-11 PROCEDURE — 3331090002 HH PPS REVENUE DEBIT

## 2018-04-11 PROCEDURE — 3331090001 HH PPS REVENUE CREDIT

## 2018-04-12 PROCEDURE — 3331090002 HH PPS REVENUE DEBIT

## 2018-04-12 PROCEDURE — 3331090001 HH PPS REVENUE CREDIT

## 2018-04-13 ENCOUNTER — HOME CARE VISIT (OUTPATIENT)
Dept: SCHEDULING | Facility: HOME HEALTH | Age: 68
End: 2018-04-13
Payer: MEDICARE

## 2018-04-13 VITALS
RESPIRATION RATE: 17 BRPM | DIASTOLIC BLOOD PRESSURE: 80 MMHG | SYSTOLIC BLOOD PRESSURE: 120 MMHG | TEMPERATURE: 99 F | HEART RATE: 80 BPM

## 2018-04-13 PROCEDURE — 3331090001 HH PPS REVENUE CREDIT

## 2018-04-13 PROCEDURE — G0157 HHC PT ASSISTANT EA 15: HCPCS

## 2018-04-13 PROCEDURE — 3331090002 HH PPS REVENUE DEBIT

## 2018-04-14 PROCEDURE — 3331090001 HH PPS REVENUE CREDIT

## 2018-04-14 PROCEDURE — 3331090002 HH PPS REVENUE DEBIT

## 2018-04-15 PROCEDURE — 3331090002 HH PPS REVENUE DEBIT

## 2018-04-15 PROCEDURE — 3331090001 HH PPS REVENUE CREDIT

## 2018-04-16 ENCOUNTER — HOME CARE VISIT (OUTPATIENT)
Dept: SCHEDULING | Facility: HOME HEALTH | Age: 68
End: 2018-04-16
Payer: MEDICARE

## 2018-04-16 VITALS
TEMPERATURE: 98.4 F | HEART RATE: 76 BPM | SYSTOLIC BLOOD PRESSURE: 132 MMHG | DIASTOLIC BLOOD PRESSURE: 80 MMHG | RESPIRATION RATE: 17 BRPM

## 2018-04-16 PROCEDURE — G0157 HHC PT ASSISTANT EA 15: HCPCS

## 2018-04-16 PROCEDURE — 3331090002 HH PPS REVENUE DEBIT

## 2018-04-16 PROCEDURE — A4649 SURGICAL SUPPLIES: HCPCS

## 2018-04-16 PROCEDURE — 3331090001 HH PPS REVENUE CREDIT

## 2018-04-16 PROCEDURE — A4247 BETADINE/IODINE SWABS/WIPES: HCPCS

## 2018-04-17 PROCEDURE — 3331090001 HH PPS REVENUE CREDIT

## 2018-04-17 PROCEDURE — 3331090002 HH PPS REVENUE DEBIT

## 2018-04-18 PROCEDURE — 3331090002 HH PPS REVENUE DEBIT

## 2018-04-18 PROCEDURE — 3331090001 HH PPS REVENUE CREDIT

## 2018-04-19 ENCOUNTER — HOME CARE VISIT (OUTPATIENT)
Dept: SCHEDULING | Facility: HOME HEALTH | Age: 68
End: 2018-04-19
Payer: MEDICARE

## 2018-04-19 ENCOUNTER — HOME CARE VISIT (OUTPATIENT)
Dept: HOME HEALTH SERVICES | Facility: HOME HEALTH | Age: 68
End: 2018-04-19
Payer: MEDICARE

## 2018-04-19 VITALS
HEART RATE: 80 BPM | SYSTOLIC BLOOD PRESSURE: 118 MMHG | RESPIRATION RATE: 17 BRPM | TEMPERATURE: 99.1 F | DIASTOLIC BLOOD PRESSURE: 78 MMHG

## 2018-04-19 PROCEDURE — G0157 HHC PT ASSISTANT EA 15: HCPCS

## 2018-04-19 PROCEDURE — 3331090001 HH PPS REVENUE CREDIT

## 2018-04-19 PROCEDURE — 3331090002 HH PPS REVENUE DEBIT

## 2018-04-20 PROCEDURE — 3331090002 HH PPS REVENUE DEBIT

## 2018-04-20 PROCEDURE — 3331090001 HH PPS REVENUE CREDIT

## 2018-04-21 PROCEDURE — 3331090002 HH PPS REVENUE DEBIT

## 2018-04-21 PROCEDURE — 3331090001 HH PPS REVENUE CREDIT

## 2018-04-22 PROCEDURE — 3331090001 HH PPS REVENUE CREDIT

## 2018-04-22 PROCEDURE — 3331090002 HH PPS REVENUE DEBIT

## 2018-04-23 PROCEDURE — 3331090001 HH PPS REVENUE CREDIT

## 2018-04-23 PROCEDURE — 3331090002 HH PPS REVENUE DEBIT

## 2018-04-24 ENCOUNTER — HOME CARE VISIT (OUTPATIENT)
Dept: SCHEDULING | Facility: HOME HEALTH | Age: 68
End: 2018-04-24
Payer: MEDICARE

## 2018-04-24 VITALS
SYSTOLIC BLOOD PRESSURE: 128 MMHG | TEMPERATURE: 98.2 F | HEART RATE: 84 BPM | DIASTOLIC BLOOD PRESSURE: 78 MMHG | RESPIRATION RATE: 17 BRPM

## 2018-04-24 PROCEDURE — 3331090002 HH PPS REVENUE DEBIT

## 2018-04-24 PROCEDURE — 3331090001 HH PPS REVENUE CREDIT

## 2018-04-24 PROCEDURE — G0157 HHC PT ASSISTANT EA 15: HCPCS

## 2018-04-25 PROCEDURE — 3331090001 HH PPS REVENUE CREDIT

## 2018-04-25 PROCEDURE — 3331090002 HH PPS REVENUE DEBIT

## 2018-04-26 ENCOUNTER — HOME CARE VISIT (OUTPATIENT)
Dept: SCHEDULING | Facility: HOME HEALTH | Age: 68
End: 2018-04-26
Payer: MEDICARE

## 2018-04-26 VITALS
HEART RATE: 78 BPM | DIASTOLIC BLOOD PRESSURE: 82 MMHG | TEMPERATURE: 98.7 F | SYSTOLIC BLOOD PRESSURE: 132 MMHG | RESPIRATION RATE: 16 BRPM

## 2018-04-26 PROCEDURE — 3331090002 HH PPS REVENUE DEBIT

## 2018-04-26 PROCEDURE — G0151 HHCP-SERV OF PT,EA 15 MIN: HCPCS

## 2018-04-26 PROCEDURE — 3331090001 HH PPS REVENUE CREDIT

## 2018-06-15 ENCOUNTER — HOSPITAL ENCOUNTER (OUTPATIENT)
Dept: LAB | Age: 68
Discharge: HOME | End: 2018-06-15
Payer: MEDICARE

## 2018-06-15 DIAGNOSIS — G47.61 PLMD (PERIODIC LIMB MOVEMENT DISORDER): ICD-10-CM

## 2018-06-15 DIAGNOSIS — D64.9 ANEMIA, UNSPECIFIED TYPE: ICD-10-CM

## 2018-06-15 PROBLEM — R09.02 HYPOXIA: Status: ACTIVE | Noted: 2018-06-15

## 2018-06-15 PROBLEM — G25.81 RLS (RESTLESS LEGS SYNDROME): Status: RESOLVED | Noted: 2018-06-15 | Resolved: 2018-06-15

## 2018-06-15 PROBLEM — G47.33 OSA (OBSTRUCTIVE SLEEP APNEA): Status: ACTIVE | Noted: 2018-06-15

## 2018-06-15 PROBLEM — G25.81 RLS (RESTLESS LEGS SYNDROME): Status: ACTIVE | Noted: 2018-06-15

## 2018-06-15 LAB
FERRITIN SERPL-MCNC: 12 NG/ML (ref 8–388)
IRON SERPL-MCNC: 82 UG/DL (ref 35–150)

## 2018-06-15 PROCEDURE — 83540 ASSAY OF IRON: CPT | Performed by: NURSE PRACTITIONER

## 2018-06-15 PROCEDURE — 36415 COLL VENOUS BLD VENIPUNCTURE: CPT | Performed by: NURSE PRACTITIONER

## 2018-06-15 PROCEDURE — 82728 ASSAY OF FERRITIN: CPT | Performed by: NURSE PRACTITIONER

## 2018-06-20 NOTE — PROGRESS NOTES
Please let patient know that ferritin level is low at 12 and we need to give iron supplement. This could be causing restless leg symptoms to be worse. Add ferrous sulfate 325mg- take one tablet twice daily for one month then once daily. Take with vitamin C as this helps absorption. This can cause constipation and can use an over the counter stool softener. Prescription was sent to pharmacy.

## 2018-08-03 ENCOUNTER — HOSPITAL ENCOUNTER (OUTPATIENT)
Dept: MAMMOGRAPHY | Age: 68
Discharge: HOME | End: 2018-08-03
Attending: INTERNAL MEDICINE
Payer: MEDICARE

## 2018-08-03 DIAGNOSIS — Z78.0 POST-MENOPAUSE: ICD-10-CM

## 2018-08-03 DIAGNOSIS — M85.88 OSTEOPENIA OF SPINE: ICD-10-CM

## 2018-08-03 PROCEDURE — 77080 DXA BONE DENSITY AXIAL: CPT

## 2018-08-06 ENCOUNTER — HOSPITAL ENCOUNTER (OUTPATIENT)
Dept: SLEEP MEDICINE | Age: 68
Discharge: HOME | End: 2018-08-06
Payer: MEDICARE

## 2018-08-06 PROCEDURE — 95810 POLYSOM 6/> YRS 4/> PARAM: CPT

## 2018-08-06 NOTE — PROGRESS NOTES
DEXA shows slight decrease in the bone density. Are you taking the calcium supplement with vitamin D? We can use Prolia which is an injection every 6 months to increase the bone density. That would be an alternative to the oral medications which may cause esophageal irritation because of the gastric reflux. If you are agreeable it can be ordered.

## 2018-09-18 ENCOUNTER — HOSPITAL ENCOUNTER (OUTPATIENT)
Dept: CT IMAGING | Age: 68
Discharge: HOME | End: 2018-09-18
Payer: MEDICARE

## 2018-09-18 ENCOUNTER — HOSPITAL ENCOUNTER (OUTPATIENT)
Dept: LAB | Age: 68
Discharge: HOME | End: 2018-09-18
Payer: MEDICARE

## 2018-09-18 DIAGNOSIS — R10.31 BILATERAL LOWER ABDOMINAL PAIN: ICD-10-CM

## 2018-09-18 DIAGNOSIS — R10.9 ABDOMINAL PAIN, UNSPECIFIED ABDOMINAL LOCATION: ICD-10-CM

## 2018-09-18 DIAGNOSIS — R10.32 BILATERAL LOWER ABDOMINAL PAIN: ICD-10-CM

## 2018-09-18 LAB
ANION GAP SERPL CALC-SCNC: 7 MMOL/L
BASOPHILS # BLD: 0 K/UL (ref 0–0.2)
BASOPHILS NFR BLD: 0 % (ref 0–2)
BUN SERPL-MCNC: 13 MG/DL (ref 8–23)
CALCIUM SERPL-MCNC: 8.8 MG/DL (ref 8.3–10.4)
CHLORIDE SERPL-SCNC: 103 MMOL/L (ref 98–107)
CO2 SERPL-SCNC: 27 MMOL/L (ref 21–32)
CREAT SERPL-MCNC: 1 MG/DL (ref 0.6–1)
DIFFERENTIAL METHOD BLD: ABNORMAL
EOSINOPHIL # BLD: 0 K/UL (ref 0–0.8)
EOSINOPHIL NFR BLD: 0 % (ref 0.5–7.8)
ERYTHROCYTE [DISTWIDTH] IN BLOOD BY AUTOMATED COUNT: 13.5 %
GLUCOSE SERPL-MCNC: 115 MG/DL (ref 65–100)
HCT VFR BLD AUTO: 40.3 % (ref 35.8–46.3)
HGB BLD-MCNC: 13.7 G/DL (ref 11.7–15.4)
IMM GRANULOCYTES # BLD: 0 K/UL (ref 0–0.5)
IMM GRANULOCYTES NFR BLD AUTO: 0 % (ref 0–5)
LYMPHOCYTES # BLD: 1.1 K/UL (ref 0.5–4.6)
LYMPHOCYTES NFR BLD: 11 % (ref 13–44)
MCH RBC QN AUTO: 29.6 PG (ref 26.1–32.9)
MCHC RBC AUTO-ENTMCNC: 34 G/DL (ref 31.4–35)
MCV RBC AUTO: 87 FL (ref 79.6–97.8)
MONOCYTES # BLD: 0.8 K/UL (ref 0.1–1.3)
MONOCYTES NFR BLD: 8 % (ref 4–12)
NEUTS SEG # BLD: 8.8 K/UL (ref 1.7–8.2)
NEUTS SEG NFR BLD: 81 % (ref 43–78)
NRBC # BLD: 0 K/UL (ref 0–0.2)
PLATELET # BLD AUTO: 264 K/UL (ref 150–450)
PMV BLD AUTO: 9.7 FL (ref 9.4–12.3)
POTASSIUM SERPL-SCNC: 3.9 MMOL/L (ref 3.5–5.1)
RBC # BLD AUTO: 4.63 M/UL (ref 4.05–5.2)
SODIUM SERPL-SCNC: 137 MMOL/L (ref 136–145)
WBC # BLD AUTO: 10.8 K/UL (ref 4.3–11.1)

## 2018-09-18 PROCEDURE — 36415 COLL VENOUS BLD VENIPUNCTURE: CPT

## 2018-09-18 PROCEDURE — 80048 BASIC METABOLIC PNL TOTAL CA: CPT

## 2018-09-18 PROCEDURE — 74177 CT ABD & PELVIS W/CONTRAST: CPT

## 2018-09-18 PROCEDURE — 74011636320 HC RX REV CODE- 636/320

## 2018-09-18 PROCEDURE — 74011000258 HC RX REV CODE- 258

## 2018-09-18 PROCEDURE — 85025 COMPLETE CBC W/AUTO DIFF WBC: CPT

## 2018-09-18 RX ORDER — SODIUM CHLORIDE 0.9 % (FLUSH) 0.9 %
10 SYRINGE (ML) INJECTION
Status: COMPLETED | OUTPATIENT
Start: 2018-09-18 | End: 2018-09-18

## 2018-09-18 RX ADMIN — Medication 10 ML: at 12:55

## 2018-09-18 RX ADMIN — SODIUM CHLORIDE 100 ML: 900 INJECTION, SOLUTION INTRAVENOUS at 12:55

## 2018-09-18 RX ADMIN — IOPAMIDOL 100 ML: 755 INJECTION, SOLUTION INTRAVENOUS at 12:54

## 2018-09-18 RX ADMIN — DIATRIZOATE MEGLUMINE AND DIATRIZOATE SODIUM 15 ML: 660; 100 LIQUID ORAL; RECTAL at 12:54

## 2018-09-19 ENCOUNTER — HOSPITAL ENCOUNTER (OUTPATIENT)
Dept: INFUSION THERAPY | Age: 68
End: 2018-09-19

## 2018-10-15 ENCOUNTER — HOSPITAL ENCOUNTER (OUTPATIENT)
Dept: INFUSION THERAPY | Age: 68
Discharge: HOME | End: 2018-10-15
Payer: MEDICARE

## 2018-10-15 VITALS
DIASTOLIC BLOOD PRESSURE: 75 MMHG | TEMPERATURE: 98.2 F | RESPIRATION RATE: 16 BRPM | SYSTOLIC BLOOD PRESSURE: 131 MMHG | HEART RATE: 86 BPM | OXYGEN SATURATION: 97 %

## 2018-10-15 LAB
CALCIUM SERPL-MCNC: 9.5 MG/DL (ref 8.3–10.4)
CREAT SERPL-MCNC: 1.04 MG/DL (ref 0.6–1)

## 2018-10-15 PROCEDURE — 36415 COLL VENOUS BLD VENIPUNCTURE: CPT

## 2018-10-15 PROCEDURE — 96372 THER/PROPH/DIAG INJ SC/IM: CPT

## 2018-10-15 PROCEDURE — 74011250636 HC RX REV CODE- 250/636: Performed by: NURSE PRACTITIONER

## 2018-10-15 PROCEDURE — 82310 ASSAY OF CALCIUM: CPT

## 2018-10-15 PROCEDURE — 82565 ASSAY OF CREATININE: CPT

## 2018-10-15 RX ADMIN — DENOSUMAB 60 MG: 60 INJECTION SUBCUTANEOUS at 17:02

## 2018-10-15 NOTE — PROGRESS NOTES
Arrived to the [facility]. prolia completed. Patient tolerated well Any issues or concerns during appointment: no, patient stayed for observation for 15 minutes with no problems Patient aware of next infusion appointment on Discharged ambulatory

## 2018-10-18 ENCOUNTER — HOSPITAL ENCOUNTER (OUTPATIENT)
Dept: MAMMOGRAPHY | Age: 68
Discharge: HOME | End: 2018-10-18
Payer: MEDICARE

## 2018-10-18 DIAGNOSIS — Z12.31 VISIT FOR SCREENING MAMMOGRAM: ICD-10-CM

## 2018-10-18 PROCEDURE — 77063 BREAST TOMOSYNTHESIS BI: CPT

## 2019-04-22 ENCOUNTER — HOSPITAL ENCOUNTER (OUTPATIENT)
Dept: INFUSION THERAPY | Age: 69
Discharge: HOME | End: 2019-04-22
Payer: MEDICARE

## 2019-04-22 VITALS
DIASTOLIC BLOOD PRESSURE: 77 MMHG | OXYGEN SATURATION: 96 % | SYSTOLIC BLOOD PRESSURE: 136 MMHG | HEART RATE: 82 BPM | TEMPERATURE: 98.6 F | RESPIRATION RATE: 18 BRPM

## 2019-04-22 LAB — CALCIUM SERPL-MCNC: 9.3 MG/DL (ref 8.3–10.4)

## 2019-04-22 PROCEDURE — 36415 COLL VENOUS BLD VENIPUNCTURE: CPT

## 2019-04-22 PROCEDURE — 82310 ASSAY OF CALCIUM: CPT

## 2019-04-22 PROCEDURE — 96372 THER/PROPH/DIAG INJ SC/IM: CPT

## 2019-04-22 PROCEDURE — 74011250636 HC RX REV CODE- 250/636: Performed by: NURSE PRACTITIONER

## 2019-04-22 RX ADMIN — DENOSUMAB 60 MG: 60 INJECTION SUBCUTANEOUS at 17:23

## 2019-04-22 NOTE — PROGRESS NOTES
Arrived to the [facility]. Prolia completed. Patient tolerated well. Any issues or concerns during appointment: none. Patient aware of next infusion appointment on 10/22/19 at 1700. Discharged ambulatory.

## 2019-07-30 ENCOUNTER — HOSPITAL ENCOUNTER (OUTPATIENT)
Dept: GENERAL RADIOLOGY | Age: 69
Discharge: HOME | End: 2019-07-30

## 2019-07-30 DIAGNOSIS — K21.9 GASTROESOPHAGEAL REFLUX DISEASE WITHOUT ESOPHAGITIS: ICD-10-CM

## 2019-07-30 DIAGNOSIS — E78.2 MIXED HYPERLIPIDEMIA: ICD-10-CM

## 2019-10-14 PROBLEM — E04.1 THYROID NODULE: Status: ACTIVE | Noted: 2019-10-14

## 2019-10-14 PROBLEM — E05.90 THYROTOXICOSIS WITHOUT CRISIS: Status: ACTIVE | Noted: 2019-10-14

## 2019-10-14 PROBLEM — R00.2 PALPITATIONS: Status: ACTIVE | Noted: 2019-10-14

## 2019-10-22 ENCOUNTER — HOSPITAL ENCOUNTER (OUTPATIENT)
Dept: INFUSION THERAPY | Age: 69
Discharge: HOME | End: 2019-10-22
Payer: MEDICARE

## 2019-10-22 VITALS
TEMPERATURE: 99.1 F | OXYGEN SATURATION: 98 % | DIASTOLIC BLOOD PRESSURE: 70 MMHG | SYSTOLIC BLOOD PRESSURE: 120 MMHG | RESPIRATION RATE: 16 BRPM | HEART RATE: 86 BPM

## 2019-10-22 LAB — CALCIUM SERPL-MCNC: 9.1 MG/DL (ref 8.3–10.4)

## 2019-10-22 PROCEDURE — 96372 THER/PROPH/DIAG INJ SC/IM: CPT

## 2019-10-22 PROCEDURE — 74011250636 HC RX REV CODE- 250/636: Performed by: NURSE PRACTITIONER

## 2019-10-22 PROCEDURE — 82310 ASSAY OF CALCIUM: CPT

## 2019-10-22 PROCEDURE — 36415 COLL VENOUS BLD VENIPUNCTURE: CPT

## 2019-10-22 RX ADMIN — DENOSUMAB 60 MG: 60 INJECTION SUBCUTANEOUS at 16:46

## 2019-10-22 NOTE — PROGRESS NOTES
Pt ambulatory to area without complaints. Received prolia per order, tolerated well. Aware of next appt on [email or web address]. Advised to call dr with any issues/concerns. Discharged home without complaints.

## 2019-12-02 ENCOUNTER — HOSPITAL ENCOUNTER (OUTPATIENT)
Dept: MAMMOGRAPHY | Age: 69
Discharge: HOME | End: 2019-12-02
Payer: MEDICARE

## 2019-12-02 DIAGNOSIS — Z12.31 VISIT FOR SCREENING MAMMOGRAM: ICD-10-CM

## 2019-12-02 PROCEDURE — 77063 BREAST TOMOSYNTHESIS BI: CPT

## 2020-03-16 ENCOUNTER — HOSPITAL ENCOUNTER (OUTPATIENT)
Dept: PHYSICAL THERAPY | Age: 70
Discharge: HOME | End: 2020-03-16
Payer: MEDICARE

## 2020-03-16 PROCEDURE — 97110 THERAPEUTIC EXERCISES: CPT

## 2020-03-16 PROCEDURE — 97140 MANUAL THERAPY 1/> REGIONS: CPT

## 2020-03-16 PROCEDURE — 97161 PT EVAL LOW COMPLEX 20 MIN: CPT

## 2020-03-16 NOTE — THERAPY EVALUATION
Aaron Hines  : 1950  [place]  Secondary: Randall Sotomayor at [place] [id number] [facility], [address] [place] [address],[address], [facility] 91.  [phone]   [phone]        OUTPATIENT PHYSICAL THERAPY:Initial Assessment 3/16/2020   ICD-10: Treatment Diagnosis: Iliotibial band syndrome, left leg (M76.32),Difficulty in walking, not elsewhere classified (R26.2)  Precautions/Allergies:   Sulfa (sulfonamide antibiotics)   TREATMENT PLAN:  Effective Dates: 3/16/2020 TO 5/15/2020 (60 days). Frequency/Duration: 2 times a week for 60 Day(s) MEDICAL/REFERRING DIAGNOSIS:  Presence of left artificial hip joint [Z96.642]  Low back pain [M54.5]   DATE OF ONSET: Chronic   REFERRING PHYSICIAN: Marcela Benitez MD MD Orders: Evaluate and treat   Return MD Appointment: unknown      INITIAL ASSESSMENT:  Ms. Babar Del Valle presents with left hip pain, decreased strength, and difficulty walking due to left iliotibial band syndrome. Patient would benefit from skilled physical therapy to address problems and goals. Thank you for this referral.      PROBLEM LIST (Impacting functional limitations):  1. Decreased Strength  2. Decreased Ambulation Ability/Technique  3. Increased Pain  4. Decreased [place] with Home Exercise Program INTERVENTIONS PLANNED: (Treatment may consist of any combination of the following)  1. Cold  2. Gait Training  3. Heat  4. Home Exercise Program (HEP)  5. Manual Therapy  6. Range of Motion (ROM)  7. Therapeutic Exercise/Strengthening     GOALS: (Goals have been discussed and agreed upon with patient.)  Short-Term Functional Goals: Time Frame: 30 days   1. Patient will be independent with home exercise to decrease pain and improve strength. Discharge Goals: Time Frame: 60 days   1. Patient will score greater than or equal to 65 on Lower Extremity Functional Scale indicating improvement.    2. Patient will increase core strength to greater than or equal to 4+/5 demonstrating improved strength. 3. Patient will report no pain with with getting in/out of her car or with laying on left side demonstrating improvement. OUTCOME MEASURE:   Tool Used: Lower Extremity Functional Scale (LEFS)  Score:  Initial: 59/80 Most Recent: X/80 (Date: -- )   Interpretation of Score: 20 questions each scored on a 5 point scale with 0 representing \"extreme difficulty or unable to perform\" and 4 representing \"no difficulty\". The lower the score, the greater the functional disability. 80/80 represents no disability. Minimal detectable change is 9 points. MEDICAL NECESSITY:   · Patient is expected to demonstrate progress in strength and pain to decrease pain with daily activities. REASON FOR SERVICES/OTHER COMMENTS:  · Patient continues to require skilled intervention due to increased pain interfering with daily activities. Total Duration:  PT Patient Time In/Time Out  Time In: 1100  Time Out: 1145    Rehabilitation Potential For Stated Goals: Excellent  Regarding Kanu Hines's therapy, I certify that the treatment plan above will be carried out by a therapist or under their direction. Thank you for this referral,  Nathaly Mendoza, PT     Referring Physician Signature: Shreya Alejandro MD _______________________________ Date _____________     PAIN/SUBJECTIVE:   Initial: Pain Intensity 1: 3  Pain Location 1: Hip, Leg  Pain Orientation 1: Left, Lateral  Post Session:  1/10   HISTORY:   History of Injury/Illness (Reason for Referral):  Patient complains of an achy pain along lateral aspect of left leg. Patient reports symptoms have gotten worse. Patient rates pain level as 3/10. Patient reports she limps when walking. Aggravating factors are walking after getting out of a car, going up and down stairs, and laying on her left side. Patient is a member of [employer]. Patient enjoys working in her yard.   Patient would like to get her legs and core stronger. Past Medical History/Comorbidities:   Ms. Moises Hampton  has a past medical history of Anxiety disorder, Chronic rhinitis, Dysfunction of eustachian tube, Gastroesophageal reflux disease, Hyperlipidemia, Mild mitral regurgitation, Osteopenia, Seasonal allergic rhinitis, and Tinnitus. She also has no past medical history of Difficult intubation, Malignant hyperthermia due to anesthesia, Nausea & vomiting, Pseudocholinesterase deficiency, or Unspecified adverse effect of anesthesia. Ms. Moises Hampton  has a past surgical history that includes hx colonoscopy (01/23/2012); hx tonsillectomy (childhood); hx hip replacement (Right, 11/2016); hx hip replacement (Left, 04/2018); and hx ihsan and bso (2003). Social History/Living Environment:     Lives alone in a one story home. Patient has three steps with a railing to enter. Prior Level of Function/Work/Activity:  Independent. Retired. Currently not working out consistently. Dominant Side:         RIGHT  Personal Factors:          Sex:  female        Age:  68 y.o. Ambulatory/Rehab Services H2 Model Falls Risk Assessment   Risk Factors:       No Risk Factors Identified Ability to Rise from Chair:       (1)  Pushes up, successful in one attempt   Falls Prevention Plan:       No modifications necessary   Total: (5 or greater = High Risk): 1   ©2010 [facility] of [employer]. All Rights Reserved. [facility] Patent #[id number]. Federal Law prohibits the replication, distribution or use without written permission from [facility] [employer]   Current Medications:       Current Outpatient Medications:     methIMAzole (TAPAZOLE) 5 mg tablet, Take 0.5 Tabs by mouth daily. , Disp: 30 Tab, Rfl: 6    lysine (L-LYSINE) 500 mg tab tablet, Take  by mouth two (2) times a day., Disp: , Rfl:     montelukast (SINGULAIR) 10 mg tablet, Take 1 Tab by mouth daily.  Indications: inflammation of the nose due to an allergy, Disp: 90 Tab, Rfl: 3    fluticasone propionate [facility] ALLERGY RELIEF) 50 mcg/actuation nasal spray, 2 Sprays by Both Nostrils route daily. Indications: inflammation of the nose due to an allergy, Disp: 3 Bottle, Rfl: 3    multivitamin (ONE A DAY) tablet, Take 1 Tab by mouth daily. , Disp: , Rfl:     omega 3-DHA-EPA-fish oil (FISH OIL) 1,000 mg (120 mg-180 mg) capsule, Take 2 Caps by mouth daily. , Disp: , Rfl:     fluticasone (FLONASE) 50 mcg/actuation nasal spray, 2 Sprays by Both Nostrils route once for 1 dose. Indications: ALLERGIC RHINITIS, Disp: 3 Bottle, Rfl: 3    cetirizine (ZYRTEC) 10 mg tablet, Take 10 mg by mouth daily. , Disp: , Rfl:     acetaminophen (TYLENOL EXTRA STRENGTH) 500 mg tablet, Take 1,000 mg by mouth every six (6) hours as needed for Pain., Disp: , Rfl:     psyllium 0.52 gram capsule, Take 1 Cap by mouth daily (with lunch). , Disp: , Rfl:     calcium-cholecalciferol, d3, (CALCIUM 600 + D) 600-125 mg-unit tab, Take 1 Tab by mouth two (2) times a day., Disp: , Rfl:     esomeprazole (NEXIUM) 20 mg capsule, Take 1 Cap by mouth daily. , Disp: 90 Cap, Rfl: 3   Date Last Reviewed:  3/16/2020   Number of Personal Factors/Comorbidities that affect the Plan of Care: 0: LOW COMPLEXITY   EXAMINATION:   Observation/Orthostatic Postural Assessment: Forward head/rounded shoulders posture. Palpation:          Increased tenderness noted along left iliotibial band. ROM:          Back range of motions is as follows:  Flexion within normal limits, extension within normal limits, bilateral rotation within normal limits, and bilateral lateral flexion within normal limits. Strength:          Hip flexion right 5/5 and left 4+/5, hip abduction right 5/5 and left 4+/5, hip adduction 5/5, quadriceps 5/5, hamstrings right 5/5 and left 4/5, ankle dorsiflexion 5/5, ankle plantarflexion 5/5, and core 4-/5. Special Tests:          Straight leg raise negative bilateral.    Neurological Screen:        Sensation: within normal limits.    Functional Mobility:         Gait/Ambulation:  Ambulates with antalgic gait pattern. Body Structures Involved:  1. Muscles Body Functions Affected:  1. Neuromusculoskeletal Activities and Participation Affected:  1. General Tasks and Demands  2. Mobility  3.  Community, Social and [place]   Number of elements (examined above) that affect the Plan of Care: 4+: HIGH COMPLEXITY   CLINICAL PRESENTATION:   Presentation: Stable and uncomplicated: LOW COMPLEXITY   CLINICAL DECISION MAKING:   Use of outcome tool(s) and clinical judgement create a POC that gives a: Clear prediction of patient's progress: LOW COMPLEXITY

## 2020-03-16 NOTE — PROGRESS NOTES
Kanu Hines  : 1950  [place]  Secondary: Randall Sotomayor at [place] [id number] [facility], [address] [place] [address],[address], [place] [address].  [phone]   [phone]     OUTPATIENT PHYSICAL THERAPY: Daily Treatment Note 3/16/2020  Visit Count:  1    ICD-10: Treatment Diagnosis: Iliotibial band syndrome, left leg (M76.32),Difficulty in walking, not elsewhere classified (R26.2)  Precautions/Allergies:   Sulfa (sulfonamide antibiotics)   TREATMENT PLAN:  Effective Dates: 3/16/2020 TO 5/15/2020 (60 days). Frequency/Duration: 2 times a week for 60 Day(s)    Pre-treatment Symptoms/Complaints:  Increased pain, decreased strength, and difficulty walking  Pain: Initial: Pain Intensity 1: 3  Pain Location 1: Hip, Leg  Pain Orientation 1: Left, Lateral  Post Session:  1/10   Medications Last Reviewed:  3/16/2020  Updated Objective Findings:  See evaluation note from today  TREATMENT:     MANUAL THERAPY: (10 minutes): Soft tissue mobilization was utilized and necessary because of the patient's painful spasm. Patient in right sidelying received trigger point release/roller to iliotibial band to decrease pain. Skin intact after treatment. THERAPEUTIC EXERCISE: (15 minutes):  Exercises per grid below to improve strength. Required minimal verbal cues to promote proper body alignment. Progressed repetitions as indicated. Date:  3/16/2020 Date:   Date:     Activity/Exercise Parameters Parameters Parameters   Pelvic tilts 10 reps     Bridging  10 reps     Supine marching with pelvic tilt 10 reps bilateral     Supine straight leg raise 10 reps bilateral      IT band stretch 2 reps                     MedBridge Portal  Treatment/Session Summary:    · Response to Treatment:  tolerated without issues.   · Communication/Consultation:  None today  · Equipment provided today:  home exercise program  · Recommendations/Intent for next treatment session: Next visit will focus on manual therapy and core/lower extremity strengthening.     Total Treatment Billable Duration:  25 minutes  PT Patient Time In/Time Out  Time In: 1100  Time Out: 1010 [facility],     Future Appointments   Date Time Provider Solomon Phyllis   3/17/2020 11:30 AM Teresa Sigala MD END BS ENDO   3/20/2020 11:00 AM Jey Yang, PT SFEORPT SFE   3/24/2020  8:45 AM Mikayla North, PT SFEORPT SFE   3/26/2020  9:30 AM Mikayla North, PT SFE[facility]PT SFE   4/7/2020  8:45 AM VissaRaheem ashtonda Elgin, PT SFEORPT SFE   4/9/2020  8:45 AM Mikayla North, PT SFEORPT SFE   4/23/2020  4:30 PM LAB CK GCCOPIG GVL GCC   4/23/2020  5:00 PM POD1B GCCOPIG [facility]   8/4/2020  8:00 AM IMD NURSE ONLY SSA IMD IMD   8/11/2020  9:00 AM Cate Diaz NP SSA IMD IMD

## 2020-03-24 ENCOUNTER — APPOINTMENT (OUTPATIENT)
Dept: PHYSICAL THERAPY | Age: 70
End: 2020-03-24
Payer: MEDICARE

## 2020-03-26 ENCOUNTER — APPOINTMENT (OUTPATIENT)
Dept: PHYSICAL THERAPY | Age: 70
End: 2020-03-26
Payer: MEDICARE

## 2020-04-07 ENCOUNTER — APPOINTMENT (OUTPATIENT)
Dept: PHYSICAL THERAPY | Age: 70
End: 2020-04-07
Payer: MEDICARE

## 2020-04-09 ENCOUNTER — APPOINTMENT (OUTPATIENT)
Dept: PHYSICAL THERAPY | Age: 70
End: 2020-04-09
Payer: MEDICARE

## 2020-04-23 ENCOUNTER — APPOINTMENT (OUTPATIENT)
Dept: INFUSION THERAPY | Age: 70
End: 2020-04-23

## 2020-05-11 ENCOUNTER — HOSPITAL ENCOUNTER (OUTPATIENT)
Dept: PHYSICAL THERAPY | Age: 70
Discharge: HOME | End: 2020-05-11
Payer: MEDICARE

## 2020-05-11 PROCEDURE — 97140 MANUAL THERAPY 1/> REGIONS: CPT

## 2020-05-11 PROCEDURE — 97110 THERAPEUTIC EXERCISES: CPT

## 2020-05-11 NOTE — THERAPY RECERTIFICATION
Kenzie Hines  : 1950  [place]  Secondary: Randall Sotomayor at [address], [address] [place] [address],[address], [place] [address].  [phone]   [phone]        OUTPATIENT PHYSICAL [address] [address]   ICD-10: Treatment Diagnosis: Iliotibial band syndrome, left leg (M76.32),Difficulty in walking, not elsewhere classified (R26.2)  Precautions/Allergies:   Sulfa (sulfonamide antibiotics)   TREATMENT PLAN:  Effective Dates: 2020 TO 7/10/2020 (60 days). Frequency/Duration: 2 times a week for 60 Days   MEDICAL/REFERRING DIAGNOSIS:  Presence of left artificial hip joint [Z96.642]  Low back pain [M54.5]   DATE OF ONSET: Chronic   REFERRING PHYSICIAN: Blanca Brock MD MD Orders: Evaluate and treat   Return MD Appointment: unknown      INITIAL ASSESSMENT:  Ms. Ginger Vazquez presents with left hip pain, decreased strength, and difficulty walking due to left iliotibial band syndrome. Patient would benefit from skilled physical therapy to address problems and goals. Thank you for this referral.     Recertification note:  Patient has attended two scheduled physical therapy appointments from 3/16/2020 to 2020. Patient was limited with therapy due to the clinic shutting down because of COVID-19. Patient would benefit from continuing skilled physical therapy to address problems and goals. Thank you for this referral.    PROBLEM LIST (Impacting functional limitations):  1. Decreased Strength  2. Decreased Ambulation Ability/Technique  3. Increased Pain  4. Decreased [place] with Home Exercise Program INTERVENTIONS PLANNED: (Treatment may consist of any combination of the following)  1. Cold  2. Gait Training  3. Heat  4. Home Exercise Program (HEP)  5. Manual Therapy  6. Range of Motion (ROM)  7.  Therapeutic Exercise/Strengthening     GOALS: (Goals have been discussed and agreed upon with patient.)  Short-Term Functional Goals: Time Frame: 30 days   1. Patient will be independent with home exercise to decrease pain and improve strength. Goal ongoing. Discharge Goals: Time Frame: 60 days   1. Patient will score greater than or equal to 65 on Lower Extremity Functional Scale indicating improvement. Goal ongoing. 2. Patient will increase core strength to greater than or equal to 4+/5 demonstrating improved strength. Goal ongoing. 3. Patient will report no pain with with getting in/out of her car or with laying on left side demonstrating improvement. Goal ongoing. OUTCOME MEASURE:   Tool Used: Lower Extremity Functional Scale (LEFS)  Score:  Initial: 59/80 Most Recent: X/80 (Date: -- )   Interpretation of Score: 20 questions each scored on a 5 point scale with 0 representing \"extreme difficulty or unable to perform\" and 4 representing \"no difficulty\". The lower the score, the greater the functional disability. 80/80 represents no disability. Minimal detectable change is 9 points. MEDICAL NECESSITY:   · Patient is expected to demonstrate progress in strength and pain to decrease pain with daily activities. REASON FOR SERVICES/OTHER COMMENTS:  · Patient continues to require skilled intervention due to increased pain interfering with daily activities. Total Duration:  PT Patient Time In/Time Out  Time In: 0800  Time Out: 0845    Rehabilitation Potential For Stated Goals: Excellent  Regarding Avinash Hines's therapy, I certify that the treatment plan above will be carried out by a therapist or under their direction. Thank you for this referral,  Rob Ahmadi, PT     Referring Physician Signature: Niki Mac MD _______________________________ Date _____________     PAIN/SUBJECTIVE:   Initial: Pain Intensity 1: 0  Post Session:  0/10   HISTORY:   History of Injury/Illness (Reason for Referral):  Patient complains of an achy pain along lateral aspect of left leg.   Patient reports symptoms have gotten worse. Patient rates pain level as 3/10. Patient reports she limps when walking. Aggravating factors are walking after getting out of a car, going up and down stairs, and laying on her left side. Patient is a member of [employer]. Patient enjoys working in her yard. Patient would like to get her legs and core stronger. Past Medical History/Comorbidities:   Ms. Emil Pascal  has a past medical history of Anxiety disorder, Chronic rhinitis, Dysfunction of eustachian tube, Gastroesophageal reflux disease, Hyperlipidemia, Mild mitral regurgitation, Osteopenia, Seasonal allergic rhinitis, and Tinnitus. She also has no past medical history of Difficult intubation, Malignant hyperthermia due to anesthesia, Nausea & vomiting, Pseudocholinesterase deficiency, or Unspecified adverse effect of anesthesia. Ms. Emil Pascal  has a past surgical history that includes hx colonoscopy (01/23/2012); hx tonsillectomy (childhood); hx hip replacement (Right, 11/2016); hx hip replacement (Left, 04/2018); and hx ihsan and bso (2003). Social History/Living Environment:     Lives alone in a one story home. Patient has three steps with a railing to enter. Prior Level of Function/Work/Activity:  Independent. Retired. Currently not working out consistently. Dominant Side:         RIGHT  Personal Factors:          Sex:  female        Age:  68 y.o. Ambulatory/Rehab Services H2 Model Falls Risk Assessment   Risk Factors:       No Risk Factors Identified Ability to Rise from Chair:       (1)  Pushes up, successful in one attempt   Falls Prevention Plan:       No modifications necessary   Total: (5 or greater = High Risk): 1   ©2010 [facility] [employer]. All Rights Reserved. [facility] Patent #[id number].  Federal Law prohibits the replication, distribution or use without written permission from [facility]   Current Medications:       Current Outpatient Medications:     methIMAzole (TAPAZOLE) 5 mg tablet, Take 0.5 Tabs by mouth daily. , Disp: 30 Tab, Rfl: 6    lysine (L-LYSINE) 500 mg tab tablet, Take  by mouth two (2) times a day., Disp: , Rfl:     montelukast (SINGULAIR) 10 mg tablet, Take 1 Tab by mouth daily. Indications: inflammation of the nose due to an allergy, Disp: 90 Tab, Rfl: 3    fluticasone propionate (FLONASE ALLERGY RELIEF) 50 mcg/actuation nasal spray, 2 Sprays by Both Nostrils route daily. Indications: inflammation of the nose due to an allergy, Disp: 3 Bottle, Rfl: 3    multivitamin (ONE A DAY) tablet, Take 1 Tab by mouth daily. , Disp: , Rfl:     omega 3-DHA-EPA-fish oil (FISH OIL) 1,000 mg (120 mg-180 mg) capsule, Take 2 Caps by mouth daily. , Disp: , Rfl:     fluticasone (FLONASE) 50 mcg/actuation nasal spray, 2 Sprays by Both Nostrils route once for 1 dose. Indications: ALLERGIC RHINITIS, Disp: 3 Bottle, Rfl: 3    cetirizine (ZYRTEC) 10 mg tablet, Take 10 mg by mouth daily. , Disp: , Rfl:     acetaminophen (TYLENOL EXTRA STRENGTH) 500 mg tablet, Take 1,000 mg by mouth every six (6) hours as needed for Pain., Disp: , Rfl:     psyllium 0.52 gram capsule, Take 1 Cap by mouth daily (with lunch). , Disp: , Rfl:     calcium-cholecalciferol, d3, (CALCIUM 600 + D) 600-125 mg-unit tab, Take 1 Tab by mouth two (2) times a day., Disp: , Rfl:     esomeprazole (NEXIUM) 20 mg capsule, Take 1 Cap by mouth daily. , Disp: 90 Cap, Rfl: 3   Date Last Reviewed:  5/11/2020   Number of Personal Factors/Comorbidities that affect the Plan of Care: 0: LOW COMPLEXITY   EXAMINATION:   Observation/Orthostatic Postural Assessment: Forward head/rounded shoulders posture. Palpation:          Increased tenderness noted along left iliotibial band. ROM:          Back range of motions is as follows:  Flexion within normal limits, extension within normal limits, bilateral rotation within normal limits, and bilateral lateral flexion within normal limits.    Strength:          Hip flexion right 5/5 and left 4+/5, hip abduction right 5/5 and left 4+/5, hip adduction 5/5, quadriceps 5/5, hamstrings right 5/5 and left 4/5, ankle dorsiflexion 5/5, ankle plantarflexion 5/5, and core 4-/5. Special Tests:          Straight leg raise negative bilateral.    Neurological Screen:        Sensation: within normal limits. Functional Mobility:         Gait/Ambulation:  Ambulates with antalgic gait pattern. Body Structures Involved:  1. Muscles Body Functions Affected:  1. Neuromusculoskeletal Activities and Participation Affected:  1. General Tasks and Demands  2. Mobility  3.  Community, Social and [place]   Number of elements (examined above) that affect the Plan of Care: 4+: HIGH COMPLEXITY   CLINICAL PRESENTATION:   Presentation: Stable and uncomplicated: LOW COMPLEXITY   CLINICAL DECISION MAKING:   Use of outcome tool(s) and clinical judgement create a POC that gives a: Clear prediction of patient's progress: LOW COMPLEXITY

## 2020-05-11 NOTE — PROGRESS NOTES
Melvin Hines  : 1950  [place]  Secondary: Randall Sotomayor at [place] [address] [facility], [address] [place] [address],[address], [facility].  [phone]   [phone]     OUTPATIENT PHYSICAL THERAPY: Daily Treatment Note 2020  Visit Count:  2    ICD-10: Treatment Diagnosis: Iliotibial band syndrome, left leg (M76.32),Difficulty in walking, not elsewhere classified (R26.2)  Precautions/Allergies:   Sulfa (sulfonamide antibiotics)   TREATMENT PLAN:  Effective Dates: 2020 TO 7/10/2020 (60 days). Frequency/Duration: 2 times a week for 60 Days    Pre-treatment Symptoms/Complaints: \"No pain in my left leg now. I have been working in my yard. My left leg still feels weak\". Pain: Initial: Pain Intensity 1: 0  Post Session:  0/10   Medications Last Reviewed:  2020  Updated Objective Findings:  See recertification note  TREATMENT:     MANUAL THERAPY: (10 minutes): Soft tissue mobilization was utilized and necessary because of the patient's painful spasm. Patient in right sidelying received trigger point release to iliotibial band to decrease pain. Skin intact after treatment. THERAPEUTIC EXERCISE: (35 minutes):  Exercises per grid below to improve strength. Required minimal verbal cues to promote proper body alignment. Progressed repetitions as indicated.    Date:  3/16/2020 Date:  2020 Date:     Activity/Exercise Parameters Parameters Parameters   Pelvic tilts 10 reps 15 reps     Bridging  10 reps 15 reps     Supine marching with pelvic tilt 10 reps bilateral 15 reps  bilateral    Supine straight leg raise 10 reps bilateral  2x10 reps bilateral     IT band stretch 2 reps X    Nustep  Level 4 x 5 minutes    Sidelying hip abduction  2x10 reps bilateral    Minisquats  Red ball 2x10 reps      Step ups   6 inches  Forward/lateral 2x10 reps    Lunges  2x10 reps                          MedBridge Portal  Treatment/Session Summary:    · Response to Treatment:  Patient tolerated exercises without complaints. Pateint given additional exercises for home exercise program.  · Communication/Consultation:  None today  · Equipment provided today:  home exercise program  · Recommendations/Intent for next treatment session: Next visit will focus on manual therapy and core/lower extremity strengthening.     Total Treatment Billable Duration:  45 minutes  PT Patient Time In/Time Out  Time In: 0800  Time Out: Andria Lee 32, PT    Future Appointments   Date Time Provider Solomon Ferguson   5/14/2020  8:00 AM Ailyn Galvan, PT [facility] SFE   5/15/2020  9:30 AM LAB CK GCCOPIG GVL GCC   5/15/2020 10:00 AM SS GCCOPIG GVL GCC   5/18/2020  8:00 AM Joslyn North, PT [facility]FEORPT SFE   5/21/2020  8:00 AM Herrera North, PT SFEORPT SFE   5/26/2020  8:00 AM Herrera North, PT SFEORPT SFE   5/28/2020  8:00 AM Herrera North, PT SFEORPT SFE   7/22/2020 12:00 PM Denise Sigala MD END BS ENDO   8/4/2020  8:00 AM IMD NURSE ONLY [facility]OTT IMD IMD   8/11/2020  9:00 AM CHAR Schultz IMD IMD

## 2020-05-14 ENCOUNTER — HOSPITAL ENCOUNTER (OUTPATIENT)
Dept: PHYSICAL THERAPY | Age: 70
Discharge: HOME | End: 2020-05-14
Payer: MEDICARE

## 2020-05-14 PROCEDURE — 97110 THERAPEUTIC EXERCISES: CPT

## 2020-05-14 PROCEDURE — 97140 MANUAL THERAPY 1/> REGIONS: CPT

## 2020-05-14 NOTE — PROGRESS NOTES
Bro Hines  : 1950  [place]  Secondary: Randall Glynn 73 at [facility]  [facility] [place] [address],[address], [address] [facility]  [phone]   [phone]     OUTPATIENT PHYSICAL THERAPY: Daily Treatment Note 2020  Visit Count:  3    ICD-10: Treatment Diagnosis: Iliotibial band syndrome, left leg (M76.32),Difficulty in walking, not elsewhere classified (R26.2)  Precautions/Allergies:   Sulfa (sulfonamide antibiotics)   TREATMENT PLAN:  Effective Dates: 2020 TO 7/10/2020 (60 days). Frequency/Duration: 2 times a week for 60 Days    Pre-treatment Symptoms/Complaints:  \"I am doing well\". Pain: Initial: Pain Intensity 1: 0  Post Session:  0/10   Medications Last Reviewed:  2020  Updated Objective Findings:  None Today  TREATMENT:     MANUAL THERAPY: (10 minutes): Soft tissue mobilization was utilized and necessary because of the patient's painful spasm. Patient in right sidelying received trigger point release to iliotibial band to decrease pain. Skin intact after treatment. THERAPEUTIC EXERCISE: (30 minutes):  Exercises per grid below to improve strength. Required minimal verbal cues to promote proper body alignment. Progressed repetitions as indicated.    Date:  3/16/2020 Date:  2020 Date:  2020   Activity/Exercise Parameters Parameters Parameters   Pelvic tilts 10 reps 15 reps  15 reps   Bridging  10 reps 15 reps  15 reps   Supine marching with pelvic tilt 10 reps bilateral 15 reps  bilateral 15 reps  bilateral   Supine straight leg raise 10 reps bilateral  2x10 reps bilateral  2x10 reps bilateral    IT band stretch 2 reps X X   Nustep  Level 4 x 5 minutes Level 4 x 5 minutes   Sidelying hip abduction  2x10 reps bilateral 2x10 reps bilateral   Minisquats  Red ball 2x10 reps   Red ball 2x10 reps    Step ups   6 inches  Forward/lateral 2x10 reps 6 inches  Forward/lateral 2x10 reps Lunges  2x10 reps 2x10 reps bilateral   Prone hip extension   2x10 reps bilateral    Sit to stand   2x10 reps   Seated on blue ball with alternate leg lifts   X 15 reps bilateral                         MedBridge Portal  Treatment/Session Summary:    · Response to Treatment:  Patient reports increase in tenderness along left IT band. Decreased tenderness after manual therapy. Patient tolerated treatment without issues. · Communication/Consultation:  None today  · Equipment provided today:  home exercise program  · Recommendations/Intent for next treatment session: Next visit will focus on manual therapy and core/lower extremity strengthening.     Total Treatment Billable Duration:  40 minutes  PT Patient Time In/Time Out  Time In: 0800  Time Out: [address], PT    Future Appointments   Date Time Provider Solomon Phyllis   5/15/2020  9:30 AM LAB CK GCCOPIG GVL GCC   5/15/2020 10:00 AM SS GCCOPIG GVL GCC   5/18/2020  8:00 AM Joslyn North, PT SFEORPT SFE   5/21/2020  8:00 AM Herman North, PT SFEORPT SFE   5/26/2020  8:00 AM Herman Northus, PT SFEORPT SFE   5/28/2020  8:00 AM Herman Northus, PT SFEORPT SFE   7/22/2020 12:00 PM Liane Sigala MD END BS ENDO   8/4/2020  8:00 AM IMD NURSE ONLY SCOTT IMD IMD   8/11/2020  9:00 AM Sergio Gitelman, NP SSA IMD IMD

## 2020-05-15 ENCOUNTER — HOSPITAL ENCOUNTER (OUTPATIENT)
Dept: INFUSION THERAPY | Age: 70
Discharge: HOME | End: 2020-05-15
Payer: MEDICARE

## 2020-05-15 ENCOUNTER — HOSPITAL ENCOUNTER (OUTPATIENT)
Dept: LAB | Age: 70
Discharge: HOME | End: 2020-05-15

## 2020-05-15 VITALS
HEART RATE: 78 BPM | DIASTOLIC BLOOD PRESSURE: 75 MMHG | RESPIRATION RATE: 16 BRPM | TEMPERATURE: 98.9 F | SYSTOLIC BLOOD PRESSURE: 139 MMHG | OXYGEN SATURATION: 98 %

## 2020-05-15 LAB — CALCIUM SERPL-MCNC: 9.3 MG/DL (ref 8.3–10.4)

## 2020-05-15 PROCEDURE — 96372 THER/PROPH/DIAG INJ SC/IM: CPT

## 2020-05-15 PROCEDURE — 82310 ASSAY OF CALCIUM: CPT

## 2020-05-15 PROCEDURE — 74011250636 HC RX REV CODE- 250/636: Performed by: NURSE PRACTITIONER

## 2020-05-15 PROCEDURE — 36415 COLL VENOUS BLD VENIPUNCTURE: CPT

## 2020-05-15 RX ADMIN — DENOSUMAB 60 MG: 60 INJECTION SUBCUTANEOUS at 10:25

## 2020-05-15 NOTE — PROGRESS NOTES
Arrived to the [facility]. Prolia injection completed. Provided education on Prolia. Patient has had this medication before. Patient reports taking Calcium and Vitmain D supplements, as ordered. Opportunity for questions provided. Patient instructed to report any side affects to ordering provider. Patient tolerated well. Any issues or concerns during appointment: no.  Patient has no future infusion appointments at this time. Discharged ambulatory with self.

## 2020-05-18 ENCOUNTER — HOSPITAL ENCOUNTER (OUTPATIENT)
Dept: PHYSICAL THERAPY | Age: 70
Discharge: HOME | End: 2020-05-18
Payer: MEDICARE

## 2020-05-18 PROCEDURE — 97140 MANUAL THERAPY 1/> REGIONS: CPT

## 2020-05-18 PROCEDURE — 97110 THERAPEUTIC EXERCISES: CPT

## 2020-05-18 NOTE — PROGRESS NOTES
Violet Plunkett Repaire  : 1950  [place]  Secondary: Randall Glynn 73 at [address] [place] [address] [facility], [address] [place] [address],[address], [address] [facility]  [phone]   [phone]     OUTPATIENT PHYSICAL THERAPY: Daily Treatment Note 2020  Visit Count:  4    ICD-10: Treatment Diagnosis: Iliotibial band syndrome, left leg (M76.32),Difficulty in walking, not elsewhere classified (R26.2)  Precautions/Allergies:   Sulfa (sulfonamide antibiotics)   TREATMENT PLAN:  Effective Dates: 2020 TO 7/10/2020 (60 days). Frequency/Duration: 2 times a week for 60 Days    Pre-treatment Symptoms/Complaints:  \"I walked two miles yesterday. My left hip hurt this morning when I got up. It hurts when I get out of the car\". Pain: Initial: Pain Intensity 1: 3  Pain Location 1: Hip  Pain Orientation 1: Right  Post Session:  0/10   Medications Last Reviewed:  2020  Updated Objective Findings:  None Today  TREATMENT:     MANUAL THERAPY: (10 minutes): Soft tissue mobilization was utilized and necessary because of the patient's painful spasm. Patient in right sidelying received trigger point release to iliotibial band to decrease pain. Skin intact after treatment. THERAPEUTIC EXERCISE: (30 minutes):  Exercises per grid below to improve strength. Required minimal verbal cues to promote proper body alignment. Progressed repetitions as indicated.    Date:  2020 Date:  2020 Date:  2020   Activity/Exercise Parameters Parameters Parameters   Pelvic tilts 15 reps 15 reps  15 reps   Bridging  With alternate leg lifts 15 reps 15 reps  15 reps   Supine marching with pelvic tilt 15 reps  bilateral 1#l 15 reps  bilateral 15 reps  bilateral   Supine straight leg raise 2x10 reps bilateral 1#  2x10 reps bilateral  2x10 reps bilateral    IT band stretch 2 reps X X   Nustep Level 4 x 5 minutes Level 4 x 5 minutes Level 4 x 5 minutes   Sidelying hip abduction 2x10 reps bilateral 1# 2x10 reps bilateral 2x10 reps bilateral   Minisquats Red ball 2x10 reps Red ball 2x10 reps   Red ball 2x10 reps    Step ups  6 inches  Forward/lateral 2x10 reps 6 inches  Forward/lateral 2x10 reps 6 inches  Forward/lateral 2x10 reps   Lunges 2x10 reps bilateral 2x10 reps 2x10 reps bilateral   Prone hip extension 2x10 reps bilateral 1#  2x10 reps bilateral    Sit to stand 2x10 reps  2x10 reps   Seated on blue ball with alternate leg lifts X 15 reps bilateral  X 15 reps bilateral   Trunk rotation Blue theraband 2x10 reps                     MedBridge Portal  Treatment/Session Summary:    · Response to Treatment:  Patient tolerated advancement of exercises without complaints. Patient reports decreased pain after manual therapy. · Communication/Consultation:  None today  · Equipment provided today:  None today  · Recommendations/Intent for next treatment session: Next visit will focus on manual therapy and core/lower extremity strengthening.     Total Treatment Billable Duration:  40 minutes  PT Patient Time In/Time Out  Time In: 0800  Time Out: [address], PT    Future Appointments   Date Time Provider Solomon Ferguson   5/21/2020  8:00 AM Antoinette Mendez PT [facility] SFE   5/26/2020  8:00 AM Yovana North, PT SFEORPT SFE   5/28/2020  8:00 AM Yovana North, PT SFEORPT SFE   7/22/2020 12:00 PM Ricardo Sigala MD END BS ENDO   8/4/2020  8:00 AM IMD NURSE ONLY SCOTT IMD IMD   8/11/2020  9:00 AM CHAR Mancuso IMD IMD

## 2020-05-21 ENCOUNTER — HOSPITAL ENCOUNTER (OUTPATIENT)
Dept: PHYSICAL THERAPY | Age: 70
Discharge: HOME | End: 2020-05-21
Payer: MEDICARE

## 2020-05-21 PROCEDURE — 97110 THERAPEUTIC EXERCISES: CPT

## 2020-05-21 PROCEDURE — 97140 MANUAL THERAPY 1/> REGIONS: CPT

## 2020-05-21 NOTE — PROGRESS NOTES
Mela Hines  : 1950  [place]  Secondary: Randall Sotomayor at [place] [id number] [facility], [address] [place] [address],[address], [place] [address].  [phone]   [phone]     OUTPATIENT PHYSICAL THERAPY: Daily Treatment Note 2020  Visit Count:  5    ICD-10: Treatment Diagnosis: Iliotibial band syndrome, left leg (M76.32),Difficulty in walking, not elsewhere classified (R26.2)  Precautions/Allergies:   Sulfa (sulfonamide antibiotics)   TREATMENT PLAN:  Effective Dates: 2020 TO 7/10/2020 (60 days). Frequency/Duration: 2 times a week for 60 Days    Pre-treatment Symptoms/Complaints: \"No pain, I just feel stiff this morning\". Pain: Initial: Pain Intensity 1: 0(Stiff)  Post Session:  0/10   Medications Last Reviewed:  2020  Updated Objective Findings:  None Today  TREATMENT:     MANUAL THERAPY: (10 minutes): Soft tissue mobilization was utilized and necessary because of the patient's painful spasm. Patient in right sidelying received trigger point release to iliotibial band to decrease pain. Skin intact after treatment. THERAPEUTIC EXERCISE: (30 minutes):  Exercises per grid below to improve strength. Required minimal verbal cues to promote proper body alignment. Progressed repetitions as indicated.    Date:  2020 Date:  2020 Date:  2020   Activity/Exercise Parameters Parameters Parameters   Pelvic tilts 15 reps 15 reps  15 reps   Bridging  With alternate leg lifts 15 reps With alternate leg lifts 15 reps  15 reps   Supine marching with pelvic tilt 15 reps  bilateral 1#l 15 reps  Bilateral 1# 15 reps  bilateral   Supine straight leg raise 2x10 reps bilateral 1#  2x10 reps bilateral 1# 2x10 reps bilateral    IT band stretch 2 reps X X   Nustep Level 4 x 5 minutes Level 4 x 5 minutes Level 4 x 5 minutes   Sidelying hip abduction 2x10 reps bilateral 1# 2x10 reps bilateral 1# 2x10 reps bilateral Minisquats Red ball 2x10 reps Red ball 2x10 reps   Red ball 2x10 reps    Step ups  6 inches  Forward/lateral 2x10 reps 6 inches  Forward/lateral 2x10 reps 6 inches  Forward/lateral 2x10 reps   Lunges 2x10 reps bilateral 2x10 reps 2x10 reps bilateral   Prone hip extension 2x10 reps bilateral 1# 2x10 reps bilateral 1# 2x10 reps bilateral    Sit to stand 2x10 reps 2x10 reps 2x10 reps   Seated on blue ball with alternate leg lifts X 15 reps bilateral X 15 reps bilateral X 15 reps bilateral   Trunk rotation Blue theraband 2x10 reps Blue theraband 2x10 reps    Quadriped with alternate leg lifts  10 reps bilateral              MedBridge Portal  Treatment/Session Summary:    · Response to Treatment:  Patient less stiff after therapy. Patient progressing well with strengthening exercises. · Communication/Consultation:  None today  · Equipment provided today:  None today  · Recommendations/Intent for next treatment session: Next visit will focus on manual therapy and core/lower extremity strengthening.     Total Treatment Billable Duration:  40 minutes  PT Patient Time In/Time Out  Time In: 0800  Time Out: [address], PT    Future Appointments   Date Time Provider Solomon Ferguson   5/26/2020  8:00 AM Ivan Pittman, PT [facility]FE   5/28/2020  8:00 AM Vamshi North, PT [facility]   7/22/2020 12:00 PM Carole Sigala MD END [facility] ENDO   8/4/2020  8:00 AM IMD NURSE ONLY [facility] IMD IMD   8/11/2020  9:00 AM CHAR Santos IMD IMD

## 2020-05-26 ENCOUNTER — HOSPITAL ENCOUNTER (OUTPATIENT)
Dept: PHYSICAL THERAPY | Age: 70
Discharge: HOME | End: 2020-05-26
Payer: MEDICARE

## 2020-05-26 PROCEDURE — 97110 THERAPEUTIC EXERCISES: CPT

## 2020-05-26 PROCEDURE — 97140 MANUAL THERAPY 1/> REGIONS: CPT

## 2020-05-26 NOTE — PROGRESS NOTES
Micheal Mistry Repaire  : 1950  [place]  Secondary: Randall Sotomayor at [address], [address] [place] [address],[address] [facility], [place] [address].  [phone]   [phone]     OUTPATIENT PHYSICAL THERAPY: Daily Treatment Note 2020  Visit Count:  6    ICD-10: Treatment Diagnosis: Iliotibial band syndrome, left leg (M76.32),Difficulty in walking, not elsewhere classified (R26.2)  Precautions/Allergies:   Sulfa (sulfonamide antibiotics)   TREATMENT PLAN:  Effective Dates: 2020 TO 7/10/2020 (60 days). Frequency/Duration: 2 times a week for 60 Days    Pre-treatment Symptoms/Complaints:  Patient reports she is doing well. Pain: Initial: Pain Intensity 1: 0  Post Session:  0/10   Medications Last Reviewed:  2020  Updated Objective Findings:  None Today  TREATMENT:     MANUAL THERAPY: (10 minutes): Soft tissue mobilization was utilized and necessary because of the patient's painful spasm. Patient in right sidelying received trigger point release to iliotibial band to decrease pain. Skin intact after treatment. THERAPEUTIC EXERCISE: (30 minutes):  Exercises per grid below to improve strength. Required minimal verbal cues to promote proper body alignment. Progressed repetitions as indicated.    Date:  2020 Date:  2020 Date:  2020   Activity/Exercise Parameters Parameters Parameters   Pelvic tilts 15 reps 15 reps  15 reps   Bridging  With alternate leg lifts 15 reps With alternate leg lifts 15 reps  With alternate leg lifts 15 reps   Supine marching with pelvic tilt 15 reps  bilateral 1#l 15 reps  Bilateral 1# 15 reps  Bilateral 2#   Supine straight leg raise 2x10 reps bilateral 1#  2x10 reps bilateral 1# 2x10 reps bilateral 2#   IT band stretch 2 reps X X   Nustep Level 4 x 5 minutes Level 4 x 5 minutes Level 4 x 5 minutes   Sidelying hip abduction 2x10 reps bilateral 1# 2x10 reps bilateral 1# 2x10 reps bilateral 2#   Minisquats Red ball 2x10 reps Red ball 2x10 reps   Red ball 2x10 reps    Step ups  6 inches  Forward/lateral 2x10 reps 6 inches  Forward/lateral 2x10 reps 6 inches  Forward/lateral 2x10 reps   Lunges 2x10 reps bilateral 2x10 reps 2x10 reps bilateral   Prone hip extension 2x10 reps bilateral 1# 2x10 reps bilateral 1# 2x10 reps bilateral    Sit to stand 2x10 reps 2x10 reps 2x10 reps   Seated on blue ball with alternate leg lifts X 15 reps bilateral X 15 reps bilateral 2x10 reps bilateral   Trunk rotation Blue theraband 2x10 reps Blue theraband 2x10 reps Blue theraband 2x10 reps   Quadriped with alternate leg lifts  10 reps bilateral 10 reps bilateral             MedBridge Portal  Treatment/Session Summary:    · Response to Treatment:  Patient tolerated exercises without complaints. · Communication/Consultation:  None today  · Equipment provided today:  None today  · Recommendations/Intent for next treatment session: Next visit will focus on manual therapy and core/lower extremity strengthening. Will discharge after next appointment.      Total Treatment Billable Duration:  40 minutes  PT Patient Time In/Time Out  Time In: 0800  Time Out: [address], PT    Future Appointments   Date Time Provider Solomon Ferguson   5/28/2020  8:00 AM Rosalina Cheadle, PT [facility] SFE   7/22/2020 12:00 PM Jania Sigala MD END BS ENDO   8/4/2020  8:00 AM IMD NURSE ONLY [facility] IMD IMD   8/11/2020  9:00 AM Shawn Salcedo NP [facility] IMD IMD

## 2020-05-28 ENCOUNTER — HOSPITAL ENCOUNTER (OUTPATIENT)
Dept: PHYSICAL THERAPY | Age: 70
Discharge: HOME | End: 2020-05-28
Payer: MEDICARE

## 2020-05-28 PROCEDURE — 97110 THERAPEUTIC EXERCISES: CPT

## 2020-05-28 NOTE — THERAPY DISCHARGE
Goldy Hines  : 1950  [place]  Secondary: Randall Sotomayor at [address], [address] [place] [address],[address], [address] [facility]  [phone]   [phone]        OUTPATIENT PHYSICAL THERAPY:Discharge Summary 2020   ICD-10: Treatment Diagnosis: Iliotibial band syndrome, left leg (M76.32),Difficulty in walking, not elsewhere classified (R26.2)  Precautions/Allergies:   Sulfa (sulfonamide antibiotics)   TREATMENT PLAN:  Effective Dates: 2020 TO 7/10/2020 (60 days). Frequency/Duration: 2 times a week for 60 Days. Patient discharged from physical therapy. MEDICAL/REFERRING DIAGNOSIS:  Presence of left artificial hip joint [Z96.642]  Low back pain [M54.5]   DATE OF ONSET: Chronic   REFERRING PHYSICIAN: Kenny Mclaughlin MD MD Orders: Evaluate and treat   Return MD Appointment: unknown      INITIAL ASSESSMENT:  Ms. Jyothi Schmitz presents with left hip pain, decreased strength, and difficulty walking due to left iliotibial band syndrome. Patient would benefit from skilled physical therapy to address problems and goals. Thank you for this referral.     Discharge note:  Patient attended seven scheduled physical therapy appointments from 3/16/2020 to 2020. Patient reports no issues getting in or out of her car. Patient reports being able to lay on her left side without complaints. Patient demonstrates improved core strength. Patient discharged from physical therapy to continue exercises independently. Thank you for this referral.    PROBLEM LIST (Impacting functional limitations):  1. Decreased Strength  2. Decreased Ambulation Ability/Technique  3. Increased Pain  4. Decreased [place] with Home Exercise Program INTERVENTIONS PLANNED: (Treatment may consist of any combination of the following)  1. Cold  2. Gait Training  3. Heat  4. Home Exercise Program (HEP)  5. Manual Therapy  6.  Range of Motion (ROM)  7. Therapeutic Exercise/Strengthening     GOALS: (Goals have been discussed and agreed upon with patient.)  Short-Term Functional Goals: Time Frame: 30 days   1. Patient will be independent with home exercise to decrease pain and improve strength. Goal met. Discharge Goals: Time Frame: 60 days   1. Patient will score greater than or equal to 65 on Lower Extremity Functional Scale indicating improvement. Goal not met. 2. Patient will increase core strength to greater than or equal to 4+/5 demonstrating improved strength. Goal met. 3. Patient will report no pain with with getting in/out of her car or with laying on left side demonstrating improvement. Goal met. OUTCOME MEASURE:   Tool Used: Lower Extremity Functional Scale (LEFS)  Score:  Initial: 59/80 Most Recent: 59/80 (Date: 5-)   Interpretation of Score: 20 questions each scored on a 5 point scale with 0 representing \"extreme difficulty or unable to perform\" and 4 representing \"no difficulty\". The lower the score, the greater the functional disability. 80/80 represents no disability. Minimal detectable change is 9 points. PAIN/SUBJECTIVE:   Initial: Pain Intensity 1: 0  Post Session:  0/10   HISTORY:   History of Injury/Illness (Reason for Referral):  Patient complains of an achy pain along lateral aspect of left leg. Patient reports symptoms have gotten worse. Patient rates pain level as 3/10. Patient reports she limps when walking. Aggravating factors are walking after getting out of a car, going up and down stairs, and laying on her left side. Patient is a member of [employer]. Patient enjoys working in her yard. Patient would like to get her legs and core stronger.     Past Medical History/Comorbidities:   Ms. Andrés Menard  has a past medical history of Anxiety disorder, Chronic rhinitis, Dysfunction of eustachian tube, Gastroesophageal reflux disease, Hyperlipidemia, Mild mitral regurgitation, Osteopenia, Seasonal allergic rhinitis, and Tinnitus. She also has no past medical history of Difficult intubation, Malignant hyperthermia due to anesthesia, Nausea & vomiting, Pseudocholinesterase deficiency, or Unspecified adverse effect of anesthesia. Ms. Rafia Wesley  has a past surgical history that includes hx colonoscopy (01/23/2012); hx tonsillectomy (childhood); hx hip replacement (Right, 11/2016); hx hip replacement (Left, 04/2018); and hx ihsan and bso (2003). Social History/Living Environment:     Lives alone in a one story home. Patient has three steps with a railing to enter. Prior Level of Function/Work/Activity:  Independent. Retired. Currently not working out consistently. Dominant Side:         RIGHT  Personal Factors:          Sex:  female        Age:  68 y.o. Ambulatory/Rehab Services H2 Model Falls Risk Assessment   Risk Factors:       No Risk Factors Identified Ability to Rise from Chair:       (1)  Pushes up, successful in one attempt   Falls Prevention Plan:       No modifications necessary   Total: (5 or greater = High Risk): 1   ©2010 [facility] [employer]. All Rights Reserved. [facility] Patent #7,[id number]. Federal Law prohibits the replication, distribution or use without written permission from [facility] [employer]   Current Medications:       Current Outpatient Medications:     methIMAzole (TAPAZOLE) 5 mg tablet, Take 0.5 Tabs by mouth daily. , Disp: 30 Tab, Rfl: 6    lysine (L-LYSINE) 500 mg tab tablet, Take  by mouth two (2) times a day., Disp: , Rfl:     montelukast (SINGULAIR) 10 mg tablet, Take 1 Tab by mouth daily. Indications: inflammation of the nose due to an allergy, Disp: 90 Tab, Rfl: 3    fluticasone propionate (FLONASE ALLERGY RELIEF) 50 mcg/actuation nasal spray, 2 Sprays by Both Nostrils route daily. Indications: inflammation of the nose due to an allergy, Disp: 3 Bottle, Rfl: 3    multivitamin (ONE A DAY) tablet, Take 1 Tab by mouth daily. , Disp: , Rfl:     omega 3-DHA-EPA-fish oil (FISH OIL) 1,000 mg (120 mg-180 mg) capsule, Take 2 Caps by mouth daily. , Disp: , Rfl:     fluticasone (FLONASE) 50 mcg/actuation nasal spray, 2 Sprays by Both Nostrils route once for 1 dose. Indications: ALLERGIC RHINITIS, Disp: 3 Bottle, Rfl: 3    cetirizine (ZYRTEC) 10 mg tablet, Take 10 mg by mouth daily. , Disp: , Rfl:     acetaminophen (TYLENOL EXTRA STRENGTH) 500 mg tablet, Take 1,000 mg by mouth every six (6) hours as needed for Pain., Disp: , Rfl:     psyllium 0.52 gram capsule, Take 1 Cap by mouth daily (with lunch). , Disp: , Rfl:     calcium-cholecalciferol, d3, (CALCIUM 600 + D) 600-125 mg-unit tab, Take 1 Tab by mouth two (2) times a day., Disp: , Rfl:     esomeprazole (NEXIUM) 20 mg capsule, Take 1 Cap by mouth daily. , Disp: 90 Cap, Rfl: 3   Date Last Reviewed:  5/28/2020   Number of Personal Factors/Comorbidities that affect the Plan of Care: 0: LOW COMPLEXITY   EXAMINATION:   Observation/Orthostatic Postural Assessment: Forward head/rounded shoulders posture. Palpation:          Increased tenderness noted along left iliotibial band. ROM:          Back range of motions is as follows:  Flexion within normal limits, extension within normal limits, bilateral rotation within normal limits, and bilateral lateral flexion within normal limits. Strength:          Hip flexion right 5/5 and left 4+/5, hip abduction right 5/5 and left 4+/5, hip adduction 5/5, quadriceps 5/5, hamstrings right 5/5 and left 4/5, ankle dorsiflexion 5/5, ankle plantarflexion 5/5, and core 4-/5. Retested on 5/28/2020 core strength 5/5 and bilateral lower extremity 5/5. Special Tests:          Straight leg raise negative bilateral.    Neurological Screen:        Sensation: within normal limits. Functional Mobility:         Gait/Ambulation:  Ambulates with antalgic gait pattern. Body Structures Involved:  1. Muscles Body Functions Affected:  1.  Neuromusculoskeletal Activities and Participation Affected:  1. General Tasks and Demands  2. Mobility  3.  Community, Social and [place]   Number of elements (examined above) that affect the Plan of Care: 4+: HIGH COMPLEXITY   CLINICAL PRESENTATION:   Presentation: Stable and uncomplicated: LOW COMPLEXITY   CLINICAL DECISION MAKING:   Use of outcome tool(s) and clinical judgement create a POC that gives a: Clear prediction of patient's progress: LOW COMPLEXITY

## 2020-05-28 NOTE — PROGRESS NOTES
Micheal Mistry Repaire  : 1950  [place]  Secondary: Randall Glynn 73 at [address], [address] [place] [address],[address], [facility].  [phone]   [phone]     OUTPATIENT PHYSICAL THERAPY: Daily Treatment Note 2020  Visit Count:  7    ICD-10: Treatment Diagnosis: Iliotibial band syndrome, left leg (M76.32),Difficulty in walking, not elsewhere classified (R26.2)  Precautions/Allergies:   Sulfa (sulfonamide antibiotics)   TREATMENT PLAN:  Effective Dates: 2020 TO 7/10/2020 (60 days). Frequency/Duration: 2 times a week for 60 Days. Patient discharged from physical therapy to continue exercises independently. Pre-treatment Symptoms/Complaints:  Patient reports feeling stronger. Pain: Initial: Pain Intensity 1: 0  Post Session:  0/10   Medications Last Reviewed:  2020  Updated Objective Findings:  See discharge note  TREATMENT:     THERAPEUTIC EXERCISE: (45 minutes):  Exercises per grid below to improve strength. Required minimal verbal cues to promote proper body alignment. Progressed repetitions as indicated.    Date:  2020 Date:  2020 Date:  2020   Activity/Exercise Parameters Parameters Parameters   Pelvic tilts 15 reps 15 reps  15 reps   Bridging  With alternate leg lifts 15 reps With alternate leg lifts 15 reps  With alternate leg lifts 15 reps   Supine marching with pelvic tilt 15 reps  bilateral 2# 15 reps  Bilateral 1# 15 reps  Bilateral 2#   Supine straight leg raise 2x10 reps bilateral 2#  2x10 reps bilateral 1# 2x10 reps bilateral 2#   IT band stretch X X X   Nustep Level 4 x 5 minutes Level 4 x 5 minutes Level 4 x 5 minutes   Sidelying hip abduction 2x10 reps bilateral 2# 2x10 reps bilateral 1# 2x10 reps bilateral 2#   Minisquats Red ball 2x10 reps Red ball 2x10 reps   Red ball 2x10 reps    Step ups  6 inches  Forward/lateral 2x10 reps 6 inches  Forward/lateral 2x10 reps 6 inches  Forward/lateral 2x10 reps   Lunges 2x10 reps bilateral 2x10 reps 2x10 reps bilateral   Prone hip extension 2x10 reps bilateral 2# 2x10 reps bilateral 1# 2x10 reps bilateral    Sit to stand 2x10 reps 2x10 reps 2x10 reps   Seated on blue ball with alternate leg lifts X 15 reps bilateral X 15 reps bilateral 2x10 reps bilateral   Trunk rotation Black theraband 2x10 reps Blue theraband 2x10 reps Blue theraband 2x10 reps   Quadriped with alternate leg lifts 2x10 reps bilateral 10 reps bilateral 10 reps bilateral   Double knee lifts 15 reps         MedBridge Portal  Treatment/Session Summary:    · Response to Treatment:  Patient demonstrates improved strength and improved mobility since beginning therapy  · Communication/Consultation:  None today  · Equipment provided today:  Home exercise program   · Recommendations/Intent for next treatment session: Patient discharged due to meeting most of her goals.      Total Treatment Billable Duration:  45 minutes  PT Patient Time In/Time Out  Time In: 0800  Time Out: [address], PT    Future Appointments   Date Time Provider Solomon Ferguson   7/22/2020 12:00 PM Janell Sigala MD [facility] ENDO   8/4/2020  8:00 AM IMD NURSE ONLY [facility] IMD   8/11/2020  9:00 AM CHAR Lovett IMD IMD

## 2020-08-04 ENCOUNTER — HOSPITAL ENCOUNTER (OUTPATIENT)
Dept: GENERAL RADIOLOGY | Age: 70
Discharge: HOME | End: 2020-08-04

## 2020-08-04 DIAGNOSIS — K21.9 GASTROESOPHAGEAL REFLUX DISEASE WITHOUT ESOPHAGITIS: ICD-10-CM

## 2020-08-04 DIAGNOSIS — D64.9 ANEMIA, UNSPECIFIED TYPE: ICD-10-CM

## 2020-09-23 ENCOUNTER — HOSPITAL ENCOUNTER (OUTPATIENT)
Dept: MAMMOGRAPHY | Age: 70
Discharge: HOME | End: 2020-09-23
Attending: NURSE PRACTITIONER
Payer: MEDICARE

## 2020-09-23 DIAGNOSIS — M85.89 OSTEOPENIA OF MULTIPLE SITES: ICD-10-CM

## 2020-09-23 PROCEDURE — 77080 DXA BONE DENSITY AXIAL: CPT

## 2020-11-16 ENCOUNTER — HOSPITAL ENCOUNTER (OUTPATIENT)
Dept: LAB | Age: 70
Discharge: HOME | End: 2020-11-16

## 2020-11-16 ENCOUNTER — HOSPITAL ENCOUNTER (OUTPATIENT)
Dept: INFUSION THERAPY | Age: 70
Discharge: HOME | End: 2020-11-16
Payer: MEDICARE

## 2020-11-16 VITALS
DIASTOLIC BLOOD PRESSURE: 81 MMHG | OXYGEN SATURATION: 98 % | WEIGHT: 178.4 LBS | HEART RATE: 83 BPM | TEMPERATURE: 96.7 F | SYSTOLIC BLOOD PRESSURE: 144 MMHG | BODY MASS INDEX: 28.79 KG/M2 | RESPIRATION RATE: 18 BRPM

## 2020-11-16 LAB — CALCIUM SERPL-MCNC: 9.4 MG/DL (ref 8.3–10.4)

## 2020-11-16 PROCEDURE — 74011250636 HC RX REV CODE- 250/636: Performed by: NURSE PRACTITIONER

## 2020-11-16 PROCEDURE — 36415 COLL VENOUS BLD VENIPUNCTURE: CPT

## 2020-11-16 PROCEDURE — 96372 THER/PROPH/DIAG INJ SC/IM: CPT

## 2020-11-16 PROCEDURE — 82310 ASSAY OF CALCIUM: CPT

## 2020-11-16 RX ADMIN — DENOSUMAB 60 MG: 60 INJECTION SUBCUTANEOUS at 09:45

## 2020-11-16 NOTE — PROGRESS NOTES
Arrived to the [facility]. Prolia completed.    Provided education on Prolia-patient has previously received this medication  Patient instructed to report any side affects to ordering provider-Lora Amezcua NP  Patient tolerated well  Any issues or concerns during appointment:No  Patient has no future appointments in OPI @ this time  Discharged home ambulatory

## 2020-12-04 ENCOUNTER — TRANSCRIBE ORDER (OUTPATIENT)
Dept: SCHEDULING | Age: 70
End: 2020-12-04

## 2020-12-04 ENCOUNTER — HOSPITAL ENCOUNTER (OUTPATIENT)
Dept: MAMMOGRAPHY | Age: 70
Discharge: HOME | End: 2020-12-04
Attending: INTERNAL MEDICINE
Payer: MEDICARE

## 2020-12-04 DIAGNOSIS — M25.552 PAIN IN LEFT HIP: Primary | ICD-10-CM

## 2020-12-04 DIAGNOSIS — Z12.31 VISIT FOR SCREENING MAMMOGRAM: ICD-10-CM

## 2020-12-04 DIAGNOSIS — Z96.642 PRESENCE OF LEFT ARTIFICIAL HIP JOINT: ICD-10-CM

## 2020-12-04 PROCEDURE — 77063 BREAST TOMOSYNTHESIS BI: CPT

## 2020-12-14 ENCOUNTER — HOSPITAL ENCOUNTER (OUTPATIENT)
Dept: LAB | Age: 70
Discharge: HOME | End: 2020-12-14
Attending: ORTHOPAEDIC SURGERY
Payer: MEDICARE

## 2020-12-14 ENCOUNTER — HOSPITAL ENCOUNTER (OUTPATIENT)
Dept: NUCLEAR MEDICINE | Age: 70
Discharge: HOME | End: 2020-12-14
Attending: ORTHOPAEDIC SURGERY
Payer: MEDICARE

## 2020-12-14 DIAGNOSIS — Z96.642 PRESENCE OF LEFT ARTIFICIAL HIP JOINT: ICD-10-CM

## 2020-12-14 DIAGNOSIS — M25.552 PAIN IN LEFT HIP: ICD-10-CM

## 2020-12-14 LAB
BASOPHILS # BLD: 0.1 K/UL (ref 0–0.2)
BASOPHILS NFR BLD: 1 % (ref 0–2)
CRP SERPL-MCNC: <0.3 MG/DL (ref 0–0.9)
DIFFERENTIAL METHOD BLD: NORMAL
EOSINOPHIL # BLD: 0.1 K/UL (ref 0–0.8)
EOSINOPHIL NFR BLD: 2 % (ref 0.5–7.8)
ERYTHROCYTE [DISTWIDTH] IN BLOOD BY AUTOMATED COUNT: 13 % (ref 11.9–14.6)
ERYTHROCYTE [SEDIMENTATION RATE] IN BLOOD: 16 MM/HR (ref 0–30)
HCT VFR BLD AUTO: 41.3 % (ref 35.8–46.3)
HGB BLD-MCNC: 13.8 G/DL (ref 11.7–15.4)
IMM GRANULOCYTES # BLD AUTO: 0 K/UL (ref 0–0.5)
IMM GRANULOCYTES NFR BLD AUTO: 0 % (ref 0–5)
LYMPHOCYTES # BLD: 1.6 K/UL (ref 0.5–4.6)
LYMPHOCYTES NFR BLD: 32 % (ref 13–44)
MCH RBC QN AUTO: 30.3 PG (ref 26.1–32.9)
MCHC RBC AUTO-ENTMCNC: 33.4 G/DL (ref 31.4–35)
MCV RBC AUTO: 90.8 FL (ref 79.6–97.8)
MONOCYTES # BLD: 0.4 K/UL (ref 0.1–1.3)
MONOCYTES NFR BLD: 7 % (ref 4–12)
NEUTS SEG # BLD: 2.9 K/UL (ref 1.7–8.2)
NEUTS SEG NFR BLD: 58 % (ref 43–78)
NRBC # BLD: 0 K/UL (ref 0–0.2)
PLATELET # BLD AUTO: 238 K/UL (ref 150–450)
PMV BLD AUTO: 10.8 FL (ref 9.4–12.3)
RBC # BLD AUTO: 4.55 M/UL (ref 4.05–5.2)
WBC # BLD AUTO: 5 K/UL (ref 4.3–11.1)

## 2020-12-14 PROCEDURE — 78306 BONE IMAGING WHOLE BODY: CPT

## 2020-12-14 PROCEDURE — 85025 COMPLETE CBC W/AUTO DIFF WBC: CPT

## 2020-12-14 PROCEDURE — 86140 C-REACTIVE PROTEIN: CPT

## 2020-12-14 PROCEDURE — 85652 RBC SED RATE AUTOMATED: CPT

## 2020-12-14 PROCEDURE — 36415 COLL VENOUS BLD VENIPUNCTURE: CPT

## 2021-01-12 ENCOUNTER — HOSPITAL ENCOUNTER (OUTPATIENT)
Dept: PHYSICAL THERAPY | Age: 71
Discharge: HOME | End: 2021-01-12
Payer: MEDICARE

## 2021-01-12 PROCEDURE — 97140 MANUAL THERAPY 1/> REGIONS: CPT

## 2021-01-12 PROCEDURE — 97110 THERAPEUTIC EXERCISES: CPT

## 2021-01-12 PROCEDURE — 97162 PT EVAL MOD COMPLEX 30 MIN: CPT

## 2021-01-12 NOTE — THERAPY EVALUATION
[address] [facility] : 1950 [place] Secondary: Randall Sotomayor at [address], [address], [address] [facility] [phone]   [phone] OUTPATIENT PHYSICAL THERAPY:Initial Assessment 2021 ICD-10: Treatment Diagnosis: Pain in left hip (M25.552); Low back pain (M54.5) Precautions/Allergies:  
Sulfa (sulfonamide antibiotics) TREATMENT PLAN: 
Effective Dates: 2021 TO 2021 (90 days). Frequency/Duration: 2 times a week for 90 Day(s) MEDICAL/REFERRING DIAGNOSIS: 
hip DATE OF ONSET: Chronic L thigh pain REFERRING PHYSICIAN: Felipe Galindo MD MD Orders: Evaluate and Treat Return MD Appointment: Pt has no follow up appt at this time INITIAL ASSESSMENT:  Ms. Hira Oconnell presents with decreased L hip ROM, decreased L LE strength and increased pain leading to decreased functional status. Pt would benefit from skilled physical therapy services to address the above deficits and help patient return to prior level of function. PROBLEM LIST (Impacting functional limitations): 1. Decreased Strength 2. Decreased ADL/Functional Activities 3. Increased Pain 4. Decreased Flexibility/Joint Mobility 5. Decreased Chambers with Home Exercise Program INTERVENTIONS PLANNED: (Treatment may consist of any combination of the following) 1. Balance Exercise 2. Cold 3. Cryotherapy 4. Electrical Stimulation 5. Gait Training 6. Heat 7. Home Exercise Program (HEP) 8. Manual Therapy 9. Range of Motion (ROM) 10. Therapeutic Exercise/Strengthening GOALS: (Goals have been discussed and agreed upon with patient.) Short-Term Functional Goals: Time Frame: 4 weeks 1. Pt will increase ROM L hip flexion = 105 degrees, extension = 10 degrees, abduction = 45 degrees to assist with household ADL's 2.  Pt will increase strength L hip 4/5 to assist with household ADL's 
 3. Pt will be independent with HEP Discharge Goals: Time Frame: 12 weeks 1. Pt will increase strength L hip 4+/5 to assist with household ADL's 2. Pt will ascend/descend 10 eight-inch steps using a reciprocal pattern independently with no c/o L hip pain 3. Pt will perform 20 minutes household cleaning activities independently with min to no c/o L hip pain OUTCOME MEASURE:  
Tool Used: Lower Extremity Functional Scale (LEFS) Score:  Initial: 49/80 Most Recent: X/80 (Date: -- ) Interpretation of Score: 20 questions each scored on a 5 point scale with 0 representing \"extreme difficulty or unable to perform\" and 4 representing \"no difficulty\". The lower the score, the greater the functional disability. 80/80 represents no disability. Minimal detectable change is 9 points. MEDICAL NECESSITY:  
· Patient is expected to demonstrate progress in strength, range of motion and functional technique to increase independence with self care and household ADL's. · Patient demonstrates good rehab potential due to higher previous functional level. REASON FOR SERVICES/OTHER COMMENTS: 
· Patient continues to require skilled intervention due to decreased ROM/strength L hip with increased pain leading to decreased functional status. Total Duration: PT Patient Time In/Time Out Time In: 0845 Time Out: 0930 Rehabilitation Potential For Stated Goals: Good Regarding Simon Hines's therapy, I certify that the treatment plan above will be carried out by a therapist or under their direction. Thank you for this referral, 
David Sanchez PT Referring Physician Signature: Peter Francis MD _______________________________ Date _____________ PAIN/SUBJECTIVE:  
Initial:   1/10 sitting at rest Post Session:  1/10 HISTORY:  
History of Injury/Illness (Reason for Referral): 
 Pt has history of R ERNIE in 2016 and L ERNIE in 2018. She was attending physical therapy for L thigh/hip pain in spring of 2020 prior to Brody. She states the therapy was helping. Pt states she has started having worsening of her L thigh/hip pain again. Pt currently c/o pain in her L anterior thigh. Pt had x-rays which she states are normal.  Pt states the problem is muscular. She rates her current L LE pain 1/10 sitting at rest, increasing to 8/10 at worst.  Aggravating factors include lifting her L LE into the car, rolling over in the bed, lifting her L LE to put her shoes on, putting her pants on and household cleaning activities. She reports some mild difficulties ascending/descending stairs due to her L thigh pain and not having a railing. Pt denies any LE numbness/tingling or c/o LBP. Past Medical History/Comorbidities: Ms. Hansel Rodriguez  has a past medical history of Anxiety disorder, Chronic rhinitis, Dysfunction of eustachian tube, Gastroesophageal reflux disease, Hyperlipidemia, Mild mitral regurgitation, Osteopenia, Seasonal allergic rhinitis, and Tinnitus. She also has no past medical history of Difficult intubation, Malignant hyperthermia due to anesthesia, Nausea & vomiting, Pseudocholinesterase deficiency, or Unspecified adverse effect of anesthesia. Ms. Hansel Rodriguez  has a past surgical history that includes hx colonoscopy (01/23/2012); hx tonsillectomy (childhood); hx hip replacement (Right, 11/2016); hx hip replacement (Left, 04/2018); and hx ihsan and bso (2003). Social History/Living Environment:  
  Pt is single. She lives in a [place] house with one step to enter from the garage. Prior Level of Function/Work/Activity: 
Pt is retired Dominant Side:  
      RIGHT Other Clinical Tests:   
      X-rays of L hip and thigh negative Personal Factors:   
      Sex:  female Age:  65 y.o. Profession:  Pt is retired Ambulatory/Rehab Services H2 Model Falls Risk Assessment Risk Factors: 
     No Risk Factors Identified Ability to Rise from Chair: 
     (1)  Pushes up, successful in one attempt Falls Prevention Plan: No modifications necessary Total: (5 or greater = High Risk): 1 ©2010 [facility] of [employer] States Patent #[id number]. Federal Law prohibits the replication, distribution or use without written permission from [facility] of [employer] Current Medications:   
  
Current Outpatient Medications:  
  montelukast (SINGULAIR) 10 mg tablet, Take 1 Tab by mouth daily. Indications: inflammation of the nose due to an allergy, Disp: 90 Tab, Rfl: 3 
  fluticasone propionate (Flonase Allergy Relief) 50 mcg/actuation nasal spray, 2 Sprays by Both Nostrils route daily. Indications: inflammation of the nose due to an allergy, Disp: 3 Bottle, Rfl: 3 
  methIMAzole (TAPAZOLE) 5 mg tablet, Take 0.5 Tabs by mouth daily. , Disp: 30 Tab, Rfl: 11 
  BIOTIN, BULK,, by Does Not Apply route., Disp: , Rfl:  
  lysine (L-LYSINE) 500 mg tab tablet, Take  by mouth two (2) times a day., Disp: , Rfl:  
  multivitamin (ONE A DAY) tablet, Take 1 Tab by mouth daily. , Disp: , Rfl:  
  omega 3-DHA-EPA-fish oil (FISH OIL) 1,000 mg (120 mg-180 mg) capsule, Take 2 Caps by mouth daily. , Disp: , Rfl:  
  cetirizine (ZYRTEC) 10 mg tablet, Take 10 mg by mouth daily. , Disp: , Rfl:  
  acetaminophen (TYLENOL EXTRA STRENGTH) 500 mg tablet, Take 1,000 mg by mouth every six (6) hours as needed for Pain., Disp: , Rfl:  
  psyllium 0.52 gram capsule, Take 1 Cap by mouth daily (with lunch). , Disp: , Rfl:  
  calcium-cholecalciferol, d3, (CALCIUM 600 + D) 600-125 mg-unit tab, Take 1 Tab by mouth two (2) times a day., Disp: , Rfl:  
  esomeprazole (NEXIUM) 20 mg capsule, Take 1 Cap by mouth daily. , Disp: 90 Cap, Rfl: 3 Date Last Reviewed:  01-12-21 Number of Personal Factors/Comorbidities that affect the Plan of Care: 1-2: MODERATE COMPLEXITY EXAMINATION:  
 Observation/Orthostatic Postural Assessment: Pt walks with mild antalgic gait Palpation:   
      Mild tenderness L quadriceps and greater trochanter ROM:   
R Hip Flexion = 105 degrees R Hip Extension = 10 degrees R Hip Abduction = 45 degrees R Hip Adduction = 0 degrees R Knee Extension = 0 degrees R Knee Flexion = 135 degrees L Hip Flexion = 100 degrees L Hip Extension = 0 degrees L Hip Abduction = 35 degrees L Hip Adduction = 0 degrees L Knee Extension = 0 degrees L Knee Flexion = 130 degrees Lumbar ROM WDL Strength:   
R hip flexion = 4+/5 R hip abduction = 4+/5 R hip adduction = 5/5 R knee extension = 5/5 R knee flexion = 5/5 
R ankle dorsiflexion = 5/5 
R ankle plantarflexion = 5/5 L hip flexion = 4-/5 L hip abduction = 4-/5 L hip adduction = 4/5 L knee extension = 4+/5 L knee flexion = 4+/5 L ankle dorsiflexion = 5/5 L ankle plantarflexion = 5/5 Special Tests: 
Increased tightness L quad and hip flexor Neurological Screen: DTR's:  2+ to bilateral patellar and achilles Sensation: Intact to light touch bilateral LE's Functional Mobility:  
      Gait/Ambulation:  Pt walks with mild antalgic gait Transfers:  Pt able to transition sit to supine and supine to sit independently Body Structures Involved: 1. Bones 2. Joints 3. Muscles Body Functions Affected: 1. Sensory/Pain 2. Neuromusculoskeletal Activities and Participation Affected: 1. Mobility 2. Domestic Life Number of elements (examined above) that affect the Plan of Care: 3: MODERATE COMPLEXITY CLINICAL PRESENTATION:  
Presentation: Evolving clinical presentation with changing clinical characteristics: MODERATE COMPLEXITY CLINICAL DECISION MAKING:  
Use of outcome tool(s) and clinical judgement create a POC that gives a: Questionable prediction of patient's progress: MODERATE COMPLEXITY

## 2021-01-12 NOTE — PROGRESS NOTES
Rosario Hines  : 1950  [place]  Secondary: Randall Glynn 73 at [address]  [address], [address], [facility] [address]  [phone]   [phone]         OUTPATIENT PHYSICAL THERAPY: Daily Treatment Note 2021  Visit Count:  1    ICD-10: Treatment Diagnosis: Pain in left hip (M25.552)  Precautions/Allergies:   Sulfa (sulfonamide antibiotics)   TREATMENT PLAN:  Effective Dates: 2021 TO 2021 (90 days). Frequency/Duration: 2 times a week for 90 Day(s)    Pre-treatment Symptoms/Complaints:  L thigh/hip pain  Pain: Initial:   1/10 sitting at rest Post Session:  1/10   Medications Last Reviewed:  2021  Updated Objective Findings:  See evaluation note from today  TREATMENT:     THERAPEUTIC EXERCISE: (15 minutes):  Exercises per grid below to improve mobility and strength. Required minimal verbal cues to promote proper body alignment and promote proper body posture. Progressed resistance and repetitions as indicated. MANUAL THERAPY: (10 minutes): Manual L quad stretch in prone and L hip flexor stretching in side lying was utilized and necessary because of the patient's restricted motion of soft tissue. Date:  21 Date:   Date:     Activity/Exercise Parameters Parameters Parameters   Gluteal Sets 15 reps  5 sec holds     Quad Sets 15 reps  5 sec holds     T-band Hip Abduction  Green T-band  20 reps     Bridging 15 reps  5 sec holds                           [employer] Portal  Treatment/Session Summary:    · Response to Treatment:  Pt tolerated all treatments well today with no c/o. She stated manual stretching felt good. · Communication/Consultation:  None today  · Equipment provided today:  None today  · Recommendations/Intent for next treatment session: Next visit will focus on progression of stretching/strengthening exercises as tolerable.     Total Treatment Billable Duration:  25 minutes  PT Patient Time In/Time Out  Time In: 0845  Time Out: Saad 13, PT    Future Appointments   Date Time Provider Solomon Phyllis   1/14/2021  8:00 AM Jeffery Srinivasan, PT [facility] SFE   1/19/2021  8:45 AM Cheng Jay, PTA [facility] SFE   1/21/2021  8:45 AM Jeffery Srinivasan, PT [facility] SFE   1/22/2021  8:30 AM Horton, Leonel Spurling, MD END BS ENDO   1/26/2021  8:45 AM Cheng Jay, PTA [facility] SFE   1/28/2021  8:45 AM Jeffery Srinivasan, PT [facility] SFE   2/2/2021  8:45 AM Cheng Jay, PTA [facility] SFE   2/4/2021  8:45 AM Mark Perez, PTA [facility] SFE   5/17/2021 10:30 AM GCC LABORATORY [facility] GCC   5/17/2021 11:00 AM SS GCCOPIG [facility]VL GCC   8/11/2021  8:00 AM IMD NURSE ONLY SSA IMD IMD   8/18/2021  9:00 AM Dayanara Gutierrez NP SSA IMD IMD

## 2021-01-14 ENCOUNTER — HOSPITAL ENCOUNTER (OUTPATIENT)
Dept: PHYSICAL THERAPY | Age: 71
Discharge: HOME | End: 2021-01-14
Payer: MEDICARE

## 2021-01-14 PROCEDURE — 97140 MANUAL THERAPY 1/> REGIONS: CPT

## 2021-01-14 PROCEDURE — 97110 THERAPEUTIC EXERCISES: CPT

## 2021-01-14 NOTE — PROGRESS NOTES
Goldy Hines  : 1950  [place]  Secondary: Randall Sotomayor at [place] [address] [facility], [address] [place] [address] [address], [address] [facility]  [phone]   [phone]         OUTPATIENT PHYSICAL THERAPY: Daily Treatment Note 2021  Visit Count:  2    ICD-10: Treatment Diagnosis: Pain in left hip (M25.552)  Precautions/Allergies:   Sulfa (sulfonamide antibiotics)   TREATMENT PLAN:  Effective Dates: 2021 TO 2021 (90 days). Frequency/Duration: 2 times a week for 90 Day(s)    Pre-treatment Symptoms/Complaints:  L thigh/hip pain; Pt states her hip is feeling better this morning. Pain 0/10. She states the green theraband is getting easy. Pain: Initial:   0/10 sitting at rest Post Session:  0/10   Medications Last Reviewed:  2021  Updated Objective Findings:  None Today  TREATMENT:     THERAPEUTIC EXERCISE: (30 minutes):  Exercises per grid below to improve mobility and strength. Required minimal verbal cues to promote proper body alignment and promote proper body posture. Progressed resistance and repetitions as indicated. MANUAL THERAPY: (10 minutes): Manual L quad stretch in prone and L hip flexor stretching in side lying was utilized and necessary because of the patient's restricted motion of soft tissue.       Date:  21 Date:  21 Date:     Activity/Exercise Parameters Parameters Parameters   Gluteal Sets 15 reps  5 sec holds 15 reps  5 sec holds    Quad Sets 15 reps  5 sec holds 15 reps  5 sec holds    T-band Hip Abduction  Green T-band  20 reps Blue t-band  20 reps    Bridging 15 reps  5 sec holds 15 reps  5 sec holds    NuStep  Level 4  6 minutes    Standing Marching, Hip Abduction, Hip Extension and Knee Flexion  20 reps each    Step-Ups  6 inch step  20 reps    Sit to Stands  15 reps    LAQ's  20 reps    Hip Adduction with Ball  15 reps  5 sec holds        [employer] Portal  Treatment/Session Summary: · Response to Treatment:  Pt tolerated all treatments well today with no c/o. She stated manual stretching felt good. She did well with progression of exercises today. · Communication/Consultation:  None today  · Equipment provided today:  None today  · Recommendations/Intent for next treatment session: Next visit will focus on progression of stretching/strengthening exercises as tolerable.     Total Treatment Billable Duration:  40 minutes  PT Patient Time In/Time Out  Time In: 0800  Time Out: 20[address] [facility], PT    Future Appointments   Date Time Provider Solomon Ferguson   1/19/2021  8:45 AM Arcadio Talbert, PTA [facility] SFE   1/21/2021  8:45 AM Kenyon Guillaume, PT [facility] SFE   1/22/2021  8:30 AM Junior Sigala MD END BS ENDO   1/26/2021  8:45 AM Arcadio Talbert, PTA [facility] SFE   1/28/2021  8:45 AM Kenyon Guillaume, PT [facility] SFE   2/2/2021  8:45 AM Arcadio Talbert, PTA [facility] SFE   2/4/2021  8:45 AM Susannah Kellogg, PTA [facility] SFE   5/17/2021 10:30 AM GCC LABORATORY GCCLABG GVL GCC   5/17/2021 11:00 AM SS GCCOPIG GVL GCC   8/11/2021  8:00 AM IMD NURSE ONLY SSA IMD IMD   8/18/2021  9:00 AM Casi Grijalva NP SSA IMD IMD

## 2021-01-19 ENCOUNTER — HOSPITAL ENCOUNTER (OUTPATIENT)
Dept: PHYSICAL THERAPY | Age: 71
Discharge: HOME | End: 2021-01-19
Payer: MEDICARE

## 2021-01-19 PROCEDURE — 97110 THERAPEUTIC EXERCISES: CPT

## 2021-01-19 PROCEDURE — 97140 MANUAL THERAPY 1/> REGIONS: CPT

## 2021-01-19 NOTE — PROGRESS NOTES
Andre Hines  : 1950  [place]  Secondary: Randall Glynn 73 at [facility], [address] [place] [address], [facility] [address]  [phone]   [phone]         OUTPATIENT PHYSICAL THERAPY: Daily Treatment Note 2021  Visit Count:  3    ICD-10: Treatment Diagnosis: Pain in left hip (M25.552)  Precautions/Allergies:   Sulfa (sulfonamide antibiotics)   TREATMENT PLAN:  Effective Dates: 2021 TO 2021 (90 days). Frequency/Duration: 2 times a week for 90 Day(s)    Pre-treatment Symptoms/Complaints:  L thigh/hip pain;  Feeling good, if I lift my leg I get discomfort in groin area, but otherwise it is getting better  Pain 0/10. Pain: Initial:   0/10 sitting at rest Post Session:  0/10   Medications Last Reviewed:  2021  Updated Objective Findings:  None Today  TREATMENT:     THERAPEUTIC EXERCISE: (30 minutes):  Exercises per grid below to improve mobility and strength. Required minimal verbal cues to promote proper body alignment and promote proper body posture. Progressed resistance and repetitions as indicated. MANUAL THERAPY: (10 minutes): Manual L quad stretch in prone and L hip flexor stretching in side lying was utilized and necessary because of the patient's restricted motion of soft tissue.       Date:  21 Date:     Activity/Exercise Parameters Parameters   Gluteal Sets 15 reps  5 sec holds    Quad Sets 15 reps  5 sec holds    T-band Hip Abduction  Blue t-band  20 reps    Fall out stretch bilateral hips 3 x 10 sec holds bilaterally    Piriformis Stretch 3 x 20 sec holds bilaterally    Hip Capsule stretch 3 x 10 sec holds bilaterally    Bridging 15 reps  5 sec holds    NuStep Level 4  6 minutes    Standing Marching, Hip Abduction, Hip Extension and Knee Flexion 20 reps each      Step-Ups 6 inch step  20 reps    Sit to Stands 15 reps    LAQ's 20 reps    Hip Adduction with Ball 15 reps  5 sec holds [facility] Portal  Treatment/Session Summary:    · Response to Treatment:  Pt tolerated all treatments well today with no c/o. She stated manual stretching felt good. She did well with progression of exercises today. · Communication/Consultation:  None today  · Equipment provided today:  None today  · Recommendations/Intent for next treatment session: Next visit will focus on progression of stretching/strengthening exercises as tolerable.     Total Treatment Billable Duration:  40 minutes  PT Patient Time In/Time Out  Time In: 0845  Time Out: 0930  Ling Lux PTA    Future Appointments   Date Time Provider Solomon Ferguson   1/21/2021  8:45 AM Jeffery Srinivasan, PT [facility] SFE   1/22/2021  8:30 AM Horton, Leonel Spurling, MD END BS ENDO   1/26/2021  8:45 AM Cheng Jay, PTA [facility] SFE   1/28/2021  8:45 AM Jeffery Srinivasan, PT [facility] SFE   2/2/2021  8:45 AM Cheng Jay, PTA [facility] SFE   2/4/2021  8:45 AM Mark Perez, PTA [facility] SFE   5/17/2021 10:30 AM GCC LABORATORY GCC[facility] GVL GCC   5/17/2021 11:00 AM SS GCCOPIG GVL GCC   8/11/2021  8:00 AM IMD NURSE ONLY SSA IMD IMD   8/18/2021  9:00 AM Dayanara Gutierrez NP SSA IMD IMD

## 2021-01-21 ENCOUNTER — HOSPITAL ENCOUNTER (OUTPATIENT)
Dept: PHYSICAL THERAPY | Age: 71
Discharge: HOME | End: 2021-01-21
Payer: MEDICARE

## 2021-01-21 PROCEDURE — 97110 THERAPEUTIC EXERCISES: CPT

## 2021-01-21 PROCEDURE — 97140 MANUAL THERAPY 1/> REGIONS: CPT

## 2021-01-21 NOTE — PROGRESS NOTES
Jyothi Hines  : 1950  [place]  Secondary: [facility] at [address], [address], [place] [address]  [phone]   [phone]         OUTPATIENT PHYSICAL THERAPY: Daily Treatment Note 2021  Visit Count:  4    ICD-10: Treatment Diagnosis: Pain in left hip (M25.552)  Precautions/Allergies:   Sulfa (sulfonamide antibiotics)   TREATMENT PLAN:  Effective Dates: 2021 TO 2021 (90 days).  Frequency/Duration: 2 times a week for 90 Day(s)    Pre-treatment Symptoms/Complaints:  L thigh/hip pain;  Pt states therapy is helping.  She states she had some lateral hip pain following her last session, but it's better today.  Pain: Initial:   0/10 sitting at rest Post Session:  0/10   Medications Last Reviewed:  2021  Updated Objective Findings:  None Today  TREATMENT:     THERAPEUTIC EXERCISE: (30 minutes):  Exercises per grid below to improve mobility and strength.  Required minimal verbal cues to promote proper body alignment and promote proper body posture.  Progressed resistance and repetitions as indicated.  MANUAL THERAPY: (10 minutes): Manual L quad stretch in prone and L hip flexor stretching in side lying was utilized and necessary because of the patient's restricted motion of soft tissue.      Date:  21 Date:  21   Activity/Exercise Parameters Parameters   Gluteal Sets 15 reps  5 sec holds 15 reps  5 sec holds   Quad Sets 15 reps  5 sec holds 15 reps  5 sec holds   T-band Hip Abduction  Blue t-band  20 reps Blue T-band  20 reps   Fall out stretch bilateral hips 3 x 10 sec holds bilaterally    Piriformis Stretch 3 x 20 sec holds bilaterally 3 reps  20 sec holds   Hip Capsule stretch 3 x 10 sec holds bilaterally 3 reps  20 sec holds   Bridging 15 reps  5 sec holds 15 reps  5 sec holds   NuStep Level 4  6 minutes Level 4  6 minutes   Standing Marching, Hip Abduction, Hip  Extension and Knee Flexion 20 reps each   20 reps each   Step-Ups 6 inch step  20 reps 6 inch step  20 reps   Sit to Stands 15 reps 15 reps   LAQ's 20 reps 1 Lb  20 reps   Hip Adduction with Ball 15 reps  5 sec holds 15 reps  5 sec holds       [employer] Portal  Treatment/Session Summary:    · Response to Treatment:  Pt tolerated all treatments well today with no c/o. Lowell Marion Overall pain improving. · Communication/Consultation:  None today  · Equipment provided today:  None today  · Recommendations/Intent for next treatment session: Next visit will focus on progression of stretching/strengthening exercises as tolerable.     Total Treatment Billable Duration:  40 minutes  PT Patient Time In/Time Out  Time In: 0845  Time Out: Saad Batista PT    Future Appointments   Date Time Provider Solomon Ferguson   1/22/2021  8:30 AM Marjorie Sigala MD END BS ENDO   1/26/2021  8:45 AM Glenn Beulah, PTA [facility] SFE   1/28/2021  8:45 AM Eric Paez, PT [facility] SFE   2/2/2021  8:45 AM Glenn Beulah, PTA [facility] SFE   2/4/2021  8:45 AM Glenn Beulah, PTA [facility] SFE   5/17/2021 10:30 AM GCC LABORATORY GCC[facility]G [facility] GCC   5/17/2021 11:00 AM SS GCCOPIG GVL GCC   8/11/2021  8:00 AM IMD NURSE ONLY SSA IMD IMD   8/18/2021  9:00 AM Radha Jaramillo NP SSA IMD IMD

## 2021-01-26 ENCOUNTER — HOSPITAL ENCOUNTER (OUTPATIENT)
Dept: PHYSICAL THERAPY | Age: 71
Discharge: HOME | End: 2021-01-26
Payer: MEDICARE

## 2021-01-26 PROCEDURE — 97110 THERAPEUTIC EXERCISES: CPT

## 2021-01-26 PROCEDURE — 97140 MANUAL THERAPY 1/> REGIONS: CPT

## 2021-01-26 NOTE — PROGRESS NOTES
Rosario Hines  : 1950  [place]  Secondary: Randall Sotomayor at [place] [id number] [facility], [address] [place] [address] [address], [address] [facility]  [phone]   [phone]         OUTPATIENT PHYSICAL THERAPY: Daily Treatment Note 2021  Visit Count:  5    ICD-10: Treatment Diagnosis: Pain in left hip (M25.552)  Precautions/Allergies:   Sulfa (sulfonamide antibiotics)   TREATMENT PLAN:  Effective Dates: 2021 TO 2021 (90 days). Frequency/Duration: 2 times a week for 90 Day(s)    Pre-treatment Symptoms/Complaints:  L thigh/hip pain;  \"I can tell I am getting better\". Pain: Initial:   0/10 sitting at rest Post Session:  0/10   Medications Last Reviewed:  2021  Updated Objective Findings:  None Today  TREATMENT:     THERAPEUTIC EXERCISE: (45 minutes):  Exercises per grid below to improve mobility and strength. Required minimal verbal cues to promote proper body alignment and promote proper body posture. Progressed resistance and repetitions as indicated.      Date:  21 Date:  21   Activity/Exercise Parameters Parameters   Gluteal Sets 15 reps  5 sec holds HEP   Quad Sets 15 reps  5 sec holds 15 reps  5 sec holds   T-band Hip Abduction  Blue t-band  20 reps Blue T-band  20 reps   Tband sideways walk  Green x 2 laps    Fall out stretch bilateral hips 3 x 10 sec holds bilaterally x   Piriformis Stretch 3 x 20 sec holds bilaterally 3 reps  20 sec holds   Hip Capsule stretch 3 x 10 sec holds bilaterally 3 reps  20 sec holds   S/L hip abduction  #2 x 15 reps   Manually stretching Quad and Hip flexor  Yes to increase flexability   SLR  #2 x15 Bilaterally   Bridging 15 reps  5 sec holds 15 reps  5 sec holds   NuStep Level 4  6 minutes Level 4  6 minutes   Standing Marching, Hip Abduction, Hip Extension and Knee Flexion 20 reps each   20 reps each  #1   Step-Ups 6 inch step  20 reps 8 inch step  20 reps   Sit to Stands 15 reps 15 reps   LAQ's 20 reps 2 Lb Bilaterally  20 reps   Hip Adduction with Ball 15 reps  5 sec holds 15 reps  5 sec holds       MedBridge Portal  Treatment/Session Summary:    · Response to Treatment:  Pt tolerated all treatments well today with no c/o. Olesya Austerlitz Overall pain improving. Good Progress in PT at this time. · Communication/Consultation:  None today  · Equipment provided today:  None today  · Recommendations/Intent for next treatment session: Next visit will focus on progression of stretching/strengthening exercises as tolerable.     Total Treatment Billable Duration:  40 minutes  PT Patient Time In/Time Out  Time In: 0845  Time Out: 0930  Edie Alicea PTA    Future Appointments   Date Time Provider Solomon Ferguson   1/26/2021  8:45 AM Seth Pump, PTA [facility] SFE   1/28/2021  8:45 AM Lisa Lash, PT SFEORPT SFE   2/2/2021  8:45 AM Seth Pump, PTA SFEORPT SFE   2/4/2021  8:45 AM Seth Pump, PTA SFEORPT SFE   5/17/2021 10:30 AM GCC LABORATORY GCCLABG [facility] GCC   5/17/2021 11:00 AM SS GCCOPIG GVL GCC   7/28/2021  8:30 AM Darío Sigala MD END BS ENDO   8/11/2021  8:00 AM IMD NURSE ONLY [facility] IMD IMD   8/18/2021  9:00 AM CHAR Dixon IMD IMD

## 2021-01-28 ENCOUNTER — HOSPITAL ENCOUNTER (OUTPATIENT)
Dept: PHYSICAL THERAPY | Age: 71
Discharge: HOME | End: 2021-01-28
Payer: MEDICARE

## 2021-01-28 PROCEDURE — 97110 THERAPEUTIC EXERCISES: CPT

## 2021-01-28 NOTE — PROGRESS NOTES
Alex Butt Repaire  : 1950  [place]  Secondary: Randall Sotomayor at [place] [id number] [facility], [address] [place] [address],[address], [address] [facility]  [phone]   [phone]         OUTPATIENT PHYSICAL THERAPY: Daily Treatment Note 2021  Visit Count:  6    ICD-10: Treatment Diagnosis: Pain in left hip (M25.552)  Precautions/Allergies:   Sulfa (sulfonamide antibiotics)   TREATMENT PLAN:  Effective Dates: 2021 TO 2021 (90 days). Frequency/Duration: 2 times a week for 90 Day(s)    Pre-treatment Symptoms/Complaints:  L thigh/hip pain;  Pt states her L hip continues to improve. Pain: Initial:   0/10 sitting at rest Post Session:  0/10   Medications Last Reviewed:  2021  Updated Objective Findings:  None Today  TREATMENT:     THERAPEUTIC EXERCISE: (45 minutes):  Exercises per grid below to improve mobility and strength. Required minimal verbal cues to promote proper body alignment and promote proper body posture. Progressed resistance and repetitions as indicated.      Date:  21 Date:  21 Date:  21   Activity/Exercise Parameters Parameters    Gluteal Sets 15 reps  5 sec holds HEP    Quad Sets 15 reps  5 sec holds 15 reps  5 sec holds    T-band Hip Abduction  Blue t-band  20 reps Blue T-band  20 reps Blue T-band  20 reps   Tband sideways walk  Green x 2 laps  Green T-band  4x30 ft   Fall out stretch bilateral hips 3 x 10 sec holds bilaterally x    Piriformis Stretch 3 x 20 sec holds bilaterally 3 reps  20 sec holds 3 reps  20 sec holds   Hip Capsule stretch 3 x 10 sec holds bilaterally 3 reps  20 sec holds 3 reps  20 sec holds   S/L hip abduction  #2 x 15 reps    Manually stretching Quad and Hip flexor  Yes to increase flexability L LE   SLR  #2 x15 Bilaterally    Bridging 15 reps  5 sec holds 15 reps  5 sec holds 15 reps  5 sec holds   NuStep Level 4  6 minutes Level 4  6 minutes Level 4  6 minutes   Standing Marching, Hip Abduction, Hip Extension and Knee Flexion 20 reps each   20 reps each  #1 1 Lb  20 reps each   Step-Ups 6 inch step  20 reps 8 inch step  20 reps 8 inch step  20 reps   Sit to Stands 15 reps 15 reps 15 reps   LAQ's 20 reps 2 Lb Bilaterally  20 reps    Hip Adduction with Ball 15 reps  5 sec holds 15 reps  5 sec holds    Nautilus Leg Press   85 Lbs  20 reps       MedBridge Portal  Treatment/Session Summary:    · Response to Treatment:  Pt tolerated all treatments well today with no c/o. Luis Eduardo Confer Pain improving. Try Nautilus Leg Ext and Leg Curl machines next visit as pt wants to transition to [facility]. · Communication/Consultation:  None today  · Equipment provided today:  None today  · Recommendations/Intent for next treatment session: Next visit will focus on progression of stretching/strengthening exercises as tolerable.     Total Treatment Billable Duration:  45 minutes  PT Patient Time In/Time Out  Time In: 0845  Time Out: Saad Batista PT    Future Appointments   Date Time Provider Solomon Ferguson   2/2/2021  8:45 AM Dominique Castellon PTA [facility] SFE   2/4/2021  8:45 AM Dominique Castellon PTA SFEORPT SFE   5/17/2021 10:30 AM GCC LABORATORY GCCLABG GVL GCC   5/17/2021 11:00 AM SS GCCOPIG GVL GCC   7/28/2021  8:30 AM Ricardo Sigala MD END BS ENDO   8/11/2021  8:00 AM IMD NURSE ONLY SSA IMD IMD   8/18/2021  9:00 AM Coreen Griffiths NP SSA IMD IMD

## 2021-02-02 ENCOUNTER — HOSPITAL ENCOUNTER (OUTPATIENT)
Dept: PHYSICAL THERAPY | Age: 71
Discharge: HOME | End: 2021-02-02
Payer: MEDICARE

## 2021-02-02 PROCEDURE — 97110 THERAPEUTIC EXERCISES: CPT

## 2021-02-02 NOTE — PROGRESS NOTES
Micheal Mistry Repaire  : 1950  [place]  Secondary: Randall Sotomayor at [facility] [address], [address] [facility] [address], [facility] 91.  [phone]   [phone]         OUTPATIENT PHYSICAL THERAPY: Daily Treatment Note 2021  Visit Count:  7    ICD-10: Treatment Diagnosis: Pain in left hip (M25.552)  Precautions/Allergies:   Sulfa (sulfonamide antibiotics)   TREATMENT PLAN:  Effective Dates: 2021 TO 2021 (90 days). Frequency/Duration: 2 times a week for 90 Day(s)    Pre-treatment Symptoms/Complaints:  L thigh/hip pain;  \"Doing well, but hurts when I do the wrong thing, like getting out of bed\". Pain: Initial:   0/10 sitting at rest Post Session:  0/10   Medications Last Reviewed:  2021  Updated Objective Findings:  None Today  TREATMENT:     THERAPEUTIC EXERCISE: (45 minutes):  Exercises per grid below to improve mobility and strength. Required minimal verbal cues to promote proper body alignment and promote proper body posture. Progressed resistance and repetitions as indicated.      Date:  21 Date:  21   Activity/Exercise Parameters    Gluteal Sets HEP HEP   Quad Sets 15 reps  5 sec holds HEP   T-band Hip Abduction  Blue T-band  20 reps Blue T-band  20 reps   Active Hamstring stretch  30 pumps   Tband sideways walk Green x 2 laps  Green T-band  4x30 ft   Fall out stretch bilateral hips x x   Piriformis Stretch 3 reps  20 sec holds 3 reps  20 sec holds   Hip Capsule stretch 3 reps  20 sec holds 3 reps  20 sec holds   S/L hip abduction #2 x 15 reps x   Manually stretching Quad and Hip flexor Yes to increase flexability L LE   SLR #2 x15 Bilaterally x   Bridging 15 reps  5 sec holds 15 reps  5 sec holds   NuStep Level 4  6 minutes Level 4  6 minutes   Standing Marching, Hip Abduction, Hip Extension and Knee Flexion 20 reps each  #1 1 Lb  20 reps each   Step-Ups 8 inch step  20 reps 8 inch step  20 reps Sit to Stands 15 reps 15 reps   LAQ's 2 Lb Bilaterally  20 reps x   Hip Adduction with Ball 15 reps  5 sec holds x   Nautilus Leg Press  85 Lbs  20 reps       MedBridge Portal  Treatment/Session Summary:    · Response to Treatment:  Pt tolerated all treatments well today with no c/o. Luis Eduardo Confer Pain improving. Try Nautilus Leg Ext and Leg Curl machines next visit as pt wants to transition to [facility]. · Communication/Consultation:  None today  · Equipment provided today:  None today  · Recommendations/Intent for next treatment session: Next visit will focus on progression of stretching/strengthening exercises as tolerable.     Total Treatment Billable Duration:  45 minutes  PT Patient Time In/Time Out  Time In: 0845  Time Out: Em Saravia, PTA    Future Appointments   Date Time Provider Solomon Ferguson   2/2/2021  8:45 AM Dominique Castellon PTA [facility] SFE   2/4/2021  8:45 AM Dominique Castellon PTA EORPT E   5/17/2021 10:30 AM GCC LABORATORY GCCLABG GVL GCC   5/17/2021 11:00 AM SS GCCOPIG GVL GCC   7/28/2021  8:30 AM Ricardo Sigala MD END BS ENDO   8/11/2021  8:00 AM IMD NURSE ONLY SSA IMD IMD   8/18/2021  9:00 AM CHAR Mancuso IMD IMD

## 2021-02-04 ENCOUNTER — HOSPITAL ENCOUNTER (OUTPATIENT)
Dept: PHYSICAL THERAPY | Age: 71
Discharge: HOME | End: 2021-02-04
Payer: MEDICARE

## 2021-02-04 PROCEDURE — 97110 THERAPEUTIC EXERCISES: CPT

## 2021-02-04 NOTE — PROGRESS NOTES
Luis Alberto Hines  : 1950  [place]  Secondary: Randall Glynn 75 at [facility] [address], [address] [place] [address],[address], [facility] 91.  [phone]   [phone]         OUTPATIENT PHYSICAL THERAPY: Daily Treatment Note 2021  Visit Count:  8    ICD-10: Treatment Diagnosis: Pain in left hip (M25.552)  Precautions/Allergies:   Sulfa (sulfonamide antibiotics)   TREATMENT PLAN:  Effective Dates: 2021 TO 2021 (90 days). Frequency/Duration: 2 times a week for 90 Day(s)    Pre-treatment Symptoms/Complaints:  L thigh/hip pain;  \"I am having no pain, doing good\". Pain: Initial:   0/10 sitting at rest Post Session:  0/10   Medications Last Reviewed:  2021  Updated Objective Findings:  None Today  TREATMENT:     THERAPEUTIC EXERCISE: (45 minutes):  Exercises per grid below to improve mobility and strength. Required minimal verbal cues to promote proper body alignment and promote proper body posture. Progressed resistance and repetitions as indicated.      Date:  21 Date:  21   Activity/Exercise Parameters    Gluteal Sets HEP HEP   Quad Sets 15 reps  5 sec holds HEP   T-band Hip Abduction  Blue T-band  20 reps Blue T-band  20 reps   Active Hamstring stretch  30 pumps   Tband sideways walk Green x 2 laps  Green T-band  4x30 ft   Fall out stretch bilateral hips x x   Piriformis Stretch 3 reps  20 sec holds 3 reps  20 sec holds   Hip Capsule stretch 3 reps  20 sec holds 3 reps  20 sec holds   S/L hip abduction #2 x 15 reps #2 x 20 reps   Manually stretching Quad and Hip flexor Yes to increase flexability L LE   SLR #2 x15 Bilaterally #2 x 20 reps   Bridging 15 reps  5 sec holds 15 reps  5 sec holds   NuStep Level 4  6 minutes Level 4  6 minutes   Standing Marching, Hip Abduction, Hip Extension and Knee Flexion 20 reps each  #1 2 Lb  20 reps each   Step-Ups 8 inch step  20 reps 8 inch step  20 reps   Sit to Stands 15 reps 15 reps   LAQ's 2 Lb Bilaterally  20 reps #2 x 30 reps 5 sec holds bilaterally   Hip Adduction with Ball 15 reps  5 sec holds x   Nautilus Leg curl  #40 x 20 reps   Nautilus Leg Extension  #40 x 20 reps   Nautilus Leg Press  85 Lbs  20 reps       MedBridge Portal  Treatment/Session Summary:    · Response to Treatment:  Pt tolerated all treatments well today with no c/o. .  Added machines for LE today. · Communication/Consultation:  None today  · Equipment provided today:  None today  · Recommendations/Intent for next treatment session: Next visit will focus on progression of stretching/strengthening exercises as tolerable.     Total Treatment Billable Duration:  45 minutes  PT Patient Time In/Time Out  Time In: 0845  Time Out: 0930  Gurjit Cantu PTA    Future Appointments   Date Time Provider Solomon Ferguson   2/4/2021 10:30 AM SPC COVID VACCINE SSA SPC SPC   5/17/2021 10:30 AM GCC LABORATORY GCCLABG GVL GCC   5/17/2021 11:00 AM SS GCCOPIG GVL GCC   7/28/2021  8:30 AM Denis Sigala MD END BS ENDO   8/11/2021  8:00 AM IMD NURSE ONLY SSA IMD IMD   8/18/2021  9:00 AM Flora Felix NP SSA IMD IMD

## 2021-05-17 ENCOUNTER — HOSPITAL ENCOUNTER (OUTPATIENT)
Dept: INFUSION THERAPY | Age: 71
Discharge: HOME | End: 2021-05-17
Payer: MEDICARE

## 2021-05-17 ENCOUNTER — HOSPITAL ENCOUNTER (OUTPATIENT)
Dept: LAB | Age: 71
Discharge: HOME | End: 2021-05-17

## 2021-05-17 VITALS
RESPIRATION RATE: 18 BRPM | HEART RATE: 75 BPM | DIASTOLIC BLOOD PRESSURE: 68 MMHG | TEMPERATURE: 98.1 F | SYSTOLIC BLOOD PRESSURE: 138 MMHG

## 2021-05-17 LAB — CALCIUM SERPL-MCNC: 8.7 MG/DL (ref 8.3–10.4)

## 2021-05-17 PROCEDURE — 74011250636 HC RX REV CODE- 250/636: Performed by: NURSE PRACTITIONER

## 2021-05-17 PROCEDURE — 96372 THER/PROPH/DIAG INJ SC/IM: CPT

## 2021-05-17 PROCEDURE — 82310 ASSAY OF CALCIUM: CPT

## 2021-05-17 PROCEDURE — 36415 COLL VENOUS BLD VENIPUNCTURE: CPT

## 2021-05-17 RX ADMIN — DENOSUMAB 60 MG: 60 INJECTION SUBCUTANEOUS at 11:18

## 2021-05-17 NOTE — PROGRESS NOTES
Arrived to the [facility]. Prolia completed and tolerated well. Any issues or concerns during appointment: none  Patient given education on injection  Instructed patient to notify provider for any issues or worrisome symptoms. They verbalized understanding. Patient aware of next infusion appointment scheduled for - will need new order from MD Discharged ambulatory.

## 2021-08-11 ENCOUNTER — TRANSCRIBE ORDER (OUTPATIENT)
Dept: SCHEDULING | Age: 71
End: 2021-08-11

## 2021-08-11 DIAGNOSIS — Z12.31 VISIT FOR SCREENING MAMMOGRAM: Primary | ICD-10-CM

## 2021-08-18 ENCOUNTER — HOSPITAL ENCOUNTER (OUTPATIENT)
Dept: GENERAL RADIOLOGY | Age: 71
Discharge: HOME | End: 2021-08-18

## 2021-08-18 DIAGNOSIS — E78.2 MIXED HYPERLIPIDEMIA: ICD-10-CM

## 2021-08-18 DIAGNOSIS — R07.9 CHEST PAIN, UNSPECIFIED TYPE: ICD-10-CM

## 2021-08-18 DIAGNOSIS — K21.9 GASTROESOPHAGEAL REFLUX DISEASE WITHOUT ESOPHAGITIS: ICD-10-CM

## 2021-08-18 PROBLEM — M25.552 HIP PAIN, CHRONIC, LEFT: Status: ACTIVE | Noted: 2021-04-02

## 2021-08-18 PROBLEM — M76.892 TENDINITIS OF LEFT HIP FLEXOR: Status: ACTIVE | Noted: 2021-05-10

## 2021-08-18 PROBLEM — G89.29 HIP PAIN, CHRONIC, LEFT: Status: ACTIVE | Noted: 2021-04-02

## 2021-08-18 PROBLEM — M21.70 LEG LENGTH DISCREPANCY: Status: ACTIVE | Noted: 2021-05-10

## 2021-08-19 NOTE — PROGRESS NOTES
Your chest x-ray picks up on mild calcifications in the aorta. Recommend low fat, low cholesterol diet. We can consider using a statin to lower the cholesterol also. Lung fields are ok.

## 2021-08-24 NOTE — THERAPY DISCHARGE
Avinash Hines  : 1950  [place]  Secondary: Randall Sotomayor at [place] [address] [facility], [place] [address].  [phone]   [phone]           OUTPATIENT PHYSICAL THERAPY:Discharge Summary 2021   ICD-10: Treatment Diagnosis: Pain in left hip (M25.552); Low back pain (M54.5)  Precautions/Allergies:   Sulfa (sulfonamide antibiotics)   TREATMENT PLAN:  Effective Dates: 2021 TO 2021 (90 days). Frequency/Duration: 2 times a week for 90 Day(s) MEDICAL/REFERRING DIAGNOSIS:  hip   DATE OF ONSET: Chronic L thigh pain   REFERRING PHYSICIAN: Niki Mac MD MD Orders: Evaluate and Treat  Return MD Appointment: Pt has no follow up appt at this time     INITIAL ASSESSMENT:  Ms. Emil Pascal was last seen for PT on 21. She was having no pain on that visit. Discharge from PT to independent [facility]. PROBLEM LIST (Impacting functional limitations):  1. Decreased Strength  2. Decreased ADL/Functional Activities  3. Increased Pain  4. Decreased Flexibility/Joint Mobility  5. Decreased [place] with Home Exercise Program INTERVENTIONS PLANNED: (Treatment may consist of any combination of the following)  1. Balance Exercise  2. Cold  3. Cryotherapy  4. Electrical Stimulation  5. Gait Training  6. Heat  7. Home Exercise Program (HEP)  8. Manual Therapy  9. Range of Motion (ROM)  10. Therapeutic Exercise/Strengthening     GOALS: (Goals have been discussed and agreed upon with patient.)  Short-Term Functional Goals: Time Frame: 4 weeks  1. Pt will increase ROM L hip flexion = 105 degrees, extension = 10 degrees, abduction = 45 degrees to assist with household ADL's  2. Pt will increase strength L hip 4/5 to assist with household ADL's  3. Pt will be independent with HEP  Discharge Goals: Time Frame: 12 weeks  1. Pt will increase strength L hip 4+/5 to assist with household ADL's  2.  Pt will ascend/descend 10 eight-inch steps using a reciprocal pattern independently with no c/o L hip pain  3. Pt will perform 20 minutes household cleaning activities independently with min to no c/o L hip pain    OUTCOME MEASURE:   Tool Used: Lower Extremity Functional Scale (LEFS)  Score:  Initial: 49/80 Most Recent: X/80 (Date: -- )   Interpretation of Score: 20 questions each scored on a 5 point scale with 0 representing \"extreme difficulty or unable to perform\" and 4 representing \"no difficulty\". The lower the score, the greater the functional disability. 80/80 represents no disability. Minimal detectable change is 9 points. MEDICAL NECESSITY:   · Patient is expected to demonstrate progress in strength, range of motion and functional technique to increase independence with self care and household ADL's. · Patient demonstrates good rehab potential due to higher previous functional level. REASON FOR SERVICES/OTHER COMMENTS:  · Patient continues to require skilled intervention due to decreased ROM/strength L hip with increased pain leading to decreased functional status. Total Duration:  PT Patient Time In/Time Out  Time In: 0845  Time Out: 0930    Rehabilitation Potential For Stated Goals: Good  Regarding Scotty Hines's therapy, I certify that the treatment plan above will be carried out by a therapist or under their direction. Thank you for this referral,  Chris Oglesby PT     Referring Physician Signature: Ángel Flores MD _______________________________ Date _____________     PAIN/SUBJECTIVE:   Initial:   1/10 sitting at rest Post Session:  1/10   HISTORY:   History of Injury/Illness (Reason for Referral):  Pt has history of R ERNIE in 2016 and L ERNIE in 2018. She was attending physical therapy for L thigh/hip pain in spring of 2020 prior to [place]. She states the therapy was helping. Pt states she has started having worsening of her L thigh/hip pain again.   Pt currently c/o pain in her L anterior thigh. Pt had x-rays which she states are normal.  Pt states the problem is muscular. She rates her current L LE pain 1/10 sitting at rest, increasing to 8/10 at worst.  Aggravating factors include lifting her L LE into the car, rolling over in the bed, lifting her L LE to put her shoes on, putting her pants on and household cleaning activities. She reports some mild difficulties ascending/descending stairs due to her L thigh pain and not having a railing. Pt denies any LE numbness/tingling or c/o LBP. Past Medical History/Comorbidities:   Ms. Matthew Santos  has a past medical history of Anxiety disorder, Chronic rhinitis, Dysfunction of eustachian tube, Gastroesophageal reflux disease, Hyperlipidemia, Mild mitral regurgitation, Osteopenia, Seasonal allergic rhinitis, and Tinnitus. She also has no past medical history of Difficult intubation, Malignant hyperthermia due to anesthesia, Nausea & vomiting, Pseudocholinesterase deficiency, or Unspecified adverse effect of anesthesia. Ms. Matthew Santos  has a past surgical history that includes hx colonoscopy (01/23/2012); hx tonsillectomy (childhood); hx hip replacement (Right, 11/2016); hx hip replacement (Left, 04/2018); and hx ihsan and bso (2003). Social History/Living Environment:     Pt is single. She lives in a [place] house with one step to enter from the garage. Prior Level of Function/Work/Activity:  Pt is retired  Dominant Side:         RIGHT  Other Clinical Tests:          X-rays of L hip and thigh negative  Personal Factors:          Sex:  female        Age:  66 y.o. Profession:  Pt is retired    Ambulatory/Rehab [address] [place] [address] Risk Assessment   Risk Factors:       No Risk Factors Identified Ability to Rise from Chair:       (1)  Pushes up, successful in one attempt   [place] 50:       No modifications necessary   Total: (5 or greater = High Risk): 1   ©2010 [employer] States Patent #[id number]. Federal Law prohibits the replication, distribution or use without written permission from [facility]   Current Medications:       Current Outpatient Medications:   •  montelukast (SINGULAIR) 10 mg tablet, Take 1 Tablet by mouth daily. Indications: inflammation of the nose due to an allergy, Disp: 90 Tablet, Rfl: 3  •  methIMAzole (TAPAZOLE) 5 mg tablet, Take 0.5 Tablets by mouth daily., Disp: 15 Tablet, Rfl: 11  •  denosumab (Prolia) 60 mg/mL injection, 60 mg by SubCUTAneous route every 6 months., Disp: , Rfl:   •  fluticasone propionate (Flonase Allergy Relief) 50 mcg/actuation nasal spray, 2 Sprays by Both Nostrils route daily. Indications: inflammation of the nose due to an allergy, Disp: 3 Bottle, Rfl: 3  •  BIOTIN, BULK,, by Does Not Apply route., Disp: , Rfl:   •  lysine (L-LYSINE) 500 mg tab tablet, Take  by mouth two (2) times a day. (Patient not taking: Reported on 8/18/2021), Disp: , Rfl:   •  multivitamin (ONE A DAY) tablet, Take 1 Tab by mouth daily., Disp: , Rfl:   •  omega 3-DHA-EPA-fish oil (FISH OIL) 1,000 mg (120 mg-180 mg) capsule, Take 2 Caps by mouth daily., Disp: , Rfl:   •  acetaminophen (TYLENOL EXTRA STRENGTH) 500 mg tablet, Take 1,000 mg by mouth every six (6) hours as needed for Pain., Disp: , Rfl:   •  psyllium 0.52 gram capsule, Take 1 Cap by mouth daily (with lunch)., Disp: , Rfl:   •  calcium-cholecalciferol, d3, (CALCIUM 600 + D) 600-125 mg-unit tab, Take 1 Tab by mouth two (2) times a day., Disp: , Rfl:    Date Last Reviewed:  01-12-21   Number of Personal Factors/Comorbidities that affect the Plan of Care: 1-2: MODERATE COMPLEXITY   EXAMINATION:   Observation/Orthostatic Postural Assessment:          Pt walks with mild antalgic gait  Palpation:          Mild tenderness L quadriceps and greater trochanter  ROM:    R Hip Flexion = 105 degrees  R Hip Extension = 10 degrees  R Hip Abduction = 45 degrees  R Hip Adduction = 0 degrees  R Knee Extension = 0  degrees  R Knee Flexion = 135 degrees    L Hip Flexion = 100 degrees  L Hip Extension = 0 degrees  L Hip Abduction = 35 degrees  L Hip Adduction = 0 degrees  L Knee Extension = 0 degrees  L Knee Flexion = 130 degrees    Lumbar ROM WDL    Strength:    R hip flexion = 4+/5  R hip abduction = 4+/5  R hip adduction = 5/5  R knee extension = 5/5  R knee flexion = 5/5  R ankle dorsiflexion = 5/5  R ankle plantarflexion = 5/5    L hip flexion = 4-/5  L hip abduction = 4-/5  L hip adduction = 4/5  L knee extension = 4+/5  L knee flexion = 4+/5  L ankle dorsiflexion = 5/5  L ankle plantarflexion = 5/5    Special Tests:  Increased tightness L quad and hip flexor    Neurological Screen:        DTR's:  2+ to bilateral patellar and achilles        Sensation: Intact to light touch bilateral LE's  Functional Mobility:         Gait/Ambulation:  Pt walks with mild antalgic gait        Transfers:  Pt able to transition sit to supine and supine to sit independently    Body Structures Involved:  1. Bones  2. Joints  3. Muscles Body Functions Affected:  1. Sensory/Pain  2. Neuromusculoskeletal Activities and Participation Affected:  1. Mobility  2.  Domestic Life   Number of elements (examined above) that affect the Plan of Care: 3: MODERATE COMPLEXITY   CLINICAL PRESENTATION:   Presentation: Evolving clinical presentation with changing clinical characteristics: MODERATE COMPLEXITY   CLINICAL DECISION MAKING:   Use of outcome tool(s) and clinical judgement create a POC that gives a: Questionable prediction of patient's progress: MODERATE COMPLEXITY

## 2021-12-07 ENCOUNTER — HOSPITAL ENCOUNTER (OUTPATIENT)
Dept: MAMMOGRAPHY | Age: 71
Discharge: HOME | End: 2021-12-07
Attending: NURSE PRACTITIONER
Payer: MEDICARE

## 2021-12-07 DIAGNOSIS — Z12.31 VISIT FOR SCREENING MAMMOGRAM: ICD-10-CM

## 2021-12-07 PROCEDURE — 77063 BREAST TOMOSYNTHESIS BI: CPT

## 2022-03-18 PROBLEM — R09.02 HYPOXIA: Status: ACTIVE | Noted: 2018-06-15

## 2022-03-18 PROBLEM — G89.29 HIP PAIN, CHRONIC, LEFT: Status: ACTIVE | Noted: 2021-04-02

## 2022-03-18 PROBLEM — M25.552 HIP PAIN, CHRONIC, LEFT: Status: ACTIVE | Noted: 2021-04-02

## 2022-03-19 PROBLEM — Z96.642 H/O TOTAL HIP ARTHROPLASTY, LEFT: Status: ACTIVE | Noted: 2018-04-06

## 2022-03-19 PROBLEM — R00.2 PALPITATIONS: Status: ACTIVE | Noted: 2019-10-14

## 2022-03-19 PROBLEM — E04.1 THYROID NODULE: Status: ACTIVE | Noted: 2019-10-14

## 2022-03-19 PROBLEM — D64.9 ANEMIA: Status: ACTIVE | Noted: 2018-06-15

## 2022-03-19 PROBLEM — M21.70 LEG LENGTH DISCREPANCY: Status: ACTIVE | Noted: 2021-05-10

## 2022-03-19 PROBLEM — M76.892 TENDINITIS OF LEFT HIP FLEXOR: Status: ACTIVE | Noted: 2021-05-10

## 2022-03-19 PROBLEM — E05.90 HYPERTHYROIDISM: Status: ACTIVE | Noted: 2019-10-14

## 2022-03-19 PROBLEM — G47.61 PLMD (PERIODIC LIMB MOVEMENT DISORDER): Status: ACTIVE | Noted: 2018-06-15

## 2022-03-20 PROBLEM — G47.33 OSA (OBSTRUCTIVE SLEEP APNEA): Status: ACTIVE | Noted: 2018-06-15

## 2022-05-11 ENCOUNTER — NURSE TRIAGE (OUTPATIENT)
Dept: OTHER | Facility: CLINIC | Age: 72
End: 2022-05-11

## 2022-05-11 NOTE — TELEPHONE ENCOUNTER
Received call from [facility],Anish [address] at [facility] with [employer]. Subjective: Caller states \"she is dizzy, lightheaded, message on her Deborrah Lyell says she may have afib\"     Current Symptoms: see above, nothing now    Onset: 1 month ago; intermittent    Associated Symptoms: NA    Pain Severity: 0/10; n/a; n/a    Temperature: denies fever n/a    What has been tried: nothing    LMP: NA Pregnant: NA    Recommended disposition: See PCP within 24 Hours    Care advice provided, patient verbalizes understanding; denies any other questions or concerns; instructed to call back for any new or worsening symptoms. Patient/Caller agrees with recommended disposition; writer provided warm transfer to [facility] at [facility] for appointment scheduling    Attention Provider: Thank you for allowing me to participate in the care of your patient. The patient was connected to triage in response to information provided to the ECC. Please do not respond through this encounter as the response is not directed to a shared pool.         Reason for Disposition   [1] MODERATE dizziness (e.g., interferes with normal activities) AND [2] has NOT been evaluated by physician for this  (Exception: dizziness caused by heat exposure, sudden standing, or poor fluid intake)    Protocols used: [facility]

## 2022-05-17 DIAGNOSIS — R00.2 PALPITATIONS: ICD-10-CM

## 2022-05-17 DIAGNOSIS — E78.2 MIXED HYPERLIPIDEMIA: Primary | ICD-10-CM

## 2022-05-17 DIAGNOSIS — D64.9 ANEMIA, UNSPECIFIED TYPE: ICD-10-CM

## 2022-05-31 ENCOUNTER — INITIAL CONSULT (OUTPATIENT)
Dept: CARDIOLOGY CLINIC | Age: 72
End: 2022-05-31
Payer: MEDICARE

## 2022-05-31 VITALS
HEIGHT: 66 IN | HEART RATE: 82 BPM | DIASTOLIC BLOOD PRESSURE: 80 MMHG | SYSTOLIC BLOOD PRESSURE: 138 MMHG | WEIGHT: 170.5 LBS | BODY MASS INDEX: 27.4 KG/M2

## 2022-05-31 DIAGNOSIS — R00.2 PALPITATIONS: Primary | ICD-10-CM

## 2022-05-31 DIAGNOSIS — R07.2 PRECORDIAL PAIN: ICD-10-CM

## 2022-05-31 DIAGNOSIS — E78.00 PURE HYPERCHOLESTEROLEMIA: ICD-10-CM

## 2022-05-31 DIAGNOSIS — I34.0 MITRAL VALVE INSUFFICIENCY, UNSPECIFIED ETIOLOGY: ICD-10-CM

## 2022-05-31 PROCEDURE — 1090F PRES/ABSN URINE INCON ASSESS: CPT | Performed by: INTERNAL MEDICINE

## 2022-05-31 PROCEDURE — G8399 PT W/DXA RESULTS DOCUMENT: HCPCS | Performed by: INTERNAL MEDICINE

## 2022-05-31 PROCEDURE — 99204 OFFICE O/P NEW MOD 45 MIN: CPT | Performed by: INTERNAL MEDICINE

## 2022-05-31 PROCEDURE — 1123F ACP DISCUSS/DSCN MKR DOCD: CPT | Performed by: INTERNAL MEDICINE

## 2022-05-31 PROCEDURE — G8419 CALC BMI OUT NRM PARAM NOF/U: HCPCS | Performed by: INTERNAL MEDICINE

## 2022-05-31 PROCEDURE — 1036F TOBACCO NON-USER: CPT | Performed by: INTERNAL MEDICINE

## 2022-05-31 PROCEDURE — G8428 CUR MEDS NOT DOCUMENT: HCPCS | Performed by: INTERNAL MEDICINE

## 2022-05-31 PROCEDURE — 3017F COLORECTAL CA SCREEN DOC REV: CPT | Performed by: INTERNAL MEDICINE

## 2022-05-31 NOTE — PROGRESS NOTES
[address], [address], [address] [place] [address]  PHONE: [phone]           HISTORY AND PHYSICAL          SUBJECTIVE:   Teto Dougherty is a 66 y.o. female seen for a consultation visit regarding the following:     Chief Complaint   Patient presents with    Consultation    Palpitations            HPI:    [employer] bit says may be af. Some lateral tightness lasts. Brief episodes. NO aggravating or alleviating factors- some davis as well. Allergies   Allergen Reactions    Sulfa Antibiotics Hives and Rash     Past Medical History:   Diagnosis Date    Anxiety disorder     Chronic rhinitis     Dysfunction of eustachian tube     Gastroesophageal reflux disease     Hyperlipidemia     Mild mitral regurgitation     Osteopenia     Seasonal allergic rhinitis     Tinnitus      Past Surgical History:   Procedure Laterality Date    COLONOSCOPY  01/23/2012    diverticulosis, internal hemorrhoids (Dr. Miguel Angel Torres)    RAY AND BSO  2003    TONSILLECTOMY  childhood    TOTAL HIP ARTHROPLASTY Right 11/2016    TOTAL HIP ARTHROPLASTY Left 04/2018     Family History   Problem Relation Age of Onset    Breast Cancer Mother 36    Thyroid Disease Mother         treated with thyroidectomy    No Known Problems Daughter     No Known Problems Father     Mult Sclerosis Brother     No Known Problems Brother     No Known Problems Daughter     No Known Problems Son     Thyroid Cancer Neg Hx      Social History     Tobacco Use    Smoking status: Never Smoker    Smokeless tobacco: Never Used   Substance Use Topics    Alcohol use: Yes     Alcohol/week: 2.0 standard drinks         ROS:    Constitution: Negative for fever. Eyes: Negative for blurred vision. Respiratory: Negative for cough. Endocrine: Negative for cold intolerance and heat intolerance. Skin: Negative for rash. Musculoskeletal: Negative for myalgias. Gastrointestinal: Negative for diarrhea, nausea and vomiting. Genitourinary: Negative for dysuria. Neurological: Negative for headaches and numbness. PHYSICAL EXAM:     /80   Pulse 82   Ht 5' 5.5\" (1.664 m)   Wt 170 lb 8 oz (77.3 kg)   BMI 27.94 kg/m²    Constitutional: Oriented to person, place, and time. Appears well-developed and well-nourished. Head: Normocephalic and atraumatic. Neck: Neck supple. Cardiovascular: Normal rate and regular rhythm with no murmur -No JVP  Pulmonary/Chest: Breath sounds normal.   Abdominal: Soft. Musculoskeletal: No edema. Neurological: Alert and oriented to person, place, and time. Skin: Skin is warm and dry. Psychiatric: Normal mood and affect. Vitals reviewed        Medical problems and test results were reviewed with the patient today. No results found for any visits on 05/31/22.       Recent Results (from the past 672 hour(s))   CBC with Auto Differential    Collection Time: 05/13/22  9:57 AM   Result Value Ref Range    WBC 4.6 3.4 - 10.8 x10E3/uL    RBC 4.42 3.77 - 5.28 x10E6/uL    Hemoglobin 13.7 11.1 - 15.9 g/dL    Hematocrit 40.8 34.0 - 46.6 %    MCV 92 79 - 97 fL    MCH 31.0 26.6 - 33.0 pg    MCHC 33.6 31.5 - 35.7 g/dL    RDW 12.0 11.7 - 15.4 %    Platelets 275 050 - 672 x10E3/uL    Neutrophils % 55 Not Estab. %    Lymphocytes % 34 Not Estab. %    Monocytes % 9 Not Estab. %    Eosinophils % 1 Not Estab. %    Basophils % 1 Not Estab. %    Neutrophils Absolute 2.5 1.4 - 7.0 x10E3/uL    Lymphocytes Absolute 1.5 0.7 - 3.1 x10E3/uL    Monocytes Absolute 0.4 0.1 - 0.9 x10E3/uL    Eosinophils Absolute 0.1 0.0 - 0.4 x10E3/uL    Basophils Absolute 0.0 0.0 - 0.2 x10E3/uL    Immature Granulocytes 0 Not Estab. %    GRANULOCYTE ABSOLUTE COUNT 0.0 0.0 - 0.1 x10E3/uL   CKD REPORT    Collection Time: 05/13/22  9:57 AM   Result Value Ref Range    Interpretation Note    Magnesium    Collection Time: 05/13/22  9:57 AM   Result Value Ref Range    Magnesium 2.2 1.6 - 2.3 mg/dL   TSH 3RD GENERATION    Collection

## 2022-06-01 DIAGNOSIS — E04.1 THYROID NODULE: ICD-10-CM

## 2022-06-01 DIAGNOSIS — E05.90 HYPERTHYROIDISM: Primary | ICD-10-CM

## 2022-06-28 ENCOUNTER — OFFICE VISIT (OUTPATIENT)
Dept: CARDIOLOGY CLINIC | Age: 72
End: 2022-06-28
Payer: MEDICARE

## 2022-06-28 VITALS
DIASTOLIC BLOOD PRESSURE: 70 MMHG | WEIGHT: 172 LBS | HEART RATE: 80 BPM | SYSTOLIC BLOOD PRESSURE: 118 MMHG | HEIGHT: 65 IN | BODY MASS INDEX: 28.66 KG/M2

## 2022-06-28 DIAGNOSIS — R00.2 PALPITATIONS: Primary | ICD-10-CM

## 2022-06-28 DIAGNOSIS — R07.2 PRECORDIAL PAIN: ICD-10-CM

## 2022-06-28 DIAGNOSIS — E78.00 PURE HYPERCHOLESTEROLEMIA: ICD-10-CM

## 2022-06-28 PROCEDURE — 1123F ACP DISCUSS/DSCN MKR DOCD: CPT | Performed by: INTERNAL MEDICINE

## 2022-06-28 PROCEDURE — 3017F COLORECTAL CA SCREEN DOC REV: CPT | Performed by: INTERNAL MEDICINE

## 2022-06-28 PROCEDURE — 99214 OFFICE O/P EST MOD 30 MIN: CPT | Performed by: INTERNAL MEDICINE

## 2022-06-28 PROCEDURE — G8399 PT W/DXA RESULTS DOCUMENT: HCPCS | Performed by: INTERNAL MEDICINE

## 2022-06-28 PROCEDURE — 1036F TOBACCO NON-USER: CPT | Performed by: INTERNAL MEDICINE

## 2022-06-28 PROCEDURE — G8428 CUR MEDS NOT DOCUMENT: HCPCS | Performed by: INTERNAL MEDICINE

## 2022-06-28 PROCEDURE — 1090F PRES/ABSN URINE INCON ASSESS: CPT | Performed by: INTERNAL MEDICINE

## 2022-06-28 PROCEDURE — G8417 CALC BMI ABV UP PARAM F/U: HCPCS | Performed by: INTERNAL MEDICINE

## 2022-06-28 NOTE — PROGRESS NOTES
Neurological: Alert and oriented to person, place, and time. Skin: Skin is warm and dry. Psychiatric: Normal mood and affect. Vitals reviewed. Wt Readings from Last 3 Encounters:   06/28/22 172 lb (78 kg)   06/03/22 170 lb (77.1 kg)   05/31/22 170 lb 8 oz (77.3 kg)       Medical problems and test results were reviewed with the patient today. ASSESSMENT and PLAN    1. Palpitations  Resolved- pacs and pvcs on monitor- add mag if symprtoms recur    2. Pure hypercholesterolemia  Stable. Continue fish oil and exercise      3. Precordial pain  Resolved. Continue current meds  Normal stress test       Return in about 6 months (around 12/28/2022).          David Gunderson MD  6/28/2022  8:29 AM

## 2022-07-28 ENCOUNTER — OFFICE VISIT (OUTPATIENT)
Dept: ENDOCRINOLOGY | Age: 72
End: 2022-07-28
Payer: MEDICARE

## 2022-07-28 VITALS
WEIGHT: 173 LBS | SYSTOLIC BLOOD PRESSURE: 136 MMHG | HEART RATE: 82 BPM | BODY MASS INDEX: 28.79 KG/M2 | OXYGEN SATURATION: 97 % | DIASTOLIC BLOOD PRESSURE: 76 MMHG

## 2022-07-28 DIAGNOSIS — E04.1 THYROID NODULE: ICD-10-CM

## 2022-07-28 DIAGNOSIS — E05.90 HYPERTHYROIDISM: Primary | ICD-10-CM

## 2022-07-28 PROCEDURE — 1123F ACP DISCUSS/DSCN MKR DOCD: CPT | Performed by: INTERNAL MEDICINE

## 2022-07-28 PROCEDURE — 3017F COLORECTAL CA SCREEN DOC REV: CPT | Performed by: INTERNAL MEDICINE

## 2022-07-28 PROCEDURE — G8399 PT W/DXA RESULTS DOCUMENT: HCPCS | Performed by: INTERNAL MEDICINE

## 2022-07-28 PROCEDURE — G8417 CALC BMI ABV UP PARAM F/U: HCPCS | Performed by: INTERNAL MEDICINE

## 2022-07-28 PROCEDURE — 99213 OFFICE O/P EST LOW 20 MIN: CPT | Performed by: INTERNAL MEDICINE

## 2022-07-28 PROCEDURE — 1036F TOBACCO NON-USER: CPT | Performed by: INTERNAL MEDICINE

## 2022-07-28 PROCEDURE — 1090F PRES/ABSN URINE INCON ASSESS: CPT | Performed by: INTERNAL MEDICINE

## 2022-07-28 PROCEDURE — G8428 CUR MEDS NOT DOCUMENT: HCPCS | Performed by: INTERNAL MEDICINE

## 2022-07-28 RX ORDER — METHIMAZOLE 5 MG/1
2.5 TABLET ORAL DAILY
Qty: 15 TABLET | Refills: 11 | Status: SHIPPED | OUTPATIENT
Start: 2022-07-28

## 2022-07-28 ASSESSMENT — ENCOUNTER SYMPTOMS
TROUBLE SWALLOWING: 0
VOICE CHANGE: 0
DIARRHEA: 0
CONSTIPATION: 0

## 2022-07-28 NOTE — PROGRESS NOTES
Daxa Luna MD, [address]            Reason for visit: Follow-up of hyperthyroidism      ASSESSMENT AND PLAN:    1. Hyperthyroidism  She is biochemically euthyroid on her current dose of methimazole. Continue it as prescribed. - methIMAzole (TAPAZOLE) 5 MG tablet; Take 0.5 tablets by mouth in the morning. Dispense: 15 tablet; Refill: 11  - TSH; Future  - T4, Free; Future  - T3; Future  - Hepatic Function Panel; Future    2. Thyroid nodule  Per ACR criteria, ongoing ultrasound follow-up is not necessary. Follow-up and Dispositions    Return in about 6 months (around 1/28/2023). History of Present Illness:    THYROID DYSFUNCTION  Laura Ballard is seen for follow-up of hyperthyroidism; this was diagnosed in July 2019. Current symptoms: See review of systems below     Prior treatment: Methimazole 5 mg daily was started 10/14/2019. Her dose was decreased to 2.5 mg daily 12/17/2019. Pertinent labs:  6/26/2015: TSH 3.390, free T4 1.30.  7/1/2016: TSH 0.830, free T4 1.30.  7/6/2017: TSH 1.960, free T4 1.17.  7/19/2018: TSH 1.830, free T4 1.24.  7/30/2019: TSH 0.028, free T4 1.71.  8/23/2019: TSH 0.033, free T4 1.60, free T3 2.7.  10/14/2019: TSH 0.090, free T4 1.52, T3 109, thyrotropin receptor antibodies less than 1.10 (-).  12/16/2019: TSH 3.150, free T4 1.04, T3 88.  3/16/2020: TSH 3.410, free T4 1.11, T3 102.  7/20/2020: TSH 2.740, free T4 1.04, T3 97.  1/20/2021: TSH 3.440, free T4 1.20, T3 102.  7/26/2021: TSH 2.250, free T4 1.30, T3 125.  8/11/2021: TSH 1.460.  1/27/2022: TSH 3.220, free T4 1.30, T3 100.  5/13/2022: TSH 2.180.  7/26/2022: TSH 3.540, free T4 1.20, T3 108. Imaging:  10/14/2019: Ultrasound ([place])- Right lobe 2.23 x 1.65 x 3.75 cm, isthmus 0.53 cm, left lobe 1.47 x 1.08 x 2.99 cm. Heterogeneous echotexture. Mildly increased blood flow.   0.53 x 0.81 x 1.03 cm isoechoic nodule with mildly increased peripheral but not central blood flow and no calcifications in the anterior midportion of the right lobe. 7/22/2020: Ultrasound ([place])- Right lobe 1.57 x 1.68 x 3.58 cm, isthmus 0.33 cm, left lobe 1.59 x 1.23 x 3.35 cm. Heterogeneous echotexture. Normal blood flow. 0.91 x 0.85 x 1.03 cm isoechoic nodule with mildly increased peripheral but not central blood flow and no calcifications in the anterior midportion of the right lobe. 7/28/2021: Ultrasound ([place])- Right lobe 1.55 x 1.48 x 4.08 cm, isthmus 0.46 cm, left lobe 1.53 x 1.41 x 3.06 cm. Heterogeneous echotexture. Mildly increased blood flow. 0.61 x 0.91 x 1.00 cm isoechoic nodule with mildly increased peripheral but not central blood flow and no calcifications in the anterior midportion of the right lobe. Review of Systems   Constitutional:  Negative for fatigue and unexpected weight change. HENT:  Negative for trouble swallowing and voice change. Cardiovascular:  Positive for palpitations (rare). Gastrointestinal:  Negative for constipation and diarrhea. /76 (Site: Left Upper Arm, Position: Sitting)   Pulse 82   Wt 173 lb (78.5 kg)   SpO2 97%   BMI 28.79 kg/m²   Wt Readings from Last 3 Encounters:   07/28/22 173 lb (78.5 kg)   06/28/22 172 lb (78 kg)   06/03/22 170 lb (77.1 kg)       Physical Exam  HENT:      Head: Normocephalic. Neck:      Thyroid: No thyroid mass or thyromegaly. Cardiovascular:      Rate and Rhythm: Normal rate. Pulmonary:      Effort: No respiratory distress. Breath sounds: Normal breath sounds. Neurological:      Mental Status: She is alert.    Psychiatric:         Mood and Affect: Mood normal.         Behavior: Behavior normal.       Orders Placed This Encounter   Procedures    TSH     Standing Status:   Future     Standing Expiration Date:   7/28/2023    T4, Free     Standing Status:   Future     Standing Expiration Date:   7/28/2023    T3     Standing Status:   Future     Standing Expiration Date:   7/28/2023    Hepatic Function Panel     Standing Status:   Future     Standing Expiration Date:   7/28/2023         Current Outpatient Medications   Medication Sig Dispense Refill    methIMAzole (TAPAZOLE) 5 MG tablet Take 0.5 tablets by mouth in the morning. 15 tablet 11    Omega-3 Fatty Acids (FISH OIL PO) Take by mouth      Cetirizine HCl (ZYRTEC PO) Take by mouth      Multiple Vitamin (DAILY-VITAMIN PO) Take by mouth      Calcium Carbonate-Vitamin D (CALCIUM-VITAMIN D) 600-125 MG-UNIT TABS Take 1 tablet by mouth 2 times daily      fluticasone (FLONASE) 50 MCG/ACT nasal spray 2 sprays by Nasal route daily      psyllium 0.52 g capsule Take 1 capsule by mouth Daily with lunch       No current facility-administered medications for this visit. Rebekah Plascencia MD, FACE      Portions of this note were generated with the assistance of voice recognition software. As such, some errors in transcription may be present.

## 2022-08-11 SDOH — HEALTH STABILITY: PHYSICAL HEALTH: ON AVERAGE, HOW MANY DAYS PER WEEK DO YOU ENGAGE IN MODERATE TO STRENUOUS EXERCISE (LIKE A BRISK WALK)?: 5 DAYS

## 2022-08-11 SDOH — HEALTH STABILITY: PHYSICAL HEALTH: ON AVERAGE, HOW MANY MINUTES DO YOU ENGAGE IN EXERCISE AT THIS LEVEL?: 30 MIN

## 2022-08-11 ASSESSMENT — PATIENT HEALTH QUESTIONNAIRE - PHQ9
SUM OF ALL RESPONSES TO PHQ QUESTIONS 1-9: 0
2. FEELING DOWN, DEPRESSED OR HOPELESS: 0
SUM OF ALL RESPONSES TO PHQ9 QUESTIONS 1 & 2: 0
1. LITTLE INTEREST OR PLEASURE IN DOING THINGS: 0
SUM OF ALL RESPONSES TO PHQ QUESTIONS 1-9: 0

## 2022-08-11 ASSESSMENT — LIFESTYLE VARIABLES
HOW MANY STANDARD DRINKS CONTAINING ALCOHOL DO YOU HAVE ON A TYPICAL DAY: 98
HOW OFTEN DO YOU HAVE A DRINK CONTAINING ALCOHOL: 4
HOW OFTEN DO YOU HAVE SIX OR MORE DRINKS ON ONE OCCASION: 1
HOW OFTEN DO YOU HAVE A DRINK CONTAINING ALCOHOL: 2-3 TIMES A WEEK
HOW MANY STANDARD DRINKS CONTAINING ALCOHOL DO YOU HAVE ON A TYPICAL DAY: PATIENT DECLINED

## 2022-08-18 ENCOUNTER — OFFICE VISIT (OUTPATIENT)
Dept: INTERNAL MEDICINE CLINIC | Facility: CLINIC | Age: 72
End: 2022-08-18
Payer: MEDICARE

## 2022-08-18 VITALS
DIASTOLIC BLOOD PRESSURE: 68 MMHG | WEIGHT: 174 LBS | TEMPERATURE: 97.2 F | HEART RATE: 83 BPM | HEIGHT: 65 IN | BODY MASS INDEX: 28.99 KG/M2 | SYSTOLIC BLOOD PRESSURE: 132 MMHG | OXYGEN SATURATION: 97 %

## 2022-08-18 DIAGNOSIS — Z00.00 MEDICARE ANNUAL WELLNESS VISIT, SUBSEQUENT: Primary | ICD-10-CM

## 2022-08-18 DIAGNOSIS — D64.9 ANEMIA, UNSPECIFIED TYPE: ICD-10-CM

## 2022-08-18 DIAGNOSIS — R00.2 PALPITATIONS: ICD-10-CM

## 2022-08-18 DIAGNOSIS — E78.2 MIXED HYPERLIPIDEMIA: ICD-10-CM

## 2022-08-18 PROBLEM — M25.552 HIP PAIN, CHRONIC, LEFT: Status: RESOLVED | Noted: 2021-04-02 | Resolved: 2022-08-18

## 2022-08-18 PROBLEM — G89.29 HIP PAIN, CHRONIC, LEFT: Status: RESOLVED | Noted: 2021-04-02 | Resolved: 2022-08-18

## 2022-08-18 PROBLEM — E05.90 HYPERTHYROIDISM: Chronic | Status: ACTIVE | Noted: 2019-10-14

## 2022-08-18 PROBLEM — E04.1 THYROID NODULE: Chronic | Status: ACTIVE | Noted: 2019-10-14

## 2022-08-18 PROBLEM — G47.61 PLMD (PERIODIC LIMB MOVEMENT DISORDER): Status: RESOLVED | Noted: 2018-06-15 | Resolved: 2022-08-18

## 2022-08-18 LAB
ALBUMIN SERPL-MCNC: 3.7 G/DL (ref 3.2–4.6)
ALBUMIN/GLOB SERPL: 1.3 {RATIO} (ref 1.2–3.5)
ALP SERPL-CCNC: 115 U/L (ref 50–136)
ALT SERPL-CCNC: 22 U/L (ref 12–65)
ANION GAP SERPL CALC-SCNC: 6 MMOL/L (ref 7–16)
AST SERPL-CCNC: 20 U/L (ref 15–37)
BASOPHILS # BLD: 0 K/UL (ref 0–0.2)
BASOPHILS NFR BLD: 1 % (ref 0–2)
BILIRUB SERPL-MCNC: 0.6 MG/DL (ref 0.2–1.1)
BUN SERPL-MCNC: 23 MG/DL (ref 8–23)
CALCIUM SERPL-MCNC: 9.2 MG/DL (ref 8.3–10.4)
CHLORIDE SERPL-SCNC: 108 MMOL/L (ref 98–107)
CHOLEST SERPL-MCNC: 192 MG/DL
CO2 SERPL-SCNC: 28 MMOL/L (ref 21–32)
CREAT SERPL-MCNC: 1.1 MG/DL (ref 0.6–1)
DIFFERENTIAL METHOD BLD: ABNORMAL
EOSINOPHIL # BLD: 0.1 K/UL (ref 0–0.8)
EOSINOPHIL NFR BLD: 3 % (ref 0.5–7.8)
ERYTHROCYTE [DISTWIDTH] IN BLOOD BY AUTOMATED COUNT: 11.9 % (ref 11.9–14.6)
GLOBULIN SER CALC-MCNC: 2.8 G/DL (ref 2.3–3.5)
GLUCOSE SERPL-MCNC: 90 MG/DL (ref 65–100)
HCT VFR BLD AUTO: 43.5 % (ref 35.8–46.3)
HDLC SERPL-MCNC: 42 MG/DL (ref 40–60)
HDLC SERPL: 4.6 {RATIO}
HGB BLD-MCNC: 14.2 G/DL (ref 11.7–15.4)
IMM GRANULOCYTES # BLD AUTO: 0 K/UL (ref 0–0.5)
IMM GRANULOCYTES NFR BLD AUTO: 0 % (ref 0–5)
LDLC SERPL CALC-MCNC: 126.6 MG/DL
LYMPHOCYTES # BLD: 1.4 K/UL (ref 0.5–4.6)
LYMPHOCYTES NFR BLD: 35 % (ref 13–44)
MAGNESIUM SERPL-MCNC: 2.5 MG/DL (ref 1.8–2.4)
MCH RBC QN AUTO: 31.1 PG (ref 26.1–32.9)
MCHC RBC AUTO-ENTMCNC: 32.6 G/DL (ref 31.4–35)
MCV RBC AUTO: 95.2 FL (ref 79.6–97.8)
MONOCYTES # BLD: 0.3 K/UL (ref 0.1–1.3)
MONOCYTES NFR BLD: 7 % (ref 4–12)
NEUTS SEG # BLD: 2.2 K/UL (ref 1.7–8.2)
NEUTS SEG NFR BLD: 54 % (ref 43–78)
NRBC # BLD: 0 K/UL (ref 0–0.2)
PLATELET # BLD AUTO: 213 K/UL (ref 150–450)
PMV BLD AUTO: 11 FL (ref 9.4–12.3)
POTASSIUM SERPL-SCNC: 4.4 MMOL/L (ref 3.5–5.1)
PROT SERPL-MCNC: 6.5 G/DL (ref 6.3–8.2)
RBC # BLD AUTO: 4.57 M/UL (ref 4.05–5.2)
SODIUM SERPL-SCNC: 142 MMOL/L (ref 136–145)
TRIGL SERPL-MCNC: 117 MG/DL (ref 35–150)
VLDLC SERPL CALC-MCNC: 23.4 MG/DL (ref 6–23)
WBC # BLD AUTO: 4.1 K/UL (ref 4.3–11.1)

## 2022-08-18 PROCEDURE — 1123F ACP DISCUSS/DSCN MKR DOCD: CPT | Performed by: NURSE PRACTITIONER

## 2022-08-18 PROCEDURE — 3017F COLORECTAL CA SCREEN DOC REV: CPT | Performed by: NURSE PRACTITIONER

## 2022-08-18 PROCEDURE — G0439 PPPS, SUBSEQ VISIT: HCPCS | Performed by: NURSE PRACTITIONER

## 2022-08-18 NOTE — PROGRESS NOTES
Medicare Annual Wellness Visit    Ghada Glaser is here for Medicare AWV    Assessment & Plan   Medicare annual wellness visit, subsequent    Recommendations for Preventive Services Due: see orders and patient instructions/AVS.  Recommended screening schedule for the next 5-10 years is provided to the patient in written form: see Patient Instructions/AVS.     Return for Medicare Annual Wellness Visit in 1 year. Subjective       Patient's complete Health Risk Assessment and screening values have been reviewed and are found in Flowsheets. The following problems were reviewed today and where indicated follow up appointments were made and/or referrals ordered. Positive Risk Factor Screenings with Interventions:             General Health and ACP:  General  In general, how would you say your health is?: Excellent  In the past 7 days, have you experienced any of the following: New or Increased Pain, New or Increased Fatigue, Loneliness, Social Isolation, Stress or Anger?: No  Do you get the social and emotional support that you need?: (!) No  Do you have a Living Will?: Yes    Advance Directives       Power of  Living Will ACP-Advance Directive ACP-Power of     Not on File Filed on 12/02/16 Filed Not on File          General Health Risk Interventions:  No Living Will: Advance Care Planning addressed with patient today              Objective   Vitals:    08/18/22 0919   BP: 132/68   Site: Left Upper Arm   Position: Sitting   Cuff Size: Small Adult   Pulse: 83   Temp: 97.2 °F (36.2 °C)   TempSrc: Temporal   SpO2: 97%   Weight: 174 lb (78.9 kg)   Height: 5' 5\" (1.651 m)      Body mass index is 28.96 kg/m². Allergies   Allergen Reactions    Sulfa Antibiotics Hives and Rash     Prior to Visit Medications    Medication Sig Taking? Authorizing Provider   methIMAzole (TAPAZOLE) 5 MG tablet Take 0.5 tablets by mouth in the morning.  Yes Rafael Knox MD   Omega-3 Fatty Acids (FISH OIL PO) Take by mouth Yes Historical Provider, MD   Cetirizine HCl (ZYRTEC PO) Take by mouth Yes Historical Provider, MD   Multiple Vitamin (DAILY-VITAMIN PO) Take by mouth Yes Historical Provider, MD   Calcium Carbonate-Vitamin D (CALCIUM-VITAMIN D) 600-125 MG-UNIT TABS Take 1 tablet by mouth 2 times daily Yes Ar Automatic Reconciliation   fluticasone (FLONASE) 50 MCG/ACT nasal spray 2 sprays by Nasal route daily Yes Ar Automatic Reconciliation   psyllium 0.52 g capsule Take 1 capsule by mouth Daily with lunch Yes Ar Automatic Reconciliation       CareTeam (Including outside providers/suppliers regularly involved in providing care):   Patient Care Team:  BAUTISTA Olson NP as PCP - [address] [place] [address] BAUTISTA Umanzor NP as PCP - [facility] Empaneled Provider  BAUTISTA Olson NP as Nurse Practitioner  Ministerio Scott MD as Consulting Physician (Cardiology)  Kathyleen Koyanagi, MD (Endocrinology)       Reviewed and updated this visit:  Tobacco  Allergies  Meds  Problems  Med Hx  Surg Hx  Soc Hx  Fam Hx

## 2022-08-18 NOTE — PATIENT INSTRUCTIONS
Personalized Preventive Plan for Suzy Martin - 8/18/2022  Medicare offers a range of preventive health benefits. Some of the tests and screenings are paid in full while other may be subject to a deductible, co-insurance, and/or copay. Some of these benefits include a comprehensive review of your medical history including lifestyle, illnesses that may run in your family, and various assessments and screenings as appropriate. After reviewing your medical record and screening and assessments performed today your provider may have ordered immunizations, labs, imaging, and/or referrals for you. A list of these orders (if applicable) as well as your Preventive Care list are included within your After Visit Summary for your review. Other Preventive Recommendations:    A preventive eye exam performed by an eye specialist is recommended every 1-2 years to screen for glaucoma; cataracts, macular degeneration, and other eye disorders. A preventive dental visit is recommended every 6 months. Try to get at least 150 minutes of exercise per week or 10,000 steps per day on a pedometer . Order or download the FREE \"Exercise & Physical Activity: Your Everyday Guide\" from [employer]. Call 8-539.780.4347 or search [employer] online. You need 4827-3267 mg of calcium and 9856-9330 IU of vitamin D per day. It is possible to meet your calcium requirement with diet alone, but a vitamin D supplement is usually necessary to meet this goal.  When exposed to the sun, use a sunscreen that protects against both UVA and UVB radiation with an SPF of 30 or greater. Reapply every 2 to 3 hours or after sweating, drying off with a towel, or swimming. Always wear a seat belt when traveling in a car. Always wear a helmet when riding a bicycle or motorcycle.

## 2022-08-23 ENCOUNTER — PATIENT MESSAGE (OUTPATIENT)
Dept: INTERNAL MEDICINE CLINIC | Facility: CLINIC | Age: 72
End: 2022-08-23

## 2022-08-23 ENCOUNTER — OFFICE VISIT (OUTPATIENT)
Dept: INTERNAL MEDICINE CLINIC | Facility: CLINIC | Age: 72
End: 2022-08-23
Payer: MEDICARE

## 2022-08-23 VITALS
OXYGEN SATURATION: 96 % | BODY MASS INDEX: 29.19 KG/M2 | HEIGHT: 65 IN | SYSTOLIC BLOOD PRESSURE: 138 MMHG | WEIGHT: 175.2 LBS | HEART RATE: 76 BPM | TEMPERATURE: 97.5 F | DIASTOLIC BLOOD PRESSURE: 76 MMHG

## 2022-08-23 DIAGNOSIS — E78.00 PURE HYPERCHOLESTEROLEMIA: Primary | ICD-10-CM

## 2022-08-23 DIAGNOSIS — E05.90 HYPERTHYROIDISM: ICD-10-CM

## 2022-08-23 DIAGNOSIS — Z12.31 ENCOUNTER FOR SCREENING MAMMOGRAM FOR BREAST CANCER: ICD-10-CM

## 2022-08-23 DIAGNOSIS — M85.89 OSTEOPENIA OF MULTIPLE SITES: Primary | ICD-10-CM

## 2022-08-23 DIAGNOSIS — M85.89 OSTEOPENIA OF MULTIPLE SITES: ICD-10-CM

## 2022-08-23 DIAGNOSIS — J30.2 SEASONAL ALLERGIC RHINITIS, UNSPECIFIED TRIGGER: ICD-10-CM

## 2022-08-23 DIAGNOSIS — R61 NIGHT SWEATS: ICD-10-CM

## 2022-08-23 PROCEDURE — G8417 CALC BMI ABV UP PARAM F/U: HCPCS | Performed by: NURSE PRACTITIONER

## 2022-08-23 PROCEDURE — 1036F TOBACCO NON-USER: CPT | Performed by: NURSE PRACTITIONER

## 2022-08-23 PROCEDURE — G8427 DOCREV CUR MEDS BY ELIG CLIN: HCPCS | Performed by: NURSE PRACTITIONER

## 2022-08-23 PROCEDURE — 3017F COLORECTAL CA SCREEN DOC REV: CPT | Performed by: NURSE PRACTITIONER

## 2022-08-23 PROCEDURE — G8399 PT W/DXA RESULTS DOCUMENT: HCPCS | Performed by: NURSE PRACTITIONER

## 2022-08-23 PROCEDURE — 1123F ACP DISCUSS/DSCN MKR DOCD: CPT | Performed by: NURSE PRACTITIONER

## 2022-08-23 PROCEDURE — 99214 OFFICE O/P EST MOD 30 MIN: CPT | Performed by: NURSE PRACTITIONER

## 2022-08-23 PROCEDURE — 1090F PRES/ABSN URINE INCON ASSESS: CPT | Performed by: NURSE PRACTITIONER

## 2022-08-23 RX ORDER — FLUTICASONE PROPIONATE 50 MCG
2 SPRAY, SUSPENSION (ML) NASAL DAILY
Qty: 3 EACH | Refills: 3 | Status: SHIPPED | OUTPATIENT
Start: 2022-08-23

## 2022-08-23 ASSESSMENT — PATIENT HEALTH QUESTIONNAIRE - PHQ9
SUM OF ALL RESPONSES TO PHQ9 QUESTIONS 1 & 2: 0
SUM OF ALL RESPONSES TO PHQ QUESTIONS 1-9: 0
1. LITTLE INTEREST OR PLEASURE IN DOING THINGS: 0
2. FEELING DOWN, DEPRESSED OR HOPELESS: 0
SUM OF ALL RESPONSES TO PHQ QUESTIONS 1-9: 0

## 2022-08-23 NOTE — PROGRESS NOTES
COMPREHENSIVE EVALUATION, ESTABLISHED PATIENT    SUBJECTIVE:   Tico Joya is a 74 y.o. female seen for a comprehensive evaluation of existing and new medical problems. Chief Complaint   Patient presents with    Annual Exam     HPI  Cholesterol Problem   The history is provided by the patient and medical records. This is a chronic problem. Episode onset: 2016. The problem occurs constantly. The problem has not changed since onset. Pertinent negatives include no chest pain, no headaches and no shortness of breath. GERD   The history is provided by the patient and medical records. This is a chronic problem. Episode onset: \"at least 10 years\" Pertinent negatives include no chest pain, no abdominal pain, no cough. Hot flashes. Inability to tolerate heat. Hysterectomy at age 52. HRT x 10 years. Has been off HRT since 2014. Has hot flashes since that time. Hyperthyroidism. Onset 2021. On methimazole. Now followed by endocrinology. Allergic rhinitis  Seasonal.  Worse in Spring and Fall. Using Zyrtec and Flonase with adequate control of symptoms. Osteopenia  Mild low bone density. On calcium with vitamin D replacement. Current Outpatient Medications   Medication Sig Dispense Refill    methIMAzole (TAPAZOLE) 5 MG tablet Take 0.5 tablets by mouth in the morning. 15 tablet 11    Omega-3 Fatty Acids (FISH OIL PO) Take by mouth      Cetirizine HCl (ZYRTEC PO) Take by mouth      Multiple Vitamin (DAILY-VITAMIN PO) Take by mouth      Calcium Carbonate-Vitamin D (CALCIUM-VITAMIN D) 600-125 MG-UNIT TABS Take 1 tablet by mouth 2 times daily      fluticasone (FLONASE) 50 MCG/ACT nasal spray 2 sprays by Nasal route daily      psyllium 0.52 g capsule Take 1 capsule by mouth Daily with lunch       No current facility-administered medications for this visit.      Patient Active Problem List   Diagnosis    Cyst of tendon sheath    Osteoarthritis    HLD (hyperlipidemia)    Hypoxia    H/O total hip arthroplasty, left    GERD (gastroesophageal reflux disease)    Hyperthyroidism    Seasonal allergic rhinitis    Thyroid nodule    Snoring    MR (mitral regurgitation)    Tendinitis of left hip flexor    Anemia    Palpitations    History of total right hip arthroplasty    Leg length discrepancy    DAPHNIE (obstructive sleep apnea)    Fatigue    Precordial pain     Past Medical History:   Diagnosis Date    Anxiety disorder     Chronic rhinitis     Dysfunction of eustachian tube     Gastroesophageal reflux disease     Hyperlipidemia     Mild mitral regurgitation     Osteopenia     Seasonal allergic rhinitis     Tinnitus      Past Surgical History:   Procedure Laterality Date    COLONOSCOPY  01/23/2012    diverticulosis, internal hemorrhoids (Dr. Natalee Cao)    RAY AND BSO (CERVIX REMOVED)  2003    TONSILLECTOMY  childhood    TOTAL HIP ARTHROPLASTY Right 11/2016    TOTAL HIP ARTHROPLASTY Left 04/2018     Social History     Tobacco Use    Smoking status: Never    Smokeless tobacco: Never   Substance Use Topics    Alcohol use:  Yes     Alcohol/week: 2.0 standard drinks     Family History   Problem Relation Age of Onset    Breast Cancer Mother 36    Thyroid Disease Mother         treated with thyroidectomy    No Known Problems Daughter     No Known Problems Father     Mult Sclerosis Brother     No Known Problems Brother     No Known Problems Daughter     No Known Problems Son     Thyroid Cancer Neg Hx      Immunization History   Administered Date(s) Administered    COVID-19, PFIZER PURPLE top, DILUTE for use, (age 15 y+), 30mcg/0.3mL 01/14/2021, 02/04/2021, 09/26/2021, 04/16/2022    Influenza Virus Vaccine 10/08/2014, 09/23/2015, 10/19/2018, 09/24/2021    Influenza, High Dose (Fluzone 65 yrs and older) 10/09/2018, 10/22/2019, 09/02/2020, 09/08/2020, 09/24/2021    PPD Test 11/29/2016, 04/05/2018    Pneumococcal Conjugate 13-valent ([id number]) 06/30/2015    Pneumococcal Vaccine 01/14/2014    Tdap (Boostrix, Adacel) 06/24/2013, 04/19/2022    Zoster Live (Zostavax) 08/09/2013, 02/07/2019    Zoster Recombinant (Shingrix) 02/07/2019, 04/11/2019, 04/11/2019     Health Maintenance Due   Topic Date Due    Pneumococcal 65+ years Vaccine (2 - PPSV23 or PCV20) 06/30/2016       Recent Results (from the past 672 hour(s))   Magnesium    Collection Time: 08/18/22  8:24 AM   Result Value Ref Range    Magnesium 2.5 (H) 1.8 - 2.4 mg/dL   CBC with Auto Differential    Collection Time: 08/18/22  8:24 AM   Result Value Ref Range    WBC 4.1 (L) 4.3 - 11.1 K/uL    RBC 4.57 4.05 - 5.2 M/uL    Hemoglobin 14.2 11.7 - 15.4 g/dL    Hematocrit 43.5 35.8 - 46.3 %    MCV 95.2 79.6 - 97.8 FL    MCH 31.1 26.1 - 32.9 PG    MCHC 32.6 31.4 - 35.0 g/dL    RDW 11.9 11.9 - 14.6 %    Platelets 707 600 - 806 K/uL    MPV 11.0 9.4 - 12.3 FL    nRBC 0.00 0.0 - 0.2 K/uL    Differential Type AUTOMATED      Seg Neutrophils 54 43 - 78 %    Lymphocytes 35 13 - 44 %    Monocytes 7 4.0 - 12.0 %    Eosinophils % 3 0.5 - 7.8 %    Basophils 1 0.0 - 2.0 %    Immature Granulocytes 0 0.0 - 5.0 %    Segs Absolute 2.2 1.7 - 8.2 K/UL    Absolute Lymph # 1.4 0.5 - 4.6 K/UL    Absolute Mono # 0.3 0.1 - 1.3 K/UL    Absolute Eos # 0.1 0.0 - 0.8 K/UL    Basophils Absolute 0.0 0.0 - 0.2 K/UL    Absolute Immature Granulocyte 0.0 0.0 - 0.5 K/UL   Lipid Panel    Collection Time: 08/18/22  8:24 AM   Result Value Ref Range    Cholesterol, Total 192 <200 MG/DL    Triglycerides 117 35 - 150 MG/DL    HDL 42 40 - 60 MG/DL    LDL Calculated 126.6 (H) <100 MG/DL    VLDL Cholesterol Calculated 23.4 (H) 6.0 - 23.0 MG/DL    Chol/HDL Ratio 4.6     Comprehensive Metabolic Panel    Collection Time: 08/18/22  8:24 AM   Result Value Ref Range    Sodium 142 136 - 145 mmol/L    Potassium 4.4 3.5 - 5.1 mmol/L    Chloride 108 (H) 98 - 107 mmol/L    CO2 28 21 - 32 mmol/L    Anion Gap 6 (L) 7 - 16 mmol/L    Glucose 90 65 - 100 mg/dL    BUN 23 8 - 23 MG/DL    Creatinine 1.10 (H) 0.6 - 1.0 MG/DL    GFR  >60 >60 ml/min/1.73m2    GFR Non-African American 52 (L) >60 ml/min/1.73m2    Calcium 9.2 8.3 - 10.4 MG/DL    Total Bilirubin 0.6 0.2 - 1.1 MG/DL    ALT 22 12 - 65 U/L    AST 20 15 - 37 U/L    Alk Phosphatase 115 50 - 136 U/L    Total Protein 6.5 6.3 - 8.2 g/dL    Albumin 3.7 3.2 - 4.6 g/dL    Globulin 2.8 2.3 - 3.5 g/dL    Albumin/Globulin Ratio 1.3 1.2 - 3.5         Review of Systems   Constitutional:  Negative for chills, fatigue and fever. HENT:  Positive for postnasal drip. Negative for sore throat, trouble swallowing and voice change. Eyes:  Negative for visual disturbance. Respiratory:  Negative for chest tightness, shortness of breath and wheezing. Cardiovascular:  Negative for chest pain, palpitations and leg swelling. Gastrointestinal:  Negative for abdominal pain, constipation, diarrhea, nausea and vomiting. Endocrine: Negative for polydipsia, polyphagia and polyuria. Genitourinary:  Negative for frequency. Musculoskeletal:  Negative for gait problem. Skin:  Negative for rash. Allergic/Immunologic: Positive for environmental allergies. Neurological:  Negative for weakness, numbness and headaches. Hematological:  Does not bruise/bleed easily. Psychiatric/Behavioral:  Negative for dysphoric mood. The patient is not nervous/anxious. OBJECTIVE:  /76 (Site: Left Upper Arm, Position: Sitting, Cuff Size: Small Adult)   Pulse 76   Temp 97.5 °F (36.4 °C) (Temporal)   Ht 5' 5\" (1.651 m)   Wt 175 lb 3.2 oz (79.5 kg)   SpO2 96%   BMI 29.15 kg/m²      Physical Exam  Vitals and nursing note reviewed. Exam conducted with a chaperone present Ajith Rothman). Constitutional:       Appearance: Normal appearance. She is not ill-appearing or diaphoretic. HENT:      Head: Normocephalic. Right Ear: Tympanic membrane, ear canal and external ear normal. No decreased hearing noted. Left Ear: Tympanic membrane, ear canal and external ear normal. No decreased hearing noted. Nose: Mucosal edema present. No congestion or rhinorrhea. Right Nostril: No epistaxis. Left Nostril: No epistaxis. Right Turbinates: Enlarged. Left Turbinates: Enlarged. Mouth/Throat:      Lips: Pink. Mouth: Mucous membranes are moist. No oral lesions. Dentition: Normal dentition. Tongue: No lesions. Palate: No mass. Pharynx: Oropharynx is clear. Uvula midline. No oropharyngeal exudate or posterior oropharyngeal erythema. Eyes:      General: Lids are normal.      Extraocular Movements: Extraocular movements intact. Conjunctiva/sclera: Conjunctivae normal.   Neck:      Thyroid: No thyroid mass or thyromegaly. Vascular: No carotid bruit or JVD. Trachea: Trachea and phonation normal.   Cardiovascular:      Rate and Rhythm: Normal rate and regular rhythm. Pulses:           Carotid pulses are 2+ on the right side and 2+ on the left side. Radial pulses are 2+ on the right side and 2+ on the left side. Dorsalis pedis pulses are 2+ on the right side and 2+ on the left side. Posterior tibial pulses are 2+ on the right side and 2+ on the left side. Heart sounds: Normal heart sounds, S1 normal and S2 normal.   Pulmonary:      Effort: Pulmonary effort is normal.      Breath sounds: Normal breath sounds. Chest:      Chest wall: No mass. Breasts:     Right: Normal.      Left: Normal.   Abdominal:      General: Abdomen is flat. Bowel sounds are normal.      Palpations: Abdomen is soft. There is no hepatomegaly or splenomegaly. Tenderness: There is no abdominal tenderness. There is no right CVA tenderness, left CVA tenderness, guarding or rebound. Hernia: No hernia is present. Genitourinary:     Comments: Deferred. Musculoskeletal:      Cervical back: Normal, normal range of motion and neck supple. No spinous process tenderness or muscular tenderness.       Thoracic back: Normal.      Lumbar back: Normal.      Right lower leg: No edema. Left lower leg: No edema. Right foot: Normal range of motion. Left foot: Normal range of motion. Feet:      Right foot:      Skin integrity: Skin integrity normal.      Left foot:      Skin integrity: Skin integrity normal.   Lymphadenopathy:      Head:      Right side of head: No submandibular, tonsillar, preauricular or posterior auricular adenopathy. Left side of head: No submandibular, tonsillar, preauricular or posterior auricular adenopathy. Cervical: No cervical adenopathy. Upper Body:      Right upper body: No supraclavicular, axillary or pectoral adenopathy. Left upper body: No supraclavicular, axillary or pectoral adenopathy. Skin:     General: Skin is warm and dry. Capillary Refill: Capillary refill takes less than 2 seconds. Neurological:      General: No focal deficit present. Mental Status: She is alert and oriented to person, place, and time. Cranial Nerves: No dysarthria or facial asymmetry. Motor: Motor function is intact. Gait: Gait is intact. Deep Tendon Reflexes:      Reflex Scores:       Bicep reflexes are 2+ on the right side and 2+ on the left side. Patellar reflexes are 2+ on the right side and 2+ on the left side. Psychiatric:         Attention and Perception: Attention normal.         Mood and Affect: Mood normal.         Speech: Speech normal.           ASSESSMENT and PLAN    1. Pure hypercholesterolemia  Assessment & Plan:   Borderline controlled, lifestyle modifications recommended  Consider CCS. 2. Seasonal allergic rhinitis, unspecified trigger  Assessment & Plan:   At goal, continue current medications  Orders:  -     fluticasone (FLONASE) 50 MCG/ACT nasal spray; 2 sprays by Nasal route daily, Disp-3 each, R-3Print  3. Night sweats  4. Hyperthyroidism  Assessment & Plan:   Monitored by specialist- no acute findings meriting change in the plan   On methimazole. Denies sore throat.   WBC 4.1.  5. Encounter for screening mammogram for breast cancer  -     ROSETTA Screening Bilateral; Future    Return in about 1 year (around 8/23/2023) for Follow-Up HLD, with labs. current treatment plan is effective, no change in therapy  lab results and schedule of future lab studies reviewed with patient  reviewed diet, exercise and weight control  cardiovascular risk and specific lipid/LDL goals reviewed  reviewed medications and side effects in detail  Regarding night sweats, hyperthyroidism now controlled. She has had sleep study in past.    DEXA and mammogram.      Donelda Section, APRN - NP    Dictated using voice recognition software.  Proofread, but unrecognized voice recognition errors may exist.

## 2022-08-24 NOTE — TELEPHONE ENCOUNTER
From: Cristiane Lyeva  To: Nika Faust  Sent: 8/23/2022 7:26 PM EDT  Subject: Bone Density scan    Its been 2 years since Stanley had a bone density scan. Do I need one this year?     Cristiane Leyva

## 2022-09-13 NOTE — PROGRESS NOTES
Problem: Mobility Impaired (Adult and Pediatric)  Goal: *Acute Goals and Plan of Care (Insert Text)  GOALS (1-4 days):  (1.)Ms. Hines will move from supine to sit and sit to supine  in bed with STAND BY ASSIST.    (2.)Ms. Hines will transfer from bed to chair and chair to bed with STAND BY ASSIST using the least restrictive device. (3.)Ms. Hines will ambulate with STAND BY ASSIST for 300 feet with the least restrictive device. (4.)Ms. Hines will ambulate up/down 3 steps with bilateral  railing with CONTACT GUARD ASSIST with no device. (5.)Ms. Hines will state/observe ERNIE precautions with 0 verbal cues. ________________________________________________________________________________________________      PHYSICAL THERAPY [facility]: Daily Note, AM 4/6/2018  INPATIENT: Hospital Day: 2  [place] / Product Type: Medicare /      NAME/AGE/GENDER: Netta Murrieta is a 69 y.o. female   PRIMARY DIAGNOSIS:  Unilateral primary osteoarthritis, left hip [M16.12]   Procedure(s) and Anesthesia Type:     * LEFT HIP ARTHROPLASTY TOTAL / Sherryle Slough / POD 1 - Spinal (Left)  ICD-10: Treatment Diagnosis:    · Pain in left hip (M25.552)  · Stiffness of Left Hip, Not elsewhere classified (M25.652)  · Difficulty in walking, Not elsewhere classified (R26.2)      ASSESSMENT:     Ms. Yaa Schneider presents supine on contact and agreeable to therapy assessment. Pt s/p left ERNIE on 4/5/18 and presents with decreased strength and range of motion left lower extremity and with decreased independence with functional mobility. At home she was independent with mobility without assistive devices, she is below her baseline and will benefit from skilled PT interventions to maximize independence with functional mobility and ERNIE exercises. Hope to progress in the morning. She plans to go home with home health PT and family support.   4/6 am she was supine upon arrival.  She performs exercises in the bed Pt arrived to preop from ER at 0600. Pt alert to self only. Obtained consent over the phone for L hip surgery with daughter/POA Elizabeth Dominguez [phone], with 2nd RN SERGIO Robles as witness. MD arrived 0630. Notified MD that pt is only alert to self and that daughter was unaware of plans until now. MD immediately called daughter to discuss plan for surgery. Also notified MD that pt's WBC is elevated and UTI appears to be present according to labs.    **update: pt's daughter Elizabeth and son arrived to preop and were able to visit with pt until she left for surgery. Belongings were given to daughter Elizabeth.    without any problems. She increase her distance using RW with CGA and no LOB. She will sit up for a while and then come to group. This section established at most recent assessment   PROBLEM LIST (Impairments causing functional limitations):  1. Decreased Strength  2. Decreased ADL/Functional Activities  3. Decreased Transfer Abilities  4. Decreased Ambulation Ability/Technique  5. Increased Pain  6. Decreased Flexibility/Joint Mobility  7. Edema/Girth  8. Decreased Knowledge of Precautions  9. Decreased Ackerman with Home Exercise Program   INTERVENTIONS PLANNED: (Benefits and precautions of physical therapy have been discussed with the patient.)  1. Bed Mobility  2. Cold  3. Gait Training  4. Home Exercise Program (HEP)  5. Therapeutic Exercise/Strengthening  6. Transfer Training  7. Range of Motion: active/assisted/passive  8. Therapeutic Activities  9. Group Therapy     TREATMENT PLAN: Frequency/Duration: Follow patient BID for duration of hospital stay to address above goals. Rehabilitation Potential For Stated Goals: Good     RECOMMENDED REHABILITATION/EQUIPMENT: (at time of discharge pending progress): Continue Skilled Therapy and Home Health: Physical Therapy. HISTORY:   History of Present Injury/Illness (Reason for Referral):  Pt s/p left ERNIE on 4//  Past Medical History/Comorbidities:   Ms. Aminah Sommers  has a past medical history of Anxiety state, unspecified (06/24/2011); Chronic rhinitis (10/01/12); Disorder of bone and cartilage, unspecified (06/24/13); Dysfunction of eustachian tube (06/09/10); GERD (gastroesophageal reflux disease); HLD (hyperlipidemia); MR (mitral regurgitation); Other synovitis and tenosynovitis (06/23/10); Peripheral vascular disease, unspecified ([facility] [address] (06/24/2011); Seasonal allergic rhinitis; and Unspecified tinnitus (06/13/2012). She also has no past medical history of Difficult intubation; Malignant hyperthermia due to anesthesia;  Nausea & vomiting; Pseudocholinesterase deficiency; or Unspecified adverse effect of anesthesia. Ms. Sheree Tanner  has a past surgical history that includes hx colonoscopy (2012); hx tonsillectomy (childhood); hx hysterectomy (); and hx hip replacement (Right, 2016). Social History/Living Environment:   Home Environment: Private residence  # Steps to Enter: 3  Rails to Enter: No  One/Two Story Residence: One story  Living Alone: Yes  Support Systems: Child(karly)  Patient Expects to be Discharged to[de-identified] Private residence  Current DME Used/Available at Home: [employer], straight, Commode, bedside, Crutches, Shower chair, Walker, rolling  Tub or Shower Type: Tub/Shower combination  Prior Level of Function/Work/Activity:  Pt living at home, independent with gait and ADLs without assistive devices   Number of Personal Factors/Comorbidities that affect the Plan of Care: 0: LOW COMPLEXITY   EXAMINATION:   Most Recent Physical Functioning:                            Bed Mobility  Supine to Sit: Stand-by assistance  Sit to Supine:  (left up in chair)    Transfers  Sit to Stand: Contact guard assistance  Stand to Sit: Contact guard assistance                   Weight Bearing Status  Left Side Weight Bearing: As tolerated  Distance (ft): 40 Feet (ft)  Ambulation - Level of Assistance: Contact guard assistance  Speed/[place]: Pace decreased (<100 feet/min)  Step Length: Right shortened  Stance: Left decreased  Gait Abnormalities: Antalgic        Braces/Orthotics: none    Left Hip Cold  Type: Cold/ice pack      Body Structures Involved:  1. Bones  2. Joints  3. Muscles Body Functions Affected:  1. Movement Related Activities and Participation Affected:  1. Mobility  2.  Self Care   Number of elements that affect the Plan of Care: 4+: HIGH COMPLEXITY   CLINICAL PRESENTATION:   Presentation: Stable and uncomplicated: LOW COMPLEXITY   CLINICAL DECISION MAKIN [facility] [address] [address] AM-PAC 6 Clicks   Basic Mobility Inpatient Short Form  How much difficulty does the patient currently have. .. Unable A Lot A Little None   1. Turning over in bed (including adjusting bedclothes, sheets and blankets)? [] 1   [] 2   [x] 3   [] 4   2. Sitting down on and standing up from a chair with arms ( e.g., wheelchair, bedside commode, etc.)   [] 1   [] 2   [x] 3   [] 4   3. Moving from lying on back to sitting on the side of the bed? [] 1   [] 2   [x] 3   [] 4   How much help from another person does the patient currently need. .. Total A Lot A Little None   4. Moving to and from a bed to a chair (including a wheelchair)? [] 1   [] 2   [x] 3   [] 4   5. Need to walk in hospital room? [] 1   [] 2   [x] 3   [] 4   6. Climbing 3-5 steps with a railing? [] 1   [] 2   [x] 3   [] 4   © 2007, Trustees of [address] [place] [address], under license to [employer]. All rights reserved        Score:  Initial: 18 Most Recent: X (Date: -- )    Interpretation of Tool:  Represents activities that are increasingly more difficult (i.e. Bed mobility, Transfers, Gait). Score 24 23 22-20 19-15 14-10 9-7 6     Modifier CH CI CJ CK CL CM CN      ? Mobility - Walking and Moving Around:     - CURRENT STATUS: CK - 40%-59% impaired, limited or restricted    - GOAL STATUS: CI - 1%-19% impaired, limited or restricted    - D/C STATUS:  ---------------To be determined---------------  [place] / [place] / Product Type: Medicare /      Medical Necessity:     · Patient is expected to demonstrate progress in strength, range of motion and functional technique to decrease assistance required with functional mobility and ERNIE exercises. Reason for Services/Other Comments:  · Patient continues to require skilled intervention due to inability to complete functional mobility and ERNIE exercises independently.    Use of outcome tool(s) and clinical judgement create a POC that gives a: Clear prediction of patient's progress: LOW COMPLEXITY            TREATMENT: (In addition to Assessment/Re-Assessment sessions the following treatments were rendered)     Pre-treatment Symptoms/Complaints:  none  Pain Initial:   Pain Intensity 1: 0 (0/10 after therapy)  Post Session:       Gait Training (15 Minutes):  Gait training to improve and/or restore physical functioning as related to mobility. Ambulated 40 Feet (ft) with Contact guard assistance using a   and minimal   related to their hip position and motion to promote proper body alignment. Therapeutic Exercise: (15 Minutes):  Exercises per grid below to improve strength. Required minimal verbal cues to promote proper body alignment. Progressed range as indicated. Date:  4/6 Date:   Date:     ACTIVITY/EXERCISE AM PM AM PM AM PM   GROUP THERAPY  []  []  []  []  []  []   Ankle Pumps 15        Quad Sets 15        Gluteal Sets 15        Hip ABd/ADduction 15        Straight Leg Raises         Knee Slides 15        Short Arc Quads         Long Arc Quads         Chair Slides                  B = bilateral; AA = active assistive; A = active; P = passive      Treatment/Session Assessment:     Response to Treatment:  Tolerated session well. Education:  [] Home Exercises  [x] Fall Precautions  [] Hip Precautions [] D/C Instruction Review  [] Knee/Hip Prosthesis Review  [] Walker Management/Safety [] Adaptive Equipment as Needed       Interdisciplinary Collaboration:   o Registered Nurse    After treatment position/precautions:   o Up in chair  o Bed/Chair-wheels locked  o Bed in low position  o Call light within reach  o Family at bedside    Compliance with Program/Exercises: compliant all of the time. Recommendations/Intent for next treatment session:  Treatment next visit will focus on increasing Ms. Hines's independence with bed mobility, transfers, gait training, strength/ROM exercises, modalities for pain, and patient education.       Total Treatment Duration:  PT Patient Time In/Time Out  Time In: 0700  Time Out: [address] [facility] [address] [place], PTA

## 2022-09-24 PROBLEM — M85.89 OSTEOPENIA OF MULTIPLE SITES: Status: ACTIVE | Noted: 2022-09-24

## 2022-09-24 ASSESSMENT — ENCOUNTER SYMPTOMS
ABDOMINAL PAIN: 0
DIARRHEA: 0
CHEST TIGHTNESS: 0
TROUBLE SWALLOWING: 0
VOICE CHANGE: 0
SORE THROAT: 0
SHORTNESS OF BREATH: 0
NAUSEA: 0
WHEEZING: 0
VOMITING: 0
CONSTIPATION: 0

## 2022-09-24 NOTE — ASSESSMENT & PLAN NOTE
Monitored by specialist- no acute findings meriting change in the plan   On methimazole. Denies sore throat. WBC 4.1.

## 2022-09-26 ENCOUNTER — HOSPITAL ENCOUNTER (OUTPATIENT)
Dept: MAMMOGRAPHY | Age: 72
Discharge: HOME | End: 2022-09-29
Payer: MEDICARE

## 2022-09-26 DIAGNOSIS — M85.89 OSTEOPENIA OF MULTIPLE SITES: ICD-10-CM

## 2022-09-26 PROCEDURE — 77080 DXA BONE DENSITY AXIAL: CPT

## 2022-09-28 ENCOUNTER — TELEPHONE (OUTPATIENT)
Dept: INTERNAL MEDICINE CLINIC | Facility: CLINIC | Age: 72
End: 2022-09-28

## 2022-09-28 NOTE — TELEPHONE ENCOUNTER
----- Message from BAUTISTA Contreras NP sent at 9/28/2022 12:10 PM EDT -----  Bone density has decreased slightly since her previous DEXA scan. Are you still getting the Prolia injections?

## 2022-12-07 ENCOUNTER — OFFICE VISIT (OUTPATIENT)
Dept: ORTHOPEDIC SURGERY | Age: 72
End: 2022-12-07
Payer: MEDICARE

## 2022-12-07 VITALS — HEIGHT: 66 IN | WEIGHT: 170 LBS | BODY MASS INDEX: 27.32 KG/M2

## 2022-12-07 DIAGNOSIS — M70.62 TROCHANTERIC BURSITIS OF LEFT HIP: ICD-10-CM

## 2022-12-07 DIAGNOSIS — M25.559 HIP PAIN: ICD-10-CM

## 2022-12-07 DIAGNOSIS — M70.61 TROCHANTERIC BURSITIS OF RIGHT HIP: ICD-10-CM

## 2022-12-07 DIAGNOSIS — Z96.643 STATUS POST TOTAL HIP REPLACEMENT, BILATERAL: Primary | ICD-10-CM

## 2022-12-07 PROCEDURE — 3017F COLORECTAL CA SCREEN DOC REV: CPT | Performed by: PHYSICIAN ASSISTANT

## 2022-12-07 PROCEDURE — G8484 FLU IMMUNIZE NO ADMIN: HCPCS | Performed by: PHYSICIAN ASSISTANT

## 2022-12-07 PROCEDURE — 1036F TOBACCO NON-USER: CPT | Performed by: PHYSICIAN ASSISTANT

## 2022-12-07 PROCEDURE — G8427 DOCREV CUR MEDS BY ELIG CLIN: HCPCS | Performed by: PHYSICIAN ASSISTANT

## 2022-12-07 PROCEDURE — G8399 PT W/DXA RESULTS DOCUMENT: HCPCS | Performed by: PHYSICIAN ASSISTANT

## 2022-12-07 PROCEDURE — 99214 OFFICE O/P EST MOD 30 MIN: CPT | Performed by: PHYSICIAN ASSISTANT

## 2022-12-07 PROCEDURE — G8417 CALC BMI ABV UP PARAM F/U: HCPCS | Performed by: PHYSICIAN ASSISTANT

## 2022-12-07 PROCEDURE — 1090F PRES/ABSN URINE INCON ASSESS: CPT | Performed by: PHYSICIAN ASSISTANT

## 2022-12-07 PROCEDURE — 1123F ACP DISCUSS/DSCN MKR DOCD: CPT | Performed by: PHYSICIAN ASSISTANT

## 2022-12-07 RX ORDER — PREDNISONE 10 MG/1
10 TABLET ORAL SEE ADMIN INSTRUCTIONS
Qty: 48 EACH | Refills: 0 | Status: SHIPPED | OUTPATIENT
Start: 2022-12-07 | End: 2022-12-19

## 2022-12-07 NOTE — PROGRESS NOTES
Name: Omar Person  YOB: 1950  Gender: female  MRN: [id number]    CC:   Chief Complaint   Patient presents with    Hip Pain     Bilateral hip pain         HPI: Omar Person is a 74 y.o. female here for evaluation of bilateral hip pain status post right NANNETTE in 2016 and left NANNETTE in 2018 by Dr. Josue Asher. There was not an acute injury, though patient does admit to increased activity in her garden over the last month or  Regarding the hip pain, it has been present for 3 to 4 weeks and is becoming worse. The pain is primarily lateral, over greater trochanter  she describes the pain as dull, aching, and throbbing  The pain is worse with rising after sitting, at night, often waking them from sleep, with the first few steps of walking and then improves, and lying on the left side  The pain does not radiate down the leg, though she is having some mild groin pain the left hip  she denies numbness and tingling down the leg. Denies any fever, chills, nausea, vomiting, redness, warmth, or drainage. Treatment so far has been activity modification, Tylenol, and aleve  with little relief. Review of Systems  As per HPI. Pertinent positives and negatives are addressed with the patient, particularly those related to musculoskeletal concerns. Non-orthopaedic concerns were referred back to the primary care physician. Allergies   Allergen Reactions    Sulfa Antibiotics Hives and Rash                    PHYSICAL EXAMINATION:   The patient is alert and oriented, in no distress. There were no vitals filed for this visit. The cervical, thoracic and lumbar spine are without compromising deformity. The upper extremities demonstrate reasonable tone, strength and function bilaterally. The lower extremities are as described below. Circulation is normal with palpable pedal pulses bilaterally and no edema. Skin of the leg is normal with no chronic stasis disease bilaterally.   Sensation is intact to light touch bilaterally. The gait is noted to be with a slight trendelenburg and antalgia  There is no tenderness to palpation along the spinous processes and paraspinal musculature. There mild tenderness to palpation over the right greater trochanter and moderate tenderness over the left  Painless ROM of bilateral hips  Limb lengths are equal.  Straight leg test is Negative bilaterally  Stability is normal bilaterally. Their judgment and insight are normal.  They are oriented to time, place and person. Their memory is good and the mood and affect appropriate. XRAYS:   AP pelvis and lateral views of the bilateral hips are reviewed. There is  good bone prosthetic interface with  no suggestion of a progressive lucency. X-ray impression:  Stable post-operative bilateral  total hip. MRI of the left hip from 5/24/21 reveals:   1. Bilateral total hip arthroplasties. No evidence of periprosthetic osteolysis or periarticular fluid collection. No acute fracture. 2.  Mild bilateral hamstring insertional tendinosis. 3. Chronic moderate atrophy of the gluteus minimus tendons bilaterally with suspected chronic partial tearing. Mild greater trochanteric bursitis bilaterally. IMPRESSION:      ICD-10-CM    1. Status post total hip replacement, bilateral  Z96.643 XR HIP BILATERAL W AP PELVIS (2 VIEWS)      2. Hip pain  M25.559 XR HIP BILATERAL W AP PELVIS (2 VIEWS)      3. Trochanteric bursitis of left hip  M70.62       4. Trochanteric bursitis of right hip  M70.61             RECOMMENDATION:    X-rays were reviewed with the patient today. This patient's clinical history and physical exam is consistent with trochanteric bursitis. We discussed that bursa is a small fluid-filled sac that helps the tissues around a joint slide over one another easily. Injury or overuse of a joint can cause pain, redness, and inflammation in the bursa (bursitis).  Bursitis usually improves with avoiding the activity that caused it, NSAIDs, ice, and physical therapy. In some cases, a steroid injection into the bursa may be beneficial. Future episodes of bursitis can be prevented with stretching and strengthening exercises. ----The patient will be treated observantly with self directed symptomatic care. Instructions were given to follow up if there is any neurologic or functional decline.  ---- A home exercise program was prescribed for stretching and strengthening. A list of exercises was provided. ---- Topical NSAID: The patient is agreeable to a trial of topical nonsteroidal anti-inflammatory drugs (NSAIDs). The patient was prescribed Diclofenac topical.  ---- Oral Steroids: A short course of oral steroids was prescribed in an attempt to bring the acute inflammatory symptoms under control. The patient understands the risks and side effects of oral steroids including immunosuppression, hypertension, mood swings, increased blood sugar, including glaucoma. The steroid taper can be followed by NSAIDs once completed. I did discuss formal PT with the patient but she would like to try home exercises first. If she decides to try outpatient PT, she will call back for us to order this. Follow up with Dr. Solange Thakur as needed.       4--this is a chronic illness/condition with exacerbation    DARIO Recio

## 2022-12-07 NOTE — PATIENT INSTRUCTIONS
Patient Education        Hip Bursitis: Care Instructions  Overview     Bursitis is inflammation of the bursa. A bursa is a small sac of fluid that cushions a joint and helps it move easily. A bursa sits between a bone in the hip and the muscles and tendons in the thigh and buttock. Injury or overuse of the hip can cause bursitis. Activities that can lead to bursitis include twisting and rapid joint movement. Bursitis can cause hip pain. Bursitis usually gets better if you avoid the activity that caused it. If pain lasts or gets worse despite home treatment, your doctor may draw fluid from the bursa through a needle. This may relieve your pain and help your doctor know if you have an infection. If so, your doctor will prescribe antibiotics. If you have inflammation only, you may get a corticosteroid shot to reduce swelling and pain. Sometimes surgery is needed to drain or remove the bursa. Follow-up care is a key part of your treatment and safety. Be sure to make and go to all appointments, and call your doctor if you are having problems. It's also a good idea to know your test results and keep a list of the medicines you take. How can you care for yourself at home? Put ice or a cold pack on your hip for 10 to 20 minutes at a time. Put a thin cloth between the ice and your skin. After 3 days of using ice, you may use heat on your hip. You can use a hot water bottle, a heating pad set on low, or a warm, moist towel. Rest your hip. Stop any activities that cause pain. Switch to activities that do not stress your hip. Take your medicines exactly as prescribed. Call your doctor if you think you are having a problem with your medicine. Ask your doctor if you can take an over-the-counter pain medicine, such as acetaminophen (Tylenol), ibuprofen (Advil, Motrin), or naproxen (Aleve). Be safe with medicines. Read and follow all instructions on the label.   To prevent stiffness, gently move the hip joint as much as you can without pain every day. As the pain gets better, keep doing range-of-motion exercises. Ask your doctor for exercises that will make the muscles around the hip joint stronger. Do these as directed. You can slowly return to the activity that caused the pain, but do it with less effort until you can do it without pain or swelling. Be sure to warm up before and stretch after you do the activity. When should you call for help? Call your doctor now or seek immediate medical care if:    You have a fever. You have increased swelling or redness in your hip. You cannot use your hip, or the pain in your hip gets worse. Watch closely for changes in your health, and be sure to contact your doctor if:    You have pain for 2 weeks or longer despite home treatment. Where can you learn more? Go to [email or web address] org and sign in to your [employer] account. Enter P078 in the "[employer] box to learn more about \"Hip Bursitis: Care Instructions. \"     If you do not have an account, please click on the \"Sign Up Now\" link. Current as of: March 9, 2022               Content Version: 13.4  © 2532-2969 [employer]. Care instructions adapted under license by [facility] ([facility]). If you have questions about a medical condition or this instruction, always ask your healthcare professional. [address] any warranty or liability for your use of this information. Patient Education        Hip Bursitis: Exercises  Introduction  Here are some examples of exercises for you to try. The exercises may be suggested for a condition or for rehabilitation. Start each exercise slowly. Ease off the exercises if you start to have pain. You will be told when to start these exercises and which ones will work best for you. How to do the exercises  Hip rotator stretch  Lie on your back with both knees bent and your feet flat on the floor.   Put the ankle of your affected leg on your opposite thigh near your knee. Use your hand to gently push your knee away from your body until you feel a gentle stretch around your hip. Hold the stretch for 15 to 30 seconds. Repeat 2 to 4 times. Repeat steps 1 through 5, but this time use your hand to gently pull your knee toward your opposite shoulder. Iliotibial band stretch  Lean sideways against a wall. If you are not steady on your feet, hold on to a chair or counter. Stand on the leg with the affected hip, with that leg close to the wall. Then cross your other leg in front of it. Let your affected hip drop out to the side of your body and against wall. Then lean away from your affected hip until you feel a stretch. Hold the stretch for 15 to 30 seconds. Repeat 2 to 4 times. Straight-leg raises to the outside  Lie on your side, with your affected hip on top. Tighten the front thigh muscles of your top leg to keep your knee straight. Keep your hip and your leg straight in line with the rest of your body, and keep your knee pointing forward. Do not drop your hip back. Lift your top leg straight up toward the ceiling, about 12 inches off the floor. Hold for about 6 seconds, then slowly lower your leg. Repeat 8 to 12 times. Clamshell  Lie on your side, with your affected hip on top and your head propped on a pillow. Keep your feet and knees together and your knees bent. Raise your top knee, but keep your feet together. Do not let your hips roll back. Your legs should open up like a clamshell. Hold for 6 seconds. Slowly lower your knee back down. Rest for 10 seconds. Repeat 8 to 12 times. Follow-up care is a key part of your treatment and safety. Be sure to make and go to all appointments, and call your doctor if you are having problems. It's also a good idea to know your test results and keep a list of the medicines you take. Current as of: March 9, 2022               Content Version: 13.4  © 9701-8013 [employer]. Care instructions adapted under license by [facility] ([facility]). If you have questions about a medical condition or this instruction, always ask your healthcare professional. [address] any warranty or liability for your use of this information.

## 2022-12-12 ENCOUNTER — HOSPITAL ENCOUNTER (OUTPATIENT)
Dept: MAMMOGRAPHY | Age: 72
Discharge: HOME | End: 2022-12-15
Payer: MEDICARE

## 2022-12-12 DIAGNOSIS — Z12.31 ENCOUNTER FOR SCREENING MAMMOGRAM FOR BREAST CANCER: ICD-10-CM

## 2022-12-12 DIAGNOSIS — Z12.31 VISIT FOR SCREENING MAMMOGRAM: ICD-10-CM

## 2022-12-12 PROCEDURE — 77067 SCR MAMMO BI INCL CAD: CPT

## 2022-12-14 ENCOUNTER — OFFICE VISIT (OUTPATIENT)
Dept: CARDIOLOGY CLINIC | Age: 72
End: 2022-12-14
Payer: MEDICARE

## 2022-12-14 VITALS
DIASTOLIC BLOOD PRESSURE: 84 MMHG | HEART RATE: 68 BPM | BODY MASS INDEX: 27.64 KG/M2 | WEIGHT: 172 LBS | SYSTOLIC BLOOD PRESSURE: 152 MMHG | HEIGHT: 66 IN

## 2022-12-14 DIAGNOSIS — E78.00 PURE HYPERCHOLESTEROLEMIA: Primary | Chronic | ICD-10-CM

## 2022-12-14 DIAGNOSIS — R00.2 PALPITATIONS: ICD-10-CM

## 2022-12-14 DIAGNOSIS — R07.2 PRECORDIAL PAIN: ICD-10-CM

## 2022-12-14 PROCEDURE — 1123F ACP DISCUSS/DSCN MKR DOCD: CPT | Performed by: INTERNAL MEDICINE

## 2022-12-14 PROCEDURE — G8484 FLU IMMUNIZE NO ADMIN: HCPCS | Performed by: INTERNAL MEDICINE

## 2022-12-14 PROCEDURE — 99213 OFFICE O/P EST LOW 20 MIN: CPT | Performed by: INTERNAL MEDICINE

## 2022-12-14 PROCEDURE — G8428 CUR MEDS NOT DOCUMENT: HCPCS | Performed by: INTERNAL MEDICINE

## 2022-12-14 PROCEDURE — 3017F COLORECTAL CA SCREEN DOC REV: CPT | Performed by: INTERNAL MEDICINE

## 2022-12-14 PROCEDURE — 1090F PRES/ABSN URINE INCON ASSESS: CPT | Performed by: INTERNAL MEDICINE

## 2022-12-14 PROCEDURE — G8399 PT W/DXA RESULTS DOCUMENT: HCPCS | Performed by: INTERNAL MEDICINE

## 2022-12-14 PROCEDURE — 1036F TOBACCO NON-USER: CPT | Performed by: INTERNAL MEDICINE

## 2022-12-14 PROCEDURE — G8417 CALC BMI ABV UP PARAM F/U: HCPCS | Performed by: INTERNAL MEDICINE

## 2022-12-14 RX ORDER — OMEPRAZOLE 20 MG/1
20 TABLET, DELAYED RELEASE ORAL EVERY OTHER DAY
COMMUNITY

## 2022-12-14 NOTE — PROGRESS NOTES
[address], [facility] [address]  PHONE: [phone]      22    NAME:  Tabitha Baca  : 1950  MRN: [id number]       SUBJECTIVE:   Tabitha Baca is a 74 y.o. female seen for a follow up visit regarding the following:     No chief complaint on file. HPI:    No cp or davis. No orthopnea or pnd. No palpitations or syncope. Past Medical History, Past Surgical History, Family history, Social History, and Medications were all reviewed with the patient today and updated as necessary. Current Outpatient Medications   Medication Sig Dispense Refill    omeprazole (PRILOSEC OTC) 20 MG tablet Take 20 mg by mouth every other day      diclofenac sodium (VOLTAREN) 1 % GEL Apply 4 g topically 4 times daily as needed for Pain 150 g 2    predniSONE 10 MG (48) TBPK Take 10 mg by mouth See Admin Instructions for 12 days 48 each 0    fluticasone (FLONASE) 50 MCG/ACT nasal spray 2 sprays by Nasal route daily 3 each 3    methIMAzole (TAPAZOLE) 5 MG tablet Take 0.5 tablets by mouth in the morning. 15 tablet 11    Omega-3 Fatty Acids (FISH OIL PO) Take by mouth      Cetirizine HCl (ZYRTEC PO) Take by mouth      Multiple Vitamin (DAILY-VITAMIN PO) Take by mouth      Calcium Carbonate-Vitamin D (CALCIUM-VITAMIN D) 600-125 MG-UNIT TABS Take 1 tablet by mouth 2 times daily      psyllium 0.52 g capsule Take 1 capsule by mouth Daily with lunch       No current facility-administered medications for this visit. Social History     Tobacco Use    Smoking status: Never    Smokeless tobacco: Never   Substance Use Topics    Alcohol use: Yes     Alcohol/week: 2.0 standard drinks              PHYSICAL EXAM:   BP (!) 152/84   Pulse 68   Ht 5' 5.5\" (1.664 m)   Wt 172 lb (78 kg)   BMI 28.19 kg/m²    Constitutional: Oriented to person, place, and time. Appears well-developed and well-nourished. Head: Normocephalic and atraumatic. Neck: Neck supple. Cardiovascular: Normal rate and regular rhythm with no murmur -No JVP  Pulmonary/Chest: Breath sounds normal.   Abdominal: Soft. Musculoskeletal: No edema. Neurological: Alert and oriented to person, place, and time. Skin: Skin is warm and dry. Psychiatric: Normal mood and affect. Vitals reviewed. Wt Readings from Last 3 Encounters:   12/14/22 172 lb (78 kg)   12/07/22 170 lb (77.1 kg)   08/23/22 175 lb 3.2 oz (79.5 kg)       Medical problems and test results were reviewed with the patient today. ASSESSMENT and PLAN    1. Pure hypercholesterolemia  Resolved. Continue current meds      2. Palpitations  Resolved. Continue current meds      3. Precordial pain  Resolved. Continue current meds  Normal stress test       Return if symptoms worsen or fail to improve.          Delvin Kerr MD  12/14/2022  11:00 AM

## 2022-12-27 ENCOUNTER — TELEMEDICINE (OUTPATIENT)
Dept: INTERNAL MEDICINE CLINIC | Facility: CLINIC | Age: 72
End: 2022-12-27
Payer: MEDICARE

## 2022-12-27 DIAGNOSIS — R05.9 COUGH, UNSPECIFIED TYPE: Primary | ICD-10-CM

## 2022-12-27 PROCEDURE — 99213 OFFICE O/P EST LOW 20 MIN: CPT | Performed by: PHYSICIAN ASSISTANT

## 2022-12-27 PROCEDURE — G8427 DOCREV CUR MEDS BY ELIG CLIN: HCPCS | Performed by: PHYSICIAN ASSISTANT

## 2022-12-27 PROCEDURE — G8399 PT W/DXA RESULTS DOCUMENT: HCPCS | Performed by: PHYSICIAN ASSISTANT

## 2022-12-27 PROCEDURE — 1123F ACP DISCUSS/DSCN MKR DOCD: CPT | Performed by: PHYSICIAN ASSISTANT

## 2022-12-27 PROCEDURE — 1090F PRES/ABSN URINE INCON ASSESS: CPT | Performed by: PHYSICIAN ASSISTANT

## 2022-12-27 PROCEDURE — 3017F COLORECTAL CA SCREEN DOC REV: CPT | Performed by: PHYSICIAN ASSISTANT

## 2022-12-27 RX ORDER — AMOXICILLIN AND CLAVULANATE POTASSIUM 875; 125 MG/1; MG/1
1 TABLET, FILM COATED ORAL 2 TIMES DAILY
Qty: 14 TABLET | Refills: 0 | Status: SHIPPED | OUTPATIENT
Start: 2022-12-27 | End: 2023-01-03

## 2022-12-27 RX ORDER — BENZONATATE 200 MG/1
200 CAPSULE ORAL 3 TIMES DAILY PRN
Qty: 30 CAPSULE | Refills: 0 | Status: SHIPPED | OUTPATIENT
Start: 2022-12-27 | End: 2023-01-03

## 2022-12-27 ASSESSMENT — ENCOUNTER SYMPTOMS
RHINORRHEA: 1
DIARRHEA: 0
WHEEZING: 0
ABDOMINAL PAIN: 0
VOMITING: 0
SINUS PRESSURE: 1
CONSTIPATION: 0
COUGH: 1
CHEST TIGHTNESS: 0
COLOR CHANGE: 0
NAUSEA: 0
VOICE CHANGE: 0
SORE THROAT: 1
SHORTNESS OF BREATH: 1
EYE PAIN: 0

## 2022-12-27 NOTE — PROGRESS NOTES
PROGRESS NOTE    Philip Stewart is a 74 y.o. female seen for a follow up visit regarding   Chief Complaint   Patient presents with    Cough    Congestion        HPI  Cough  This is a new problem. The current episode started in the past 7 days. The problem has been gradually worsening. The problem occurs constantly. The cough is Non-productive. Associated symptoms include nasal congestion, rhinorrhea, a sore throat and shortness of breath. Pertinent negatives include no chest pain, ear pain, fever, headaches, rash or wheezing. She has tried oral steroids (pt was prednisone 12/7/2022- by ) for the symptoms. Past Medical History, Past Surgical History, Family history, Social History, and Medications were all reviewed with the patient today and updated as necessary. Current Outpatient Medications   Medication Sig Dispense Refill    amoxicillin-clavulanate (AUGMENTIN) 875-125 MG per tablet Take 1 tablet by mouth 2 times daily for 7 days 14 tablet 0    benzonatate (TESSALON) 200 MG capsule Take 1 capsule by mouth 3 times daily as needed for Cough 30 capsule 0    omeprazole (PRILOSEC OTC) 20 MG tablet Take 20 mg by mouth every other day      diclofenac sodium (VOLTAREN) 1 % GEL Apply 4 g topically 4 times daily as needed for Pain 150 g 2    fluticasone (FLONASE) 50 MCG/ACT nasal spray 2 sprays by Nasal route daily 3 each 3    methIMAzole (TAPAZOLE) 5 MG tablet Take 0.5 tablets by mouth in the morning. 15 tablet 11    Omega-3 Fatty Acids (FISH OIL PO) Take by mouth      Cetirizine HCl (ZYRTEC PO) Take by mouth      Multiple Vitamin (DAILY-VITAMIN PO) Take by mouth      Calcium Carbonate-Vitamin D (CALCIUM-VITAMIN D) 600-125 MG-UNIT TABS Take 1 tablet by mouth 2 times daily      psyllium 0.52 g capsule Take 1 capsule by mouth Daily with lunch       No current facility-administered medications for this visit.      Allergies   Allergen Reactions    Sulfa Antibiotics Hives and Rash         ROS  Review of Systems   Constitutional:  Negative for fatigue, fever and unexpected weight change. HENT:  Positive for rhinorrhea, sinus pressure and sore throat. Negative for congestion, ear pain, hearing loss, tinnitus and voice change. Eyes:  Negative for pain and visual disturbance. Respiratory:  Positive for cough and shortness of breath. Negative for chest tightness and wheezing. Cardiovascular:  Negative for chest pain, palpitations and leg swelling. Gastrointestinal:  Negative for abdominal pain, constipation, diarrhea, nausea and vomiting. Genitourinary:  Negative for dysuria, frequency, hematuria and urgency. Musculoskeletal:  Negative for arthralgias, gait problem, joint swelling and neck pain. Skin:  Negative for color change and rash. Neurological:  Negative for dizziness, syncope, numbness and headaches. Hematological:  Negative for adenopathy. Psychiatric/Behavioral:  Negative for decreased concentration, hallucinations, sleep disturbance and suicidal ideas. The patient is not nervous/anxious. OBJECTIVE:  There were no vitals taken for this visit. PHYSICAL EXAM  Physical Exam  Constitutional:       Appearance: Normal appearance. HENT:      Head: Normocephalic and atraumatic. Nose: Nose normal.   Eyes:      Extraocular Movements: Extraocular movements intact. Pulmonary:      Effort: Pulmonary effort is normal.   Musculoskeletal:         General: Normal range of motion. Cervical back: Normal range of motion. Skin:     Findings: No erythema or rash. Neurological:      General: No focal deficit present. Mental Status: She is alert and oriented to person, place, and time. Psychiatric:         Mood and Affect: Mood normal.       Medical problems and test results were reviewed with the patient today. ASSESSMENT and PLAN  Ness Gaviria was seen today for cough and congestion.     Diagnoses and all orders for this visit:    Cough, unspecified type  Comments:  will treat today, if not better NTBS by em in 7-10 days  Orders:  -     benzonatate (TESSALON) 200 MG capsule; Take 1 capsule by mouth 3 times daily as needed for Cough    Other orders  -     amoxicillin-clavulanate (AUGMENTIN) 875-125 MG per tablet; Take 1 tablet by mouth 2 times daily for 7 days        Return if symptoms worsen or fail to improve to be seen by Sugar Gamino. 12/27/2022        I was in office while conducting this encounter. Consent:  She and/or her healthcare decision maker is aware that this patient-initiated Telehealth encounter is a billable service, with coverage as determined by her insurance carrier. She is aware that she may receive a bill and has provided verbal consent to proceed: Yes    This virtual visit was conducted zeyad. me with audio and visual components

## 2023-01-30 DIAGNOSIS — E05.90 HYPERTHYROIDISM: ICD-10-CM

## 2023-01-30 LAB
ALBUMIN SERPL-MCNC: 3.6 G/DL (ref 3.2–4.6)
ALBUMIN/GLOB SERPL: 1.3 (ref 0.4–1.6)
ALP SERPL-CCNC: 102 U/L (ref 50–136)
ALT SERPL-CCNC: 19 U/L (ref 12–65)
AST SERPL-CCNC: 20 U/L (ref 15–37)
BILIRUB DIRECT SERPL-MCNC: 0.2 MG/DL
BILIRUB SERPL-MCNC: 1 MG/DL (ref 0.2–1.1)
GLOBULIN SER CALC-MCNC: 2.7 G/DL (ref 2.8–4.5)
PROT SERPL-MCNC: 6.3 G/DL (ref 6.3–8.2)
T3 SERPL-MCNC: 0.89 NG/ML (ref 0.6–1.81)
T4 FREE SERPL-MCNC: 0.9 NG/DL (ref 0.78–1.46)
TSH, 3RD GENERATION: 3.65 UIU/ML (ref 0.36–3.74)

## 2023-01-31 ENCOUNTER — OFFICE VISIT (OUTPATIENT)
Dept: ENDOCRINOLOGY | Age: 73
End: 2023-01-31
Payer: MEDICARE

## 2023-01-31 VITALS
HEART RATE: 93 BPM | TEMPERATURE: 97.2 F | DIASTOLIC BLOOD PRESSURE: 80 MMHG | WEIGHT: 173 LBS | SYSTOLIC BLOOD PRESSURE: 120 MMHG | HEIGHT: 66 IN | RESPIRATION RATE: 16 BRPM | BODY MASS INDEX: 27.8 KG/M2 | OXYGEN SATURATION: 94 %

## 2023-01-31 DIAGNOSIS — E05.90 HYPERTHYROIDISM: Primary | ICD-10-CM

## 2023-01-31 DIAGNOSIS — E04.1 THYROID NODULE: ICD-10-CM

## 2023-01-31 PROCEDURE — G8484 FLU IMMUNIZE NO ADMIN: HCPCS | Performed by: INTERNAL MEDICINE

## 2023-01-31 PROCEDURE — 3017F COLORECTAL CA SCREEN DOC REV: CPT | Performed by: INTERNAL MEDICINE

## 2023-01-31 PROCEDURE — 1036F TOBACCO NON-USER: CPT | Performed by: INTERNAL MEDICINE

## 2023-01-31 PROCEDURE — 1090F PRES/ABSN URINE INCON ASSESS: CPT | Performed by: INTERNAL MEDICINE

## 2023-01-31 PROCEDURE — 99213 OFFICE O/P EST LOW 20 MIN: CPT | Performed by: INTERNAL MEDICINE

## 2023-01-31 PROCEDURE — G8399 PT W/DXA RESULTS DOCUMENT: HCPCS | Performed by: INTERNAL MEDICINE

## 2023-01-31 PROCEDURE — 1123F ACP DISCUSS/DSCN MKR DOCD: CPT | Performed by: INTERNAL MEDICINE

## 2023-01-31 PROCEDURE — G8417 CALC BMI ABV UP PARAM F/U: HCPCS | Performed by: INTERNAL MEDICINE

## 2023-01-31 PROCEDURE — G8427 DOCREV CUR MEDS BY ELIG CLIN: HCPCS | Performed by: INTERNAL MEDICINE

## 2023-01-31 RX ORDER — METHIMAZOLE 5 MG/1
2.5 TABLET ORAL
Qty: 7 TABLET | Refills: 11
Start: 2023-02-01

## 2023-01-31 ASSESSMENT — PATIENT HEALTH QUESTIONNAIRE - PHQ9
2. FEELING DOWN, DEPRESSED OR HOPELESS: 0
SUM OF ALL RESPONSES TO PHQ QUESTIONS 1-9: 0
SUM OF ALL RESPONSES TO PHQ QUESTIONS 1-9: 0
1. LITTLE INTEREST OR PLEASURE IN DOING THINGS: 0
SUM OF ALL RESPONSES TO PHQ QUESTIONS 1-9: 0
SUM OF ALL RESPONSES TO PHQ QUESTIONS 1-9: 0
SUM OF ALL RESPONSES TO PHQ9 QUESTIONS 1 & 2: 0

## 2023-01-31 ASSESSMENT — ENCOUNTER SYMPTOMS
VOICE CHANGE: 0
TROUBLE SWALLOWING: 0
CONSTIPATION: 0
DIARRHEA: 0

## 2023-01-31 NOTE — PROGRESS NOTES
Kayy Fuentes MD, [address]            Reason for visit: Follow-up of hyperthyroidism      ASSESSMENT AND PLAN:    1. Hyperthyroidism  She has responded well to methimazole. I will reduce her dose. She will recheck thyroid function tests in 6 or 8 weeks and then again prior to the next appointment with me. - methIMAzole (TAPAZOLE) 5 MG tablet; Take 0.5 tablets by mouth three times a week  Dispense: 7 tablet; Refill: 11  - TSH; Future  - T4, Free; Future  - T3; Future  - TSH; Future  - T4, Free; Future  - T3; Future    2. Thyroid nodule  Per ACR criteria, ongoing ultrasound follow-up is not necessary. Follow-up and Dispositions    Return in about 4 months (around 5/31/2023). History of Present Illness:    THYROID DYSFUNCTION  Gabe Osorio is seen for follow-up of hyperthyroidism; this was diagnosed in July 2019. Current symptoms: See review of systems below     Prior treatment: Methimazole 5 mg daily was started 10/14/2019. Her dose was decreased to 2.5 mg daily 12/17/2019. Pertinent labs:  6/26/2015: TSH 3.390, free T4 1.30.  7/1/2016: TSH 0.830, free T4 1.30.  7/6/2017: TSH 1.960, free T4 1.17.  7/19/2018: TSH 1.830, free T4 1.24.  7/30/2019: TSH 0.028, free T4 1.71.  8/23/2019: TSH 0.033, free T4 1.60, free T3 2.7.  10/14/2019: TSH 0.090, free T4 1.52, T3 109, thyrotropin receptor antibodies less than 1.10 (-).  12/16/2019: TSH 3.150, free T4 1.04, T3 88.  3/16/2020: TSH 3.410, free T4 1.11, T3 102.  7/20/2020: TSH 2.740, free T4 1.04, T3 97.  1/20/2021: TSH 3.440, free T4 1.20, T3 102.  7/26/2021: TSH 2.250, free T4 1.30, T3 125.  8/11/2021: TSH 1.460.  1/27/2022: TSH 3.220, free T4 1.30, T3 100.  5/13/2022: TSH 2.180.  7/26/2022: TSH 3.540, free T4 1.20, T3 108.  1/30/2023: TSH 3.650, free T4 0.9, T3 0.89.      Imaging:  10/14/2019: Ultrasound ([facility])- Right lobe 2.23 x 1.65 x 3.75 cm, isthmus 0.53 cm, left lobe 1.47 x 1.08 x 2.99 cm. Heterogeneous echotexture. Mildly increased blood flow. 0.53 x 0.81 x 1.03 cm isoechoic nodule with mildly increased peripheral but not central blood flow and no calcifications in the anterior midportion of the right lobe. 7/22/2020: Ultrasound ([facility])- Right lobe 1.57 x 1.68 x 3.58 cm, isthmus 0.33 cm, left lobe 1.59 x 1.23 x 3.35 cm. Heterogeneous echotexture. Normal blood flow. 0.91 x 0.85 x 1.03 cm isoechoic nodule with mildly increased peripheral but not central blood flow and no calcifications in the anterior midportion of the right lobe. 7/28/2021: Ultrasound ([facility])- Right lobe 1.55 x 1.48 x 4.08 cm, isthmus 0.46 cm, left lobe 1.53 x 1.41 x 3.06 cm. Heterogeneous echotexture. Mildly increased blood flow. 0.61 x 0.91 x 1.00 cm isoechoic nodule with mildly increased peripheral but not central blood flow and no calcifications in the anterior midportion of the right lobe. Review of Systems   Constitutional:  Positive for fatigue and unexpected weight change (gained 3 pounds in 7-8 weeks). HENT:  Negative for trouble swallowing and voice change. Cardiovascular:  Positive for palpitations (rare). Gastrointestinal:  Negative for constipation and diarrhea. Psychiatric/Behavioral:  Negative for sleep disturbance. /80 (Site: Right Upper Arm, Position: Sitting, Cuff Size: Small Adult)   Pulse 93   Temp 97.2 °F (36.2 °C) (Temporal)   Resp 16   Ht 5' 5.5\" (1.664 m)   Wt 173 lb (78.5 kg)   SpO2 94%   BMI 28.35 kg/m²   Wt Readings from Last 3 Encounters:   01/31/23 173 lb (78.5 kg)   12/14/22 172 lb (78 kg)   12/07/22 170 lb (77.1 kg)       Physical Exam  HENT:      Head: Normocephalic. Neck:      Thyroid: No thyroid mass or thyromegaly. Cardiovascular:      Rate and Rhythm: Normal rate. Pulmonary:      Effort: No respiratory distress. Breath sounds: Normal breath sounds. Neurological:      Mental Status: She is alert.    Psychiatric:         Mood and Affect: Mood normal.         Behavior: Behavior normal.       Orders Placed This Encounter   Procedures    TSH     Standing Status:   Future     Standing Expiration Date:   1/31/2024    T4, Free     Standing Status:   Future     Standing Expiration Date:   1/31/2024    T3     Standing Status:   Future     Standing Expiration Date:   1/31/2024    TSH     Standing Status:   Future     Standing Expiration Date:   1/31/2024    T4, Free     Standing Status:   Future     Standing Expiration Date:   1/31/2024    T3     Standing Status:   Future     Standing Expiration Date:   1/31/2024           Current Outpatient Medications   Medication Sig Dispense Refill    [START ON 2/1/2023] methIMAzole (TAPAZOLE) 5 MG tablet Take 0.5 tablets by mouth three times a week 7 tablet 11    omeprazole (PRILOSEC OTC) 20 MG tablet Take 20 mg by mouth every other day      fluticasone (FLONASE) 50 MCG/ACT nasal spray 2 sprays by Nasal route daily 3 each 3    Omega-3 Fatty Acids (FISH OIL PO) Take by mouth      Cetirizine HCl (ZYRTEC PO) Take by mouth      Multiple Vitamin (DAILY-VITAMIN PO) Take by mouth      Calcium Carbonate-Vitamin D (CALCIUM-VITAMIN D) 600-125 MG-UNIT TABS Take 1 tablet by mouth 2 times daily      psyllium 0.52 g capsule Take 1 capsule by mouth Daily with lunch       No current facility-administered medications for this visit. Fantasma Meredith MD, FACE      Portions of this note were generated with the assistance of voice recognition software. As such, some errors in transcription may be present.

## 2023-03-22 DIAGNOSIS — E05.90 HYPERTHYROIDISM: ICD-10-CM

## 2023-03-22 LAB
T3 SERPL-MCNC: 1.22 NG/ML (ref 0.6–1.81)
T4 FREE SERPL-MCNC: 1.1 NG/DL (ref 0.78–1.46)
TSH, 3RD GENERATION: 1.55 UIU/ML (ref 0.36–3.74)

## 2023-05-30 DIAGNOSIS — E05.90 HYPERTHYROIDISM: ICD-10-CM

## 2023-05-30 LAB
T3 SERPL-MCNC: 1.07 NG/ML (ref 0.6–1.81)
T4 FREE SERPL-MCNC: 1.1 NG/DL (ref 0.78–1.46)
TSH, 3RD GENERATION: 1.11 UIU/ML (ref 0.36–3.74)

## 2023-05-31 ENCOUNTER — OFFICE VISIT (OUTPATIENT)
Dept: ENDOCRINOLOGY | Age: 73
End: 2023-05-31
Payer: MEDICARE

## 2023-05-31 VITALS
DIASTOLIC BLOOD PRESSURE: 67 MMHG | HEART RATE: 83 BPM | WEIGHT: 171 LBS | BODY MASS INDEX: 28.02 KG/M2 | SYSTOLIC BLOOD PRESSURE: 140 MMHG | OXYGEN SATURATION: 96 %

## 2023-05-31 DIAGNOSIS — E04.1 THYROID NODULE: ICD-10-CM

## 2023-05-31 DIAGNOSIS — E05.90 HYPERTHYROIDISM: Primary | ICD-10-CM

## 2023-05-31 PROCEDURE — G8428 CUR MEDS NOT DOCUMENT: HCPCS | Performed by: INTERNAL MEDICINE

## 2023-05-31 PROCEDURE — 1123F ACP DISCUSS/DSCN MKR DOCD: CPT | Performed by: INTERNAL MEDICINE

## 2023-05-31 PROCEDURE — 3017F COLORECTAL CA SCREEN DOC REV: CPT | Performed by: INTERNAL MEDICINE

## 2023-05-31 PROCEDURE — G8417 CALC BMI ABV UP PARAM F/U: HCPCS | Performed by: INTERNAL MEDICINE

## 2023-05-31 PROCEDURE — 1090F PRES/ABSN URINE INCON ASSESS: CPT | Performed by: INTERNAL MEDICINE

## 2023-05-31 PROCEDURE — 1036F TOBACCO NON-USER: CPT | Performed by: INTERNAL MEDICINE

## 2023-05-31 PROCEDURE — G8399 PT W/DXA RESULTS DOCUMENT: HCPCS | Performed by: INTERNAL MEDICINE

## 2023-05-31 PROCEDURE — 99213 OFFICE O/P EST LOW 20 MIN: CPT | Performed by: INTERNAL MEDICINE

## 2023-05-31 RX ORDER — METHIMAZOLE 5 MG/1
2.5 TABLET ORAL
Qty: 7 TABLET | Refills: 11 | Status: SHIPPED | OUTPATIENT
Start: 2023-05-31

## 2023-05-31 ASSESSMENT — ENCOUNTER SYMPTOMS
DIARRHEA: 0
CONSTIPATION: 0
VOICE CHANGE: 0
TROUBLE SWALLOWING: 0

## 2023-05-31 NOTE — PROGRESS NOTES
Blair Reardon MD, [address]            Reason for visit: Follow-up of hyperthyroidism      ASSESSMENT AND PLAN:    1. Hyperthyroidism  She has responded well to methimazole. I will have her continue low-dose methimazole as currently prescribed. Return in 6 months with labs. - methIMAzole (TAPAZOLE) 5 MG tablet; Take 0.5 tablets by mouth three times a week  Dispense: 7 tablet; Refill: 11  - TSH; Future  - T4, Free; Future  - T3; Future    2. Thyroid nodule  Per ACR criteria, ongoing ultrasound follow-up is not necessary. Follow-up and Dispositions    Return in about 6 months (around 11/30/2023). History of Present Illness:    THYROID DYSFUNCTION  Alesia Kauffman is seen for follow-up of hyperthyroidism; this was diagnosed in July 2019. Current symptoms: See review of systems below     Prior treatment: Methimazole 5 mg daily was started 10/14/2019. Her dose has been adjusted as follows:  -2.5 mg daily 12/17/2019.  -2.5 mg daily 3 days/week 1/31/2023     Pertinent labs:  6/26/2015: TSH 3.390, free T4 1.30.  7/1/2016: TSH 0.830, free T4 1.30.  7/6/2017: TSH 1.960, free T4 1.17.  7/19/2018: TSH 1.830, free T4 1.24.  7/30/2019: TSH 0.028, free T4 1.71.  8/23/2019: TSH 0.033, free T4 1.60, free T3 2.7.  10/14/2019: TSH 0.090, free T4 1.52, T3 109, thyrotropin receptor antibodies less than 1.10 (-).  12/16/2019: TSH 3.150, free T4 1.04, T3 88.  3/16/2020: TSH 3.410, free T4 1.11, T3 102.  7/20/2020: TSH 2.740, free T4 1.04, T3 97.  1/20/2021: TSH 3.440, free T4 1.20, T3 102.  7/26/2021: TSH 2.250, free T4 1.30, T3 125.  8/11/2021: TSH 1.460.  1/27/2022: TSH 3.220, free T4 1.30, T3 100.  5/13/2022: TSH 2.180.  7/26/2022: TSH 3.540, free T4 1.20, T3 108.  1/30/2023: TSH 3.650, free T4 0.9, T3 0.89.  3/22/2023: TSH 1.550, free T4 1.1, T3 1.22.  5/30/2023: TSH 1.110, free T4 1.1, T3 1.07.      Imaging:  10/14/2019: Ultrasound ([facility])- Right

## 2023-08-18 SDOH — ECONOMIC STABILITY: FOOD INSECURITY: WITHIN THE PAST 12 MONTHS, THE FOOD YOU BOUGHT JUST DIDN'T LAST AND YOU DIDN'T HAVE MONEY TO GET MORE.: NEVER TRUE

## 2023-08-18 SDOH — ECONOMIC STABILITY: FOOD INSECURITY: WITHIN THE PAST 12 MONTHS, YOU WORRIED THAT YOUR FOOD WOULD RUN OUT BEFORE YOU GOT MONEY TO BUY MORE.: NEVER TRUE

## 2023-08-18 SDOH — HEALTH STABILITY: PHYSICAL HEALTH: ON AVERAGE, HOW MANY MINUTES DO YOU ENGAGE IN EXERCISE AT THIS LEVEL?: 30 MIN

## 2023-08-18 SDOH — ECONOMIC STABILITY: TRANSPORTATION INSECURITY
IN THE PAST 12 MONTHS, HAS LACK OF TRANSPORTATION KEPT YOU FROM MEETINGS, WORK, OR FROM GETTING THINGS NEEDED FOR DAILY LIVING?: NO

## 2023-08-18 SDOH — ECONOMIC STABILITY: HOUSING INSECURITY
IN THE LAST 12 MONTHS, WAS THERE A TIME WHEN YOU DID NOT HAVE A STEADY PLACE TO SLEEP OR SLEPT IN A SHELTER (INCLUDING NOW)?: NO

## 2023-08-18 SDOH — HEALTH STABILITY: PHYSICAL HEALTH: ON AVERAGE, HOW MANY DAYS PER WEEK DO YOU ENGAGE IN MODERATE TO STRENUOUS EXERCISE (LIKE A BRISK WALK)?: 4 DAYS

## 2023-08-18 SDOH — ECONOMIC STABILITY: INCOME INSECURITY: HOW HARD IS IT FOR YOU TO PAY FOR THE VERY BASICS LIKE FOOD, HOUSING, MEDICAL CARE, AND HEATING?: NOT HARD AT ALL

## 2023-08-18 ASSESSMENT — LIFESTYLE VARIABLES
HOW MANY STANDARD DRINKS CONTAINING ALCOHOL DO YOU HAVE ON A TYPICAL DAY: 1 OR 2
HOW OFTEN DO YOU HAVE A DRINK CONTAINING ALCOHOL: 2-3 TIMES A WEEK
HOW MANY STANDARD DRINKS CONTAINING ALCOHOL DO YOU HAVE ON A TYPICAL DAY: 1
HOW OFTEN DO YOU HAVE SIX OR MORE DRINKS ON ONE OCCASION: 1
HOW OFTEN DO YOU HAVE A DRINK CONTAINING ALCOHOL: 4

## 2023-08-18 ASSESSMENT — PATIENT HEALTH QUESTIONNAIRE - PHQ9
SUM OF ALL RESPONSES TO PHQ QUESTIONS 1-9: 0
SUM OF ALL RESPONSES TO PHQ QUESTIONS 1-9: 0
SUM OF ALL RESPONSES TO PHQ9 QUESTIONS 1 & 2: 0
SUM OF ALL RESPONSES TO PHQ QUESTIONS 1-9: 0
SUM OF ALL RESPONSES TO PHQ QUESTIONS 1-9: 0
1. LITTLE INTEREST OR PLEASURE IN DOING THINGS: 0
2. FEELING DOWN, DEPRESSED OR HOPELESS: 0

## 2023-08-21 ENCOUNTER — OFFICE VISIT (OUTPATIENT)
Dept: INTERNAL MEDICINE CLINIC | Facility: CLINIC | Age: 73
End: 2023-08-21
Payer: MEDICARE

## 2023-08-21 VITALS
BODY MASS INDEX: 27.23 KG/M2 | DIASTOLIC BLOOD PRESSURE: 70 MMHG | HEIGHT: 66 IN | WEIGHT: 169.4 LBS | HEART RATE: 81 BPM | OXYGEN SATURATION: 96 % | SYSTOLIC BLOOD PRESSURE: 128 MMHG

## 2023-08-21 DIAGNOSIS — E78.00 PURE HYPERCHOLESTEROLEMIA: ICD-10-CM

## 2023-08-21 DIAGNOSIS — D64.9 ANEMIA, UNSPECIFIED TYPE: ICD-10-CM

## 2023-08-21 DIAGNOSIS — R00.2 PALPITATIONS: ICD-10-CM

## 2023-08-21 DIAGNOSIS — E05.90 HYPERTHYROIDISM: ICD-10-CM

## 2023-08-21 DIAGNOSIS — H91.93 DECREASED HEARING OF BOTH EARS: ICD-10-CM

## 2023-08-21 DIAGNOSIS — R42 DIZZINESS AND GIDDINESS: ICD-10-CM

## 2023-08-21 DIAGNOSIS — Z00.00 MEDICARE ANNUAL WELLNESS VISIT, SUBSEQUENT: Primary | ICD-10-CM

## 2023-08-21 LAB
ALBUMIN SERPL-MCNC: 3.8 G/DL (ref 3.2–4.6)
ALBUMIN/GLOB SERPL: 1.5 (ref 0.4–1.6)
ALP SERPL-CCNC: 97 U/L (ref 50–136)
ALT SERPL-CCNC: 36 U/L (ref 12–65)
ANION GAP SERPL CALC-SCNC: 4 MMOL/L (ref 2–11)
AST SERPL-CCNC: 21 U/L (ref 15–37)
BASOPHILS # BLD: 0 K/UL (ref 0–0.2)
BASOPHILS NFR BLD: 1 % (ref 0–2)
BILIRUB SERPL-MCNC: 1.4 MG/DL (ref 0.2–1.1)
BUN SERPL-MCNC: 14 MG/DL (ref 8–23)
CALCIUM SERPL-MCNC: 9.9 MG/DL (ref 8.3–10.4)
CHLORIDE SERPL-SCNC: 110 MMOL/L (ref 101–110)
CHOLEST SERPL-MCNC: 158 MG/DL
CO2 SERPL-SCNC: 30 MMOL/L (ref 21–32)
CREAT SERPL-MCNC: 0.8 MG/DL (ref 0.6–1)
DIFFERENTIAL METHOD BLD: ABNORMAL
EOSINOPHIL # BLD: 0.1 K/UL (ref 0–0.8)
EOSINOPHIL NFR BLD: 2 % (ref 0.5–7.8)
ERYTHROCYTE [DISTWIDTH] IN BLOOD BY AUTOMATED COUNT: 11.3 % (ref 11.9–14.6)
GLOBULIN SER CALC-MCNC: 2.6 G/DL (ref 2.8–4.5)
GLUCOSE SERPL-MCNC: 96 MG/DL (ref 65–100)
HCT VFR BLD AUTO: 42.1 % (ref 35.8–46.3)
HDLC SERPL-MCNC: 37 MG/DL (ref 40–60)
HDLC SERPL: 4.3
HGB BLD-MCNC: 13.7 G/DL (ref 11.7–15.4)
IMM GRANULOCYTES # BLD AUTO: 0 K/UL (ref 0–0.5)
IMM GRANULOCYTES NFR BLD AUTO: 0 % (ref 0–5)
LDLC SERPL CALC-MCNC: 87.2 MG/DL
LYMPHOCYTES # BLD: 1.4 K/UL (ref 0.5–4.6)
LYMPHOCYTES NFR BLD: 36 % (ref 13–44)
MCH RBC QN AUTO: 30.4 PG (ref 26.1–32.9)
MCHC RBC AUTO-ENTMCNC: 32.5 G/DL (ref 31.4–35)
MCV RBC AUTO: 93.3 FL (ref 82–102)
MONOCYTES # BLD: 0.4 K/UL (ref 0.1–1.3)
MONOCYTES NFR BLD: 10 % (ref 4–12)
NEUTS SEG # BLD: 2 K/UL (ref 1.7–8.2)
NEUTS SEG NFR BLD: 51 % (ref 43–78)
NRBC # BLD: 0 K/UL (ref 0–0.2)
PLATELET # BLD AUTO: 200 K/UL (ref 150–450)
PMV BLD AUTO: 11.4 FL (ref 9.4–12.3)
POTASSIUM SERPL-SCNC: 4.1 MMOL/L (ref 3.5–5.1)
PROT SERPL-MCNC: 6.4 G/DL (ref 6.3–8.2)
RBC # BLD AUTO: 4.51 M/UL (ref 4.05–5.2)
SODIUM SERPL-SCNC: 144 MMOL/L (ref 133–143)
T4 FREE SERPL-MCNC: 2 NG/DL (ref 0.78–1.46)
TRIGL SERPL-MCNC: 169 MG/DL (ref 35–150)
TSH, 3RD GENERATION: <0.005 UIU/ML (ref 0.36–3.74)
VLDLC SERPL CALC-MCNC: 33.8 MG/DL (ref 6–23)
WBC # BLD AUTO: 3.9 K/UL (ref 4.3–11.1)

## 2023-08-21 PROCEDURE — 1123F ACP DISCUSS/DSCN MKR DOCD: CPT | Performed by: NURSE PRACTITIONER

## 2023-08-21 PROCEDURE — G0439 PPPS, SUBSEQ VISIT: HCPCS | Performed by: NURSE PRACTITIONER

## 2023-08-21 PROCEDURE — 3017F COLORECTAL CA SCREEN DOC REV: CPT | Performed by: NURSE PRACTITIONER

## 2023-08-21 RX ORDER — LANOLIN ALCOHOL/MO/W.PET/CERES
CREAM (GRAM) TOPICAL
COMMUNITY

## 2023-08-21 ASSESSMENT — PATIENT HEALTH QUESTIONNAIRE - PHQ9
SUM OF ALL RESPONSES TO PHQ QUESTIONS 1-9: 0
2. FEELING DOWN, DEPRESSED OR HOPELESS: 0
SUM OF ALL RESPONSES TO PHQ9 QUESTIONS 1 & 2: 0
1. LITTLE INTEREST OR PLEASURE IN DOING THINGS: 0

## 2023-08-21 ASSESSMENT — LIFESTYLE VARIABLES
HOW OFTEN DO YOU HAVE A DRINK CONTAINING ALCOHOL: 2-3 TIMES A WEEK
HOW MANY STANDARD DRINKS CONTAINING ALCOHOL DO YOU HAVE ON A TYPICAL DAY: 1 OR 2

## 2023-08-22 LAB — T3FREE SERPL-MCNC: 7.7 PG/ML (ref 2–4.4)

## 2023-08-23 ENCOUNTER — TELEPHONE (OUTPATIENT)
Dept: ENDOCRINOLOGY | Age: 73
End: 2023-08-23

## 2023-08-23 DIAGNOSIS — E05.90 HYPERTHYROIDISM: ICD-10-CM

## 2023-08-23 RX ORDER — METHIMAZOLE 5 MG/1
2.5 TABLET ORAL DAILY
Qty: 15 TABLET | Refills: 11 | Status: SHIPPED | OUTPATIENT
Start: 2023-08-23

## 2023-08-23 NOTE — TELEPHONE ENCOUNTER
My Chart message to patient:    Acosta Thomas, thank you. Please increase to taking half of a tablet every day. I sent a new prescription to your pharmacy. Please return to the lab here in about 6 weeks and recheck labs. Electronic order was submitted. Thanks.

## 2023-08-23 NOTE — TELEPHONE ENCOUNTER
Pt LVM stating her thyroid levels, checked by Andrew Fu, came back abnormal and she wants to know what she should do.

## 2023-08-25 ASSESSMENT — PATIENT HEALTH QUESTIONNAIRE - PHQ9
1. LITTLE INTEREST OR PLEASURE IN DOING THINGS: 0
2. FEELING DOWN, DEPRESSED OR HOPELESS: 0
SUM OF ALL RESPONSES TO PHQ QUESTIONS 1-9: 0
SUM OF ALL RESPONSES TO PHQ9 QUESTIONS 1 & 2: 0
SUM OF ALL RESPONSES TO PHQ9 QUESTIONS 1 & 2: 0
1. LITTLE INTEREST OR PLEASURE IN DOING THINGS: NOT AT ALL
2. FEELING DOWN, DEPRESSED OR HOPELESS: NOT AT ALL
SUM OF ALL RESPONSES TO PHQ QUESTIONS 1-9: 0

## 2023-08-28 ENCOUNTER — OFFICE VISIT (OUTPATIENT)
Dept: INTERNAL MEDICINE CLINIC | Facility: CLINIC | Age: 73
End: 2023-08-28
Payer: MEDICARE

## 2023-08-28 ENCOUNTER — TELEPHONE (OUTPATIENT)
Dept: INTERNAL MEDICINE CLINIC | Facility: CLINIC | Age: 73
End: 2023-08-28

## 2023-08-28 ENCOUNTER — HOSPITAL ENCOUNTER (OUTPATIENT)
Dept: GENERAL RADIOLOGY | Age: 73
Discharge: HOME | End: 2023-08-31

## 2023-08-28 VITALS
HEART RATE: 91 BPM | HEIGHT: 66 IN | OXYGEN SATURATION: 95 % | WEIGHT: 169.2 LBS | DIASTOLIC BLOOD PRESSURE: 72 MMHG | BODY MASS INDEX: 27.19 KG/M2 | SYSTOLIC BLOOD PRESSURE: 128 MMHG

## 2023-08-28 DIAGNOSIS — J30.2 SEASONAL ALLERGIC RHINITIS, UNSPECIFIED TRIGGER: ICD-10-CM

## 2023-08-28 DIAGNOSIS — G89.29 CHRONIC PAIN OF RIGHT KNEE: ICD-10-CM

## 2023-08-28 DIAGNOSIS — K21.9 GASTROESOPHAGEAL REFLUX DISEASE, UNSPECIFIED WHETHER ESOPHAGITIS PRESENT: ICD-10-CM

## 2023-08-28 DIAGNOSIS — R30.0 DYSURIA: ICD-10-CM

## 2023-08-28 DIAGNOSIS — M85.89 OSTEOPENIA OF MULTIPLE SITES: Primary | ICD-10-CM

## 2023-08-28 DIAGNOSIS — M25.561 CHRONIC PAIN OF RIGHT KNEE: ICD-10-CM

## 2023-08-28 DIAGNOSIS — E78.00 PURE HYPERCHOLESTEROLEMIA: Chronic | ICD-10-CM

## 2023-08-28 DIAGNOSIS — E04.1 THYROID NODULE: Chronic | ICD-10-CM

## 2023-08-28 DIAGNOSIS — E05.90 HYPERTHYROIDISM: Chronic | ICD-10-CM

## 2023-08-28 LAB
APPEARANCE UR: ABNORMAL
BACTERIA URNS QL MICRO: ABNORMAL /HPF
BILIRUB UR QL: NEGATIVE
CASTS URNS QL MICRO: ABNORMAL /LPF (ref 0–2)
COLOR UR: ABNORMAL
EPI CELLS #/AREA URNS HPF: ABNORMAL /HPF (ref 0–5)
GLUCOSE UR STRIP.AUTO-MCNC: NEGATIVE MG/DL
HGB UR QL STRIP: ABNORMAL
KETONES UR QL STRIP.AUTO: NEGATIVE MG/DL
LEUKOCYTE ESTERASE UR QL STRIP.AUTO: ABNORMAL
MUCOUS THREADS URNS QL MICRO: 0 /LPF
NITRITE UR QL STRIP.AUTO: NEGATIVE
PH UR STRIP: 7.5 (ref 5–9)
PROT UR STRIP-MCNC: NEGATIVE MG/DL
RBC #/AREA URNS HPF: ABNORMAL /HPF (ref 0–5)
SP GR UR REFRACTOMETRY: 1.02 (ref 1–1.02)
URINE CULTURE IF INDICATED: ABNORMAL
UROBILINOGEN UR QL STRIP.AUTO: 0.2 EU/DL (ref 0.2–1)
WBC URNS QL MICRO: ABNORMAL /HPF (ref 0–4)

## 2023-08-28 PROCEDURE — 3017F COLORECTAL CA SCREEN DOC REV: CPT | Performed by: NURSE PRACTITIONER

## 2023-08-28 PROCEDURE — G8399 PT W/DXA RESULTS DOCUMENT: HCPCS | Performed by: NURSE PRACTITIONER

## 2023-08-28 PROCEDURE — 99215 OFFICE O/P EST HI 40 MIN: CPT | Performed by: NURSE PRACTITIONER

## 2023-08-28 PROCEDURE — G8417 CALC BMI ABV UP PARAM F/U: HCPCS | Performed by: NURSE PRACTITIONER

## 2023-08-28 PROCEDURE — 1036F TOBACCO NON-USER: CPT | Performed by: NURSE PRACTITIONER

## 2023-08-28 PROCEDURE — G8427 DOCREV CUR MEDS BY ELIG CLIN: HCPCS | Performed by: NURSE PRACTITIONER

## 2023-08-28 PROCEDURE — 1090F PRES/ABSN URINE INCON ASSESS: CPT | Performed by: NURSE PRACTITIONER

## 2023-08-28 PROCEDURE — 1123F ACP DISCUSS/DSCN MKR DOCD: CPT | Performed by: NURSE PRACTITIONER

## 2023-08-28 RX ORDER — FAMOTIDINE 10 MG/ML
20 INJECTION, SOLUTION INTRAVENOUS
OUTPATIENT
Start: 2023-08-29

## 2023-08-28 RX ORDER — SODIUM CHLORIDE 9 MG/ML
INJECTION, SOLUTION INTRAVENOUS CONTINUOUS
OUTPATIENT
Start: 2023-08-29

## 2023-08-28 RX ORDER — DIPHENHYDRAMINE HYDROCHLORIDE 50 MG/ML
50 INJECTION INTRAMUSCULAR; INTRAVENOUS
OUTPATIENT
Start: 2023-08-29

## 2023-08-28 RX ORDER — TRIAMCINOLONE ACETONIDE 55 UG/1
2 SPRAY, METERED NASAL
Qty: 3 EACH | Refills: 3 | Status: SHIPPED | OUTPATIENT
Start: 2023-08-28 | End: 2023-08-28 | Stop reason: SDUPTHER

## 2023-08-28 RX ORDER — EPINEPHRINE 1 MG/ML
0.3 INJECTION, SOLUTION, CONCENTRATE INTRAVENOUS PRN
OUTPATIENT
Start: 2023-08-29

## 2023-08-28 RX ORDER — ALBUTEROL SULFATE 90 UG/1
4 AEROSOL, METERED RESPIRATORY (INHALATION) PRN
OUTPATIENT
Start: 2023-08-29

## 2023-08-28 RX ORDER — ONDANSETRON 2 MG/ML
8 INJECTION INTRAMUSCULAR; INTRAVENOUS
OUTPATIENT
Start: 2023-08-29

## 2023-08-28 RX ORDER — TRIAMCINOLONE ACETONIDE 55 UG/1
2 SPRAY, METERED NASAL
Qty: 3 EACH | Refills: 3 | Status: SHIPPED | OUTPATIENT
Start: 2023-08-28

## 2023-08-28 RX ORDER — ACETAMINOPHEN 325 MG/1
650 TABLET ORAL
OUTPATIENT
Start: 2023-08-29

## 2023-08-28 ASSESSMENT — ENCOUNTER SYMPTOMS
ABDOMINAL PAIN: 0
WHEEZING: 0
SHORTNESS OF BREATH: 0
DIARRHEA: 0
CHEST TIGHTNESS: 0
SORE THROAT: 0
TROUBLE SWALLOWING: 0
NAUSEA: 0
CONSTIPATION: 0
VOICE CHANGE: 0
VOMITING: 0

## 2023-08-28 NOTE — PROGRESS NOTES
COMPREHENSIVE EVALUATION, ESTABLISHED PATIENT    SUBJECTIVE:   Laura Herndon is a 74 y.o. female seen for a comprehensive evaluation of existing and new medical problems. Chief Complaint   Patient presents with    Annual Exam    Discuss Labs     HPI  Cholesterol Problem   The history is provided by the patient and medical records. This is a chronic problem. Episode onset: 2016. The problem occurs constantly. The problem has not changed since onset. Pertinent negatives include no chest pain, no headaches and no shortness of breath. GERD   The history is provided by the patient and medical records. This is a chronic problem. Episode onset: \"at least 10 years\" Pertinent negatives include no chest pain, no abdominal pain, no cough. Hot flashes. Inability to tolerate heat. Hysterectomy at age 52. HRT x 10 years. Has been off HRT since 2014. Has hot flashes since that time. Hyperthyroidism. Onset 2021. On methimazole. Now followed by endocrinology. Allergic rhinitis  Seasonal.  Worse in Spring and Fall. Using Zyrtec and Flonase with adequate control of symptoms. Osteopenia  Mild low bone density. On calcium with vitamin D replacement. Current Outpatient Medications   Medication Sig Dispense Refill    methIMAzole (TAPAZOLE) 5 MG tablet Take 0.5 tablets by mouth daily 15 tablet 11    Lysine HCl 500 MG TABS       omeprazole (PRILOSEC OTC) 20 MG tablet Take 1 tablet by mouth every other day      fluticasone (FLONASE) 50 MCG/ACT nasal spray 2 sprays by Nasal route daily 3 each 3    Omega-3 Fatty Acids (FISH OIL PO) Take by mouth      Cetirizine HCl (ZYRTEC PO) Take by mouth      Multiple Vitamin (DAILY-VITAMIN PO) Take by mouth      Calcium Carbonate-Vitamin D (CALCIUM-VITAMIN D) 600-125 MG-UNIT TABS Take 1 tablet by mouth 2 times daily      psyllium 0.52 g capsule Take 1 capsule by mouth Daily with lunch       No current facility-administered medications for this visit.      Patient

## 2023-08-28 NOTE — TELEPHONE ENCOUNTER
----- Message from Laura Herndon sent at 8/28/2023  4:22 PM EDT -----  Regarding: Urine culture  Contact: [phone]  Any word on the results of my urine culture today?       Laura Herndon

## 2023-08-28 NOTE — TELEPHONE ENCOUNTER
Called and notified patient that the results have not been resulted as of yet. Informed patient that she would receive a call once the results have come in. Patient verbalized understanding.

## 2023-08-30 ENCOUNTER — TELEPHONE (OUTPATIENT)
Dept: INTERNAL MEDICINE CLINIC | Facility: CLINIC | Age: 73
End: 2023-08-30

## 2023-08-30 DIAGNOSIS — R30.0 DYSURIA: Primary | ICD-10-CM

## 2023-08-30 RX ORDER — CIPROFLOXACIN 250 MG/1
TABLET, FILM COATED ORAL
Qty: 14 TABLET | Refills: 0 | Status: SHIPPED | OUTPATIENT
Start: 2023-08-30

## 2023-08-30 NOTE — TELEPHONE ENCOUNTER
Please call:  Cipro 250 mg BID x7 days. 1st dose take 2 tablets. Hold calcium while on antibiotic. (Prescription was already sent to the pharmacy).

## 2023-08-31 LAB
BACTERIA SPEC CULT: ABNORMAL
SERVICE CMNT-IMP: ABNORMAL

## 2023-09-06 ENCOUNTER — OFFICE VISIT (OUTPATIENT)
Dept: ENT CLINIC | Age: 73
End: 2023-09-06
Payer: MEDICARE

## 2023-09-06 ENCOUNTER — OFFICE VISIT (OUTPATIENT)
Dept: AUDIOLOGY | Age: 73
End: 2023-09-06
Payer: MEDICARE

## 2023-09-06 VITALS — BODY MASS INDEX: 27 KG/M2 | HEIGHT: 66 IN | WEIGHT: 168 LBS

## 2023-09-06 DIAGNOSIS — H90.3 SENSORINEURAL HEARING LOSS, BILATERAL: Primary | ICD-10-CM

## 2023-09-06 DIAGNOSIS — H65.91 FLUID LEVEL BEHIND TYMPANIC MEMBRANE OF RIGHT EAR: Chronic | ICD-10-CM

## 2023-09-06 DIAGNOSIS — J30.2 SEASONAL ALLERGIC RHINITIS, UNSPECIFIED TRIGGER: Primary | Chronic | ICD-10-CM

## 2023-09-06 PROCEDURE — 92567 TYMPANOMETRY: CPT | Performed by: AUDIOLOGIST

## 2023-09-06 PROCEDURE — 1123F ACP DISCUSS/DSCN MKR DOCD: CPT | Performed by: PHYSICIAN ASSISTANT

## 2023-09-06 PROCEDURE — 99204 OFFICE O/P NEW MOD 45 MIN: CPT | Performed by: PHYSICIAN ASSISTANT

## 2023-09-06 PROCEDURE — 92557 COMPREHENSIVE HEARING TEST: CPT | Performed by: AUDIOLOGIST

## 2023-09-06 RX ORDER — AZELASTINE HYDROCHLORIDE 137 UG/1
1 SPRAY, METERED NASAL 2 TIMES DAILY
Qty: 1 EACH | Refills: 2 | Status: SHIPPED | OUTPATIENT
Start: 2023-09-06

## 2023-09-06 ASSESSMENT — ENCOUNTER SYMPTOMS
SORE THROAT: 0
SHORTNESS OF BREATH: 0
ABDOMINAL PAIN: 0

## 2023-09-06 NOTE — PROGRESS NOTES
Michelle Carlson is a 74 y.o. female presents today with c/o hearing loss. Her children complain that she cannot hear well. Her last audiogram was about 10 years ago and she had a 30% loss at that time wonders if it is gotten worse. She has more complaints with sinus issues. She has hypothyroidism that has not been well controlled since her recent UTI treated with Ciprofloxacin has kept her feeling quite off. She has nasal congestion postnasal drainage known allergies to environmental triggers but has never done immunotherapy as she declined at the time. She uses Flonase but is recently switched to Nasacort and Zyrtec. .  Audiogram:     Left ear: Normal hearing  to moderately severe snhl  Right ear: Normal hearing to moderately severe snhl  Discrimination score: left ear 92 % and right ear 92 %  Tympanogram: left ear Type A  and right ear Type C. Chief Complaint   Patient presents with    Hearing Problem     Patient states that she is here for hearing test and that she has some sinus issue as well. Patient Active Problem List   Diagnosis    Cyst of tendon sheath    Osteoarthritis    Pure hypercholesterolemia    Hypoxia    H/O total hip arthroplasty, left    GERD (gastroesophageal reflux disease)    Hyperthyroidism    Seasonal allergic rhinitis    Thyroid nodule    Snoring    MR (mitral regurgitation)    Tendinitis of left hip flexor    Anemia    Palpitations    History of total right hip arthroplasty    Leg length discrepancy    DAPHNIE (obstructive sleep apnea)    Fatigue    Precordial pain    Osteopenia of multiple sites        Reviewed and updated this visit by provider:  Tobacco  Allergies  Meds  Problems  Med Hx  Surg Hx  Fam Hx         Review of Systems   Constitutional:  Negative for fever. HENT:  Positive for hearing loss. Negative for ear pain and sore throat. Eyes:  Negative for visual disturbance. Respiratory:  Negative for shortness of breath.     Cardiovascular:  Negative

## 2023-09-06 NOTE — PROGRESS NOTES
AUDIOLOGY EVALUATION    Flavio Jacobo had Tympanometry and Audiometry performed today. The patient reports hearing loss. Results as follows:    Tympanometry    Type A -  on left  Type C -  on right    Audiometry    Test Performed - Comprehensive Audiogram    Type of Loss - Right Ear: abnormal hearing: degree of loss is normal to moderately severe sensorineural hearing loss                           Left Ear: abnormal hearing: degree of loss is normal to moderately severe sensorineural hearing loss     SRT   Measurement Right Ear Left Ear   Value 10 15   Unit dB dB     Discrimination  Measurement Right Ear Left Ear   Value 92% 92%   Unit dB dB     Recommend  Binaural amplification and annual audios    A.  [address] [facility], [address] [facility]  Audiologist

## 2023-09-20 DIAGNOSIS — R30.0 DYSURIA: ICD-10-CM

## 2023-09-20 LAB
APPEARANCE UR: CLEAR
BACTERIA URNS QL MICRO: NEGATIVE /HPF
BILIRUB UR QL: NEGATIVE
CASTS URNS QL MICRO: NORMAL /LPF (ref 0–2)
COLOR UR: NORMAL
EPI CELLS #/AREA URNS HPF: NORMAL /HPF (ref 0–5)
GLUCOSE UR STRIP.AUTO-MCNC: NEGATIVE MG/DL
HGB UR QL STRIP: NEGATIVE
KETONES UR QL STRIP.AUTO: NEGATIVE MG/DL
LEUKOCYTE ESTERASE UR QL STRIP.AUTO: NEGATIVE
MUCOUS THREADS URNS QL MICRO: 0 /LPF
NITRITE UR QL STRIP.AUTO: NEGATIVE
PH UR STRIP: 6 (ref 5–9)
PROT UR STRIP-MCNC: NEGATIVE MG/DL
RBC #/AREA URNS HPF: NORMAL /HPF (ref 0–5)
SP GR UR REFRACTOMETRY: 1.01 (ref 1–1.02)
URINE CULTURE IF INDICATED: NORMAL
UROBILINOGEN UR QL STRIP.AUTO: 0.2 EU/DL (ref 0.2–1)
WBC URNS QL MICRO: NORMAL /HPF (ref 0–4)

## 2023-09-25 ENCOUNTER — TRANSCRIBE ORDERS (OUTPATIENT)
Dept: SCHEDULING | Age: 73
End: 2023-09-25

## 2023-09-25 DIAGNOSIS — Z12.31 VISIT FOR SCREENING MAMMOGRAM: Primary | ICD-10-CM

## 2023-09-26 ENCOUNTER — TELEPHONE (OUTPATIENT)
Dept: INTERNAL MEDICINE CLINIC | Facility: CLINIC | Age: 73
End: 2023-09-26

## 2023-09-26 NOTE — TELEPHONE ENCOUNTER
Called and notified patient that the order/referral for the bilateral mammogram was placed on yesterday by Arsen Pires NP. Patient verbalized understanding.

## 2023-09-26 NOTE — TELEPHONE ENCOUNTER
----- Message from Jessica Rojo sent at 9/26/2023  9:08 AM EDT -----  Regarding: Mammogram  Contact: [phone]  I have scheduled my mammogram for December 14. Please confirm that my orders are in the system. I will be using the [facility].      Thank you,    Jessica Rojo

## 2023-10-02 ENCOUNTER — PATIENT MESSAGE (OUTPATIENT)
Dept: ENDOCRINOLOGY | Age: 73
End: 2023-10-02

## 2023-10-02 DIAGNOSIS — E05.90 HYPERTHYROIDISM: ICD-10-CM

## 2023-10-02 LAB
ALBUMIN SERPL-MCNC: 3.6 G/DL (ref 3.2–4.6)
ALBUMIN/GLOB SERPL: 1.3 (ref 0.4–1.6)
ALP SERPL-CCNC: 101 U/L (ref 50–136)
ALT SERPL-CCNC: 26 U/L (ref 12–65)
AST SERPL-CCNC: 20 U/L (ref 15–37)
BILIRUB DIRECT SERPL-MCNC: 0.2 MG/DL
BILIRUB SERPL-MCNC: 1.1 MG/DL (ref 0.2–1.1)
GLOBULIN SER CALC-MCNC: 2.7 G/DL (ref 2.8–4.5)
PROT SERPL-MCNC: 6.3 G/DL (ref 6.3–8.2)
T3 SERPL-MCNC: 2.05 NG/ML (ref 0.6–1.81)
T4 FREE SERPL-MCNC: 1.9 NG/DL (ref 0.78–1.46)
TSH, 3RD GENERATION: <0.005 UIU/ML (ref 0.36–3.74)

## 2023-10-03 RX ORDER — METHIMAZOLE 5 MG/1
5 TABLET ORAL DAILY
Qty: 30 TABLET | Refills: 11 | Status: SHIPPED | OUTPATIENT
Start: 2023-10-03

## 2023-10-03 NOTE — TELEPHONE ENCOUNTER
My Chart message to patient:    Thank you. Please increase your dose to 5 mg/day. I sent a prescription to your pharmacy. Please return to the lab sometime the week before your next appointment with me to recheck thyroid levels. Thanks.

## 2023-10-06 ENCOUNTER — OFFICE VISIT (OUTPATIENT)
Dept: ORTHOPEDIC SURGERY | Age: 73
End: 2023-10-06

## 2023-10-06 DIAGNOSIS — M25.511 BILATERAL SHOULDER PAIN, UNSPECIFIED CHRONICITY: Primary | ICD-10-CM

## 2023-10-06 DIAGNOSIS — M19.012 PRIMARY OSTEOARTHRITIS OF BOTH SHOULDERS: ICD-10-CM

## 2023-10-06 DIAGNOSIS — M25.512 BILATERAL SHOULDER PAIN, UNSPECIFIED CHRONICITY: Primary | ICD-10-CM

## 2023-10-06 DIAGNOSIS — M19.011 PRIMARY OSTEOARTHRITIS OF BOTH SHOULDERS: ICD-10-CM

## 2023-10-06 RX ORDER — METHYLPREDNISOLONE ACETATE 40 MG/ML
80 INJECTION, SUSPENSION INTRA-ARTICULAR; INTRALESIONAL; INTRAMUSCULAR; SOFT TISSUE ONCE
Status: COMPLETED | OUTPATIENT
Start: 2023-10-06 | End: 2023-10-06

## 2023-10-06 RX ORDER — METHYLPREDNISOLONE ACETATE 40 MG/ML
160 INJECTION, SUSPENSION INTRA-ARTICULAR; INTRALESIONAL; INTRAMUSCULAR; SOFT TISSUE ONCE
Status: SHIPPED | OUTPATIENT
Start: 2023-10-06

## 2023-10-06 RX ADMIN — METHYLPREDNISOLONE ACETATE 80 MG: 40 INJECTION, SUSPENSION INTRA-ARTICULAR; INTRALESIONAL; INTRAMUSCULAR; SOFT TISSUE at 10:46

## 2023-10-18 ENCOUNTER — TELEPHONE (OUTPATIENT)
Dept: INTERNAL MEDICINE CLINIC | Facility: CLINIC | Age: 73
End: 2023-10-18

## 2023-10-18 NOTE — TELEPHONE ENCOUNTER
----- Message from Laura Herndon sent at 10/18/2023 10:44 AM EDT -----  Regarding: Flu and Covid shots  Contact: [phone]  My apologies, Adelia Lane, I received both shots at the [facility] on [facility].

## 2023-10-18 NOTE — TELEPHONE ENCOUNTER
Will place the vaccines in the patient's chart for the Flu & COVID vaccines given on 10/17/2023 at [facility] on [facility].

## 2023-11-17 ENCOUNTER — OFFICE VISIT (OUTPATIENT)
Dept: ORTHOPEDIC SURGERY | Age: 73
End: 2023-11-17
Payer: MEDICARE

## 2023-11-17 VITALS — BODY MASS INDEX: 27 KG/M2 | HEIGHT: 66 IN | WEIGHT: 168 LBS

## 2023-11-17 DIAGNOSIS — M19.012 PRIMARY OSTEOARTHRITIS OF BOTH SHOULDERS: Primary | ICD-10-CM

## 2023-11-17 DIAGNOSIS — M19.011 PRIMARY OSTEOARTHRITIS OF BOTH SHOULDERS: Primary | ICD-10-CM

## 2023-11-17 PROCEDURE — G8417 CALC BMI ABV UP PARAM F/U: HCPCS | Performed by: PHYSICIAN ASSISTANT

## 2023-11-17 PROCEDURE — G8484 FLU IMMUNIZE NO ADMIN: HCPCS | Performed by: PHYSICIAN ASSISTANT

## 2023-11-17 PROCEDURE — 1090F PRES/ABSN URINE INCON ASSESS: CPT | Performed by: PHYSICIAN ASSISTANT

## 2023-11-17 PROCEDURE — 3017F COLORECTAL CA SCREEN DOC REV: CPT | Performed by: PHYSICIAN ASSISTANT

## 2023-11-17 PROCEDURE — 1123F ACP DISCUSS/DSCN MKR DOCD: CPT | Performed by: PHYSICIAN ASSISTANT

## 2023-11-17 PROCEDURE — G8427 DOCREV CUR MEDS BY ELIG CLIN: HCPCS | Performed by: PHYSICIAN ASSISTANT

## 2023-11-17 PROCEDURE — 99213 OFFICE O/P EST LOW 20 MIN: CPT | Performed by: PHYSICIAN ASSISTANT

## 2023-11-17 PROCEDURE — G8399 PT W/DXA RESULTS DOCUMENT: HCPCS | Performed by: PHYSICIAN ASSISTANT

## 2023-11-17 PROCEDURE — 1036F TOBACCO NON-USER: CPT | Performed by: PHYSICIAN ASSISTANT

## 2023-11-17 NOTE — PROGRESS NOTES
Name: Apolonia Deshpande  YOB: 1950  Gender: female  MRN: [id number]    CC:   Chief Complaint   Patient presents with    Follow-up     Bilateral shoulder        HPI: Patient is in today for follow-up of bilateral shoulder pain. Patient had a left shoulder glenohumeral injection on 10-6/2030 and states relief with injection. This patient states that she had improvement with the injection. She states that it relieves a lot of her pain but still has some continued anterior pain. Notes ADLs are significantly improved. Minimal interference with sleep. Denies numbness and tingling     recall:chronic history of Left and right shoulder pain and limited motion. Patient has previously seen Dr. Linton Duverney. She states she thinks she may have had a CSI in the past.  She states she used to swim a lot and after COVID she tried to return to swimming and began having increased shoulder pain. The patient notes deep, anterior and lateral shoulder pain. The patient denies numbness and tingling. She does note catching. Denies popping, clicking and cracking. The patient notes interference with sleep when she sleeps on the side. Notes left is greater than right.           Allergies   Allergen Reactions    Sulfa Antibiotics Hives and Rash     Past Medical History:   Diagnosis Date    Anxiety disorder     Chronic rhinitis     Dysfunction of eustachian tube     Gastroesophageal reflux disease     Hyperlipidemia     Hyperthyroidism 1019    Mild mitral regurgitation     Osteoarthritis     Before 2016    Osteopenia     Seasonal allergic rhinitis     Tinnitus      Past Surgical History:   Procedure Laterality Date    COLONOSCOPY  01/23/2012    diverticulosis, internal hemorrhoids (Dr. Jasmin Al)    HYSTERECTOMY, TOTAL ABDOMINAL (CERVIX REMOVED)  2005    JOINT REPLACEMENT  11/2016 & 4/2018    Both hips have been replaced    RAY AND BSO (CERVIX REMOVED)  2003    TONSILLECTOMY  childhood    TOTAL HIP ARTHROPLASTY Right

## 2023-11-29 DIAGNOSIS — E05.90 HYPERTHYROIDISM: ICD-10-CM

## 2023-11-29 LAB
ALBUMIN SERPL-MCNC: 3.6 G/DL (ref 3.2–4.6)
ALBUMIN/GLOB SERPL: 1.3 (ref 0.4–1.6)
ALP SERPL-CCNC: 108 U/L (ref 50–136)
ALT SERPL-CCNC: 23 U/L (ref 12–65)
AST SERPL-CCNC: 16 U/L (ref 15–37)
BILIRUB DIRECT SERPL-MCNC: 0.3 MG/DL
BILIRUB SERPL-MCNC: 1.2 MG/DL (ref 0.2–1.1)
GLOBULIN SER CALC-MCNC: 2.7 G/DL (ref 2.8–4.5)
PROT SERPL-MCNC: 6.3 G/DL (ref 6.3–8.2)
T3 SERPL-MCNC: 1.29 NG/ML (ref 0.6–1.81)
T4 FREE SERPL-MCNC: 1.3 NG/DL (ref 0.78–1.46)
TSH, 3RD GENERATION: <0.005 UIU/ML (ref 0.36–3.74)

## 2023-11-30 ENCOUNTER — OFFICE VISIT (OUTPATIENT)
Dept: ENDOCRINOLOGY | Age: 73
End: 2023-11-30
Payer: MEDICARE

## 2023-11-30 VITALS
DIASTOLIC BLOOD PRESSURE: 62 MMHG | SYSTOLIC BLOOD PRESSURE: 120 MMHG | OXYGEN SATURATION: 99 % | HEART RATE: 91 BPM | WEIGHT: 170 LBS | BODY MASS INDEX: 27.86 KG/M2

## 2023-11-30 DIAGNOSIS — E04.1 THYROID NODULE: ICD-10-CM

## 2023-11-30 DIAGNOSIS — E05.90 HYPERTHYROIDISM: Primary | ICD-10-CM

## 2023-11-30 PROCEDURE — G8427 DOCREV CUR MEDS BY ELIG CLIN: HCPCS | Performed by: INTERNAL MEDICINE

## 2023-11-30 PROCEDURE — G8484 FLU IMMUNIZE NO ADMIN: HCPCS | Performed by: INTERNAL MEDICINE

## 2023-11-30 PROCEDURE — G8399 PT W/DXA RESULTS DOCUMENT: HCPCS | Performed by: INTERNAL MEDICINE

## 2023-11-30 PROCEDURE — G8417 CALC BMI ABV UP PARAM F/U: HCPCS | Performed by: INTERNAL MEDICINE

## 2023-11-30 PROCEDURE — 1123F ACP DISCUSS/DSCN MKR DOCD: CPT | Performed by: INTERNAL MEDICINE

## 2023-11-30 PROCEDURE — 3017F COLORECTAL CA SCREEN DOC REV: CPT | Performed by: INTERNAL MEDICINE

## 2023-11-30 PROCEDURE — 99214 OFFICE O/P EST MOD 30 MIN: CPT | Performed by: INTERNAL MEDICINE

## 2023-11-30 PROCEDURE — 1036F TOBACCO NON-USER: CPT | Performed by: INTERNAL MEDICINE

## 2023-11-30 PROCEDURE — 1090F PRES/ABSN URINE INCON ASSESS: CPT | Performed by: INTERNAL MEDICINE

## 2023-11-30 RX ORDER — METHIMAZOLE 5 MG/1
5 TABLET ORAL DAILY
Qty: 30 TABLET | Refills: 11 | Status: SHIPPED | OUTPATIENT
Start: 2023-11-30

## 2023-11-30 ASSESSMENT — ENCOUNTER SYMPTOMS
VOICE CHANGE: 0
CONSTIPATION: 0
DIARRHEA: 0
TROUBLE SWALLOWING: 0

## 2023-11-30 NOTE — PROGRESS NOTES
Dose Route Frequency Provider Last Rate Last Admin    methylPREDNISolone acetate (DEPO-MEDROL) injection 160 mg  160 mg Intra-artICUlar Once Michelle Shows, Joanna Piper MD, FACE      Portions of this note were generated with the assistance of voice recognition software. As such, some errors in transcription may be present.

## 2023-12-07 ENCOUNTER — OFFICE VISIT (OUTPATIENT)
Dept: ORTHOPEDIC SURGERY | Age: 73
End: 2023-12-07

## 2023-12-07 DIAGNOSIS — M19.012 PRIMARY OSTEOARTHRITIS OF BOTH SHOULDERS: Primary | ICD-10-CM

## 2023-12-07 DIAGNOSIS — M25.511 BILATERAL SHOULDER PAIN, UNSPECIFIED CHRONICITY: ICD-10-CM

## 2023-12-07 DIAGNOSIS — M19.011 PRIMARY OSTEOARTHRITIS OF BOTH SHOULDERS: Primary | ICD-10-CM

## 2023-12-07 DIAGNOSIS — M25.512 BILATERAL SHOULDER PAIN, UNSPECIFIED CHRONICITY: ICD-10-CM

## 2023-12-07 RX ORDER — METHYLPREDNISOLONE ACETATE 40 MG/ML
80 INJECTION, SUSPENSION INTRA-ARTICULAR; INTRALESIONAL; INTRAMUSCULAR; SOFT TISSUE ONCE
Status: COMPLETED | OUTPATIENT
Start: 2023-12-07 | End: 2023-12-07

## 2023-12-07 RX ADMIN — METHYLPREDNISOLONE ACETATE 80 MG: 40 INJECTION, SUSPENSION INTRA-ARTICULAR; INTRALESIONAL; INTRAMUSCULAR; SOFT TISSUE at 16:04

## 2023-12-09 NOTE — PROGRESS NOTES
Name: Mohamud Nelson  YOB: 1950  Gender: female  MRN: [id number]    CC:   Chief Complaint   Patient presents with    Shoulder Pain     Left shoulder CSI        HPI: Patient presents for FU of bilateral left greater than right shoulder pain. Patient had a glenohumeral injection on 10-6-23. She did well with injection. She notes continued anterior shoulder pain. She has had some of this in the past and we discussed possible alternative injection vs advanced imaging and ultimately surgical intervention. No new injury. Denies numbness and tingling.      Allergies   Allergen Reactions    Sulfa Antibiotics Hives and Rash     Past Medical History:   Diagnosis Date    Anxiety disorder     Chronic rhinitis     Dysfunction of eustachian tube     Gastroesophageal reflux disease     Hyperlipidemia     Hyperthyroidism 1019    Mild mitral regurgitation     Osteoarthritis     Before 2016    Osteopenia     Seasonal allergic rhinitis     Tinnitus      Past Surgical History:   Procedure Laterality Date    COLONOSCOPY  01/23/2012    diverticulosis, internal hemorrhoids (Dr. Joanne Osorio)    HYSTERECTOMY, TOTAL ABDOMINAL (CERVIX REMOVED)  2005    JOINT REPLACEMENT  11/2016 & 4/2018    Both hips have been replaced    RAY AND BSO (CERVIX REMOVED)  2003    TONSILLECTOMY  childhood    TOTAL HIP ARTHROPLASTY Right 11/2016    TOTAL HIP ARTHROPLASTY Left 04/2018    UPPER GASTROINTESTINAL ENDOSCOPY  2008     Family History   Problem Relation Age of Onset    Breast Cancer Mother 36    Thyroid Disease Mother         treated with thyroidectomy    Cancer Mother     No Known Problems Daughter     No Known Problems Father     Mult Sclerosis Brother     No Known Problems Brother     No Known Problems Daughter     No Known Problems Son     Thyroid Cancer Neg Hx      Social History     Socioeconomic History    Marital status: Single     Spouse name: Not on file    Number of children: Not on file    Years of education: Not on file

## 2023-12-14 ENCOUNTER — HOSPITAL ENCOUNTER (OUTPATIENT)
Dept: MAMMOGRAPHY | Age: 73
Discharge: HOME | End: 2023-12-14
Payer: MEDICARE

## 2023-12-14 DIAGNOSIS — Z12.31 VISIT FOR SCREENING MAMMOGRAM: ICD-10-CM

## 2023-12-14 PROCEDURE — 77063 BREAST TOMOSYNTHESIS BI: CPT

## 2024-01-30 ENCOUNTER — HOSPITAL ENCOUNTER (OUTPATIENT)
Dept: LAB | Age: 74
Discharge: HOME | End: 2024-02-02

## 2024-01-30 DIAGNOSIS — E05.90 HYPERTHYROIDISM: ICD-10-CM

## 2024-01-30 LAB
ALBUMIN SERPL-MCNC: 3.9 G/DL (ref 3.2–4.6)
ALBUMIN/GLOB SERPL: 1.6 (ref 0.4–1.6)
ALP SERPL-CCNC: 108 U/L (ref 50–136)
ALT SERPL-CCNC: 21 U/L (ref 12–65)
AST SERPL-CCNC: 16 U/L (ref 15–37)
BILIRUB DIRECT SERPL-MCNC: 0.2 MG/DL
BILIRUB SERPL-MCNC: 1.1 MG/DL (ref 0.2–1.1)
GLOBULIN SER CALC-MCNC: 2.5 G/DL (ref 2.8–4.5)
PROT SERPL-MCNC: 6.4 G/DL (ref 6.3–8.2)
T3 SERPL-MCNC: 1.42 NG/ML (ref 0.6–1.81)
T4 FREE SERPL-MCNC: 1.1 NG/DL (ref 0.78–1.46)
TSH, 3RD GENERATION: 0.01 UIU/ML (ref 0.36–3.74)

## 2024-02-08 ENCOUNTER — PATIENT MESSAGE (OUTPATIENT)
Dept: INTERNAL MEDICINE CLINIC | Facility: CLINIC | Age: 74
End: 2024-02-08

## 2024-02-08 DIAGNOSIS — U07.1 COVID: Primary | ICD-10-CM

## 2024-02-09 RX ORDER — BENZONATATE 200 MG/1
200 CAPSULE ORAL 3 TIMES DAILY PRN
Qty: 30 CAPSULE | Refills: 0 | Status: SHIPPED | OUTPATIENT
Start: 2024-02-09 | End: 2024-02-16

## 2024-02-09 NOTE — TELEPHONE ENCOUNTER
She is on Day #4.  Feeling a little better today then yesterday.  After discussing Paxlovid she declines Paxlovid.  Symptoms rhinorrhea and cough.  Recommend astelin nasal spray.  Also may use Mucinex-DM and benzonatate.  Verbalizes understanding.   If feeling short of breath or worsening of symptoms go to ER.  She agrees.

## 2024-04-01 ENCOUNTER — HOSPITAL ENCOUNTER (OUTPATIENT)
Dept: LAB | Age: 74
Discharge: HOME | End: 2024-04-04

## 2024-04-01 ENCOUNTER — TELEPHONE (OUTPATIENT)
Dept: ORTHOPEDIC SURGERY | Age: 74
End: 2024-04-01

## 2024-04-01 DIAGNOSIS — E05.90 HYPERTHYROIDISM: ICD-10-CM

## 2024-04-01 LAB
ALBUMIN SERPL-MCNC: 3.9 G/DL (ref 3.2–4.6)
ALBUMIN/GLOB SERPL: 1.3 (ref 0.4–1.6)
ALP SERPL-CCNC: 111 U/L (ref 50–136)
ALT SERPL-CCNC: 25 U/L (ref 12–65)
AST SERPL-CCNC: 21 U/L (ref 15–37)
BILIRUB DIRECT SERPL-MCNC: 0.2 MG/DL
BILIRUB SERPL-MCNC: 1.2 MG/DL (ref 0.2–1.1)
GLOBULIN SER CALC-MCNC: 2.9 G/DL (ref 2.8–4.5)
PROT SERPL-MCNC: 6.8 G/DL (ref 6.3–8.2)
T3 SERPL-MCNC: 1.17 NG/ML (ref 0.6–1.81)
T4 FREE SERPL-MCNC: 1.1 NG/DL (ref 0.78–1.46)
TSH, 3RD GENERATION: 0.14 UIU/ML (ref 0.36–3.74)

## 2024-04-02 NOTE — TELEPHONE ENCOUNTER
REASON FOR VISIT:  Chief Complaint   Patient presents with   • Office Visit     Vsp   • Eye Exam       HISTORY OF PRESENT ILLNESS: Katina SIERRA is a 56 year old female who presents for a comprehensive dilated eye examination and refraction for evaluation of ocular health and refractive error. Patient is new to my office. Her last eye examination was in 2020. She reports a new floater developing in the vision of the right eye 4 months ago without photopsia; the floater has become less noticeable over time. She feels her vision is stable. She has a family history of glaucoma - father.     ASSESSMENT:  1. Myopia of both eyes with regular astigmatism and presbyopia    2. Posterior vitreous detachment of right eye        PLAN:  Orders Placed This Encounter   Procedures   • REFRACTION     -Prescription given for eyeglasses as written below. Recommend 100% UV protection when outdoors to lessen the risk of cataracts, macular degeneration, and eyelid skin cancers.   -Patient has developed a posterior vitreous detachment without retinal holes, tears, or detachment on dilated funduscopic exam. Discussed the natural history of posterior vitreous detachments, expectations, and risk of developing a retinal detachment in the first 6 months. Instructed her to call or return immediately with increased floaters, flashes, shadows/veils/curtains, or blurred vision. She was given information on retinal detachment and floaters.     FOLLOWUP:  Return in about 2 years (around 1/24/2025) for eye exam.      FINAL Rx GLASSES:  Final Rx       Sphere Cylinder Axis Dist VA Add    Right -2.50 +0.50 005 20/20 +2.25    Left -2.75 +0.50 175 20/20 +2.25    Expiration Date: 1/24/2024          I reviewed the allergies, medication list, and past medical, family, and social history sections listed in the medical record as well as any pertinent nursing/tech notes and recent labs.   Scheduled. Thank you

## 2024-04-03 ENCOUNTER — OFFICE VISIT (OUTPATIENT)
Dept: ENDOCRINOLOGY | Age: 74
End: 2024-04-03
Payer: MEDICARE

## 2024-04-03 VITALS
WEIGHT: 171 LBS | SYSTOLIC BLOOD PRESSURE: 120 MMHG | BODY MASS INDEX: 28.02 KG/M2 | HEART RATE: 70 BPM | DIASTOLIC BLOOD PRESSURE: 58 MMHG

## 2024-04-03 DIAGNOSIS — E04.1 THYROID NODULE: ICD-10-CM

## 2024-04-03 DIAGNOSIS — E05.90 HYPERTHYROIDISM: Primary | ICD-10-CM

## 2024-04-03 PROCEDURE — G2211 COMPLEX E/M VISIT ADD ON: HCPCS | Performed by: INTERNAL MEDICINE

## 2024-04-03 PROCEDURE — 1123F ACP DISCUSS/DSCN MKR DOCD: CPT | Performed by: INTERNAL MEDICINE

## 2024-04-03 PROCEDURE — 99214 OFFICE O/P EST MOD 30 MIN: CPT | Performed by: INTERNAL MEDICINE

## 2024-04-03 RX ORDER — METHIMAZOLE 5 MG/1
5 TABLET ORAL DAILY
Qty: 30 TABLET | Refills: 11
Start: 2024-04-03

## 2024-04-03 ASSESSMENT — ENCOUNTER SYMPTOMS
CONSTIPATION: 0
VOICE CHANGE: 0
TROUBLE SWALLOWING: 0
DIARRHEA: 0

## 2024-04-03 NOTE — PROGRESS NOTES
JESSICA Davis MD, [facility] ENDOCRINOLOGY   AND   THYROID NODULE CLINIC            Reason for visit: Follow-up of hyperthyroidism      ASSESSMENT AND PLAN:    1. Hyperthyroidism  She has responded well to recent changes in her methimazole dose.  Continue as prescribed.  Return in 3 months with labs.  I will provide her with longitudinal care for this problem.  - methIMAzole (TAPAZOLE) 5 MG tablet; Take 1 tablet by mouth daily  Dispense: 30 tablet; Refill: 11  - TSH; Future  - T4, Free; Future  - T3; Future  - Hepatic Function Panel; Future    2. Thyroid nodule  Per ACR criteria, ongoing ultrasound follow-up is not necessary.      Follow-up and Dispositions    Return in about 3 months (around 7/3/2024).                 History of Present Illness:    THYROID DYSFUNCTION  Eve Bullock is seen for follow-up of hyperthyroidism; this was diagnosed in July 2019.       Current symptoms: See review of systems below     Prior treatment: Methimazole 5 mg daily was started 10/14/2019.  Her dose has been adjusted as follows:  -2.5 mg daily 12/17/2019.  -2.5 mg daily 3 days/week 1/31/2023  -2.5 mg daily 8/23/2023  -5 mg daily 10/3/2023     Pertinent labs:  6/26/2015: TSH 3.390, free T4 1.30.  7/1/2016: TSH 0.830, free T4 1.30.  7/6/2017: TSH 1.960, free T4 1.17.  7/19/2018: TSH 1.830, free T4 1.24.  7/30/2019: TSH 0.028, free T4 1.71.  8/23/2019: TSH 0.033, free T4 1.60, free T3 2.7.  10/14/2019: TSH 0.090, free T4 1.52, T3 109, thyrotropin receptor antibodies less than 1.10 (-).  12/16/2019: TSH 3.150, free T4 1.04, T3 88.  3/16/2020: TSH 3.410, free T4 1.11, T3 102.  7/20/2020: TSH 2.740, free T4 1.04, T3 97.  1/20/2021: TSH 3.440, free T4 1.20, T3 102.  7/26/2021: TSH 2.250, free T4 1.30, T3 125.  8/11/2021: TSH 1.460.  1/27/2022: TSH 3.220, free T4 1.30, T3 100.  5/13/2022: TSH 2.180.  7/26/2022: TSH 3.540, free T4 1.20, T3 108.  1/30/2023: TSH 3.650, free T4 0.9, T3 0.89.  3/22/2023: TSH 1.550, free T4

## 2024-04-11 ENCOUNTER — OFFICE VISIT (OUTPATIENT)
Dept: ORTHOPEDIC SURGERY | Age: 74
End: 2024-04-11
Payer: MEDICARE

## 2024-04-11 DIAGNOSIS — M19.012 OSTEOARTHRITIS OF LEFT SHOULDER, UNSPECIFIED OSTEOARTHRITIS TYPE: Primary | ICD-10-CM

## 2024-04-11 PROCEDURE — 99215 OFFICE O/P EST HI 40 MIN: CPT | Performed by: PHYSICIAN ASSISTANT

## 2024-04-11 PROCEDURE — 1123F ACP DISCUSS/DSCN MKR DOCD: CPT | Performed by: PHYSICIAN ASSISTANT

## 2024-04-11 PROCEDURE — 20610 DRAIN/INJ JOINT/BURSA W/O US: CPT | Performed by: PHYSICIAN ASSISTANT

## 2024-04-16 RX ORDER — METHYLPREDNISOLONE ACETATE 40 MG/ML
80 INJECTION, SUSPENSION INTRA-ARTICULAR; INTRALESIONAL; INTRAMUSCULAR; SOFT TISSUE ONCE
Status: COMPLETED | OUTPATIENT
Start: 2024-04-16 | End: 2024-04-16

## 2024-04-16 RX ADMIN — METHYLPREDNISOLONE ACETATE 80 MG: 40 INJECTION, SUSPENSION INTRA-ARTICULAR; INTRALESIONAL; INTRAMUSCULAR; SOFT TISSUE at 12:29

## 2024-04-16 NOTE — PROGRESS NOTES
and the decision to not proceed with elective major surgery.  We have discussed this decision in detail.  The affected left shoulder was sterilely prepped in standard fashion and injected with 2 cc of depo medrol (40mg/ml), 2 cc of 1% Lidocaine, and 2 cc of 0.5% Marcaine into the joint space.  The patient tolerated the injection well.     No follow-ups on file.        DARIO Beaulieu  04/16/24

## 2024-07-05 ENCOUNTER — HOSPITAL ENCOUNTER (OUTPATIENT)
Dept: LAB | Age: 74
Discharge: HOME | End: 2024-07-08

## 2024-07-05 DIAGNOSIS — E05.90 HYPERTHYROIDISM: ICD-10-CM

## 2024-07-05 LAB
ALBUMIN SERPL-MCNC: 4 G/DL (ref 3.2–4.6)
ALBUMIN/GLOB SERPL: 1.7 (ref 1–1.9)
ALP SERPL-CCNC: 101 U/L (ref 35–104)
ALT SERPL-CCNC: 17 U/L (ref 12–65)
AST SERPL-CCNC: 24 U/L (ref 15–37)
BILIRUB DIRECT SERPL-MCNC: <0.2 MG/DL (ref 0–0.4)
BILIRUB SERPL-MCNC: 0.9 MG/DL (ref 0–1.2)
GLOBULIN SER CALC-MCNC: 2.4 G/DL (ref 2.3–3.5)
PROT SERPL-MCNC: 6.4 G/DL (ref 6.3–8.2)
T3 SERPL-MCNC: 1.37 NG/ML (ref 0.6–1.81)
T4 FREE SERPL-MCNC: 1.4 NG/DL (ref 0.9–1.7)
TSH, 3RD GENERATION: 0.05 UIU/ML (ref 0.27–4.2)

## 2024-07-08 ENCOUNTER — OFFICE VISIT (OUTPATIENT)
Dept: ENDOCRINOLOGY | Age: 74
End: 2024-07-08
Payer: MEDICARE

## 2024-07-08 VITALS
OXYGEN SATURATION: 98 % | WEIGHT: 168 LBS | BODY MASS INDEX: 27.53 KG/M2 | HEART RATE: 70 BPM | DIASTOLIC BLOOD PRESSURE: 60 MMHG | SYSTOLIC BLOOD PRESSURE: 110 MMHG

## 2024-07-08 DIAGNOSIS — E04.1 THYROID NODULE: ICD-10-CM

## 2024-07-08 DIAGNOSIS — E05.90 HYPERTHYROIDISM: Primary | ICD-10-CM

## 2024-07-08 PROCEDURE — 99214 OFFICE O/P EST MOD 30 MIN: CPT | Performed by: INTERNAL MEDICINE

## 2024-07-08 PROCEDURE — 1123F ACP DISCUSS/DSCN MKR DOCD: CPT | Performed by: INTERNAL MEDICINE

## 2024-07-08 PROCEDURE — G2211 COMPLEX E/M VISIT ADD ON: HCPCS | Performed by: INTERNAL MEDICINE

## 2024-07-08 RX ORDER — METHIMAZOLE 5 MG/1
5 TABLET ORAL DAILY
Qty: 30 TABLET | Refills: 11
Start: 2024-07-08

## 2024-07-08 ASSESSMENT — ENCOUNTER SYMPTOMS
CONSTIPATION: 0
DIARRHEA: 0
TROUBLE SWALLOWING: 0
VOICE CHANGE: 0

## 2024-07-08 NOTE — PROGRESS NOTES
27.53 kg/m²   Wt Readings from Last 3 Encounters:   07/08/24 76.2 kg (168 lb)   04/03/24 77.6 kg (171 lb)   11/30/23 77.1 kg (170 lb)       Physical Exam  HENT:      Head: Normocephalic.   Neck:      Thyroid: No thyroid mass or thyromegaly.   Cardiovascular:      Rate and Rhythm: Normal rate.   Pulmonary:      Effort: No respiratory distress.      Breath sounds: Normal breath sounds.   Neurological:      Mental Status: She is alert.   Psychiatric:         Mood and Affect: Mood normal.         Behavior: Behavior normal.         Orders Placed This Encounter   Procedures    TSH     Standing Status:   Future     Standing Expiration Date:   7/8/2025    T4, Free     Standing Status:   Future     Standing Expiration Date:   7/8/2025    T3     Standing Status:   Future     Standing Expiration Date:   7/8/2025    Hepatic Function Panel     Standing Status:   Future     Standing Expiration Date:   7/8/2025           Current Outpatient Medications   Medication Sig Dispense Refill    methIMAzole (TAPAZOLE) 5 MG tablet Take 1 tablet by mouth daily 30 tablet 11    Azelastine HCl 137 MCG/SPRAY SOLN 1 spray by Nasal route 2 times daily 1 each 2    Lysine HCl 500 MG TABS       omeprazole (PRILOSEC OTC) 20 MG tablet Take 1 tablet by mouth every other day      Cetirizine HCl (ZYRTEC PO) Take by mouth      Multiple Vitamin (DAILY-VITAMIN PO) Take by mouth      Calcium Carbonate-Vitamin D (CALCIUM-VITAMIN D) 600-125 MG-UNIT TABS Take 1 tablet by mouth 2 times daily      psyllium 0.52 g capsule Take 1 capsule by mouth Daily with lunch      triamcinolone (NASACORT) 55 MCG/ACT nasal inhaler 2 sprays by Each Nostril route nightly 3 each 3     Current Facility-Administered Medications   Medication Dose Route Frequency Provider Last Rate Last Admin    methylPREDNISolone acetate (DEPO-MEDROL) injection 160 mg  160 mg Intra-artICUlar Once Wesley Alexander PA J. Kyle Horton, MD, FACE      Portions of this note were generated with the

## 2024-08-24 SDOH — ECONOMIC STABILITY: FOOD INSECURITY: WITHIN THE PAST 12 MONTHS, YOU WORRIED THAT YOUR FOOD WOULD RUN OUT BEFORE YOU GOT MONEY TO BUY MORE.: NEVER TRUE

## 2024-08-24 SDOH — ECONOMIC STABILITY: INCOME INSECURITY: HOW HARD IS IT FOR YOU TO PAY FOR THE VERY BASICS LIKE FOOD, HOUSING, MEDICAL CARE, AND HEATING?: NOT HARD AT ALL

## 2024-08-24 SDOH — ECONOMIC STABILITY: FOOD INSECURITY: WITHIN THE PAST 12 MONTHS, THE FOOD YOU BOUGHT JUST DIDN'T LAST AND YOU DIDN'T HAVE MONEY TO GET MORE.: NEVER TRUE

## 2024-08-24 SDOH — HEALTH STABILITY: PHYSICAL HEALTH: ON AVERAGE, HOW MANY DAYS PER WEEK DO YOU ENGAGE IN MODERATE TO STRENUOUS EXERCISE (LIKE A BRISK WALK)?: 3 DAYS

## 2024-08-24 SDOH — HEALTH STABILITY: PHYSICAL HEALTH: ON AVERAGE, HOW MANY MINUTES DO YOU ENGAGE IN EXERCISE AT THIS LEVEL?: 20 MIN

## 2024-08-24 ASSESSMENT — LIFESTYLE VARIABLES
HOW OFTEN DO YOU HAVE A DRINK CONTAINING ALCOHOL: 3
HOW OFTEN DO YOU HAVE SIX OR MORE DRINKS ON ONE OCCASION: 1
HOW MANY STANDARD DRINKS CONTAINING ALCOHOL DO YOU HAVE ON A TYPICAL DAY: 1
HOW OFTEN DO YOU HAVE A DRINK CONTAINING ALCOHOL: 2-4 TIMES A MONTH
HOW MANY STANDARD DRINKS CONTAINING ALCOHOL DO YOU HAVE ON A TYPICAL DAY: 1 OR 2

## 2024-08-24 ASSESSMENT — PATIENT HEALTH QUESTIONNAIRE - PHQ9
SUM OF ALL RESPONSES TO PHQ QUESTIONS 1-9: 0
SUM OF ALL RESPONSES TO PHQ QUESTIONS 1-9: 0
1. LITTLE INTEREST OR PLEASURE IN DOING THINGS: NOT AT ALL
SUM OF ALL RESPONSES TO PHQ QUESTIONS 1-9: 0
SUM OF ALL RESPONSES TO PHQ QUESTIONS 1-9: 0
2. FEELING DOWN, DEPRESSED OR HOPELESS: NOT AT ALL
SUM OF ALL RESPONSES TO PHQ9 QUESTIONS 1 & 2: 0

## 2024-08-27 ENCOUNTER — TELEMEDICINE (OUTPATIENT)
Dept: INTERNAL MEDICINE CLINIC | Facility: CLINIC | Age: 74
End: 2024-08-27
Payer: MEDICARE

## 2024-08-27 DIAGNOSIS — Z00.00 MEDICARE ANNUAL WELLNESS VISIT, SUBSEQUENT: Primary | ICD-10-CM

## 2024-08-27 PROCEDURE — G0439 PPPS, SUBSEQ VISIT: HCPCS | Performed by: NURSE PRACTITIONER

## 2024-08-27 PROCEDURE — 1123F ACP DISCUSS/DSCN MKR DOCD: CPT | Performed by: NURSE PRACTITIONER

## 2024-08-27 NOTE — PROGRESS NOTES
Multiple Vitamin (DAILY-VITAMIN PO) Take by mouth Yes Provider, MD Carolyn   Calcium Carbonate-Vitamin D (CALCIUM-VITAMIN D) 600-125 MG-UNIT TABS Take 1 tablet by mouth 2 times daily Yes Automatic Reconciliation, Ar   psyllium 0.52 g capsule Take 1 capsule by mouth Daily with lunch Yes Automatic Reconciliation, Ar   triamcinolone (NASACORT) 55 MCG/ACT nasal inhaler 2 sprays by Each Nostril route nightly  Amber Hayes APRN - NP       CareTeam (Including outside providers/suppliers regularly involved in providing care):   Patient Care Team:  Amber Hayes APRN - NP as PCP - General  Amber Hayes APRN - NP as PCP - Empaneled Provider  Amber Hayes APRN - NP as Nurse Practitioner  Wayne Jin MD as Consulting Physician (Cardiology)  Darvin Davis MD (Endocrinology)      Reviewed and updated this visit:  Allergies  Meds  Med Hx  Surg Hx  Soc Hx  Fam Hx      I, Araceli Bowser LPN, 8/27/2024, performed the documented evaluation under the direct supervision of the attending physician.  Kalina Bullock, was evaluated through a synchronous (real-time) audio-video encounter. The patient (or guardian if applicable) is aware that this is a billable service, which includes applicable co-pays. This Virtual Visit was conducted with patient's (and/or legal guardian's) consent. Patient identification was verified, and a caregiver was present when appropriate.   The patient was located at Home: [address] [place] [id number]  Provider was located at Facility (Appt Dept): [address] [place] [address]  Confirm you are appropriately licensed, registered, or certified to deliver care in the state where the patient is located as indicated above. If you are not or unsure, please re-schedule the visit: Yes, I confirm.        BAUTISTA Kearney NP

## 2024-08-27 NOTE — PATIENT INSTRUCTIONS
Hearing Loss: Care Instructions  Overview     Hearing loss is a sudden or slow decrease in how well you hear. It can range from slight to profound. Permanent hearing loss can occur with aging. It also can happen when you are exposed long-term to loud noise. Examples include listening to loud music, riding motorcycles, or being around other loud machines.  Hearing loss can affect your work and home life. It can make you feel lonely or depressed. You may feel that you have lost your independence. But hearing aids and other devices can help you hear better and feel connected to others.  Follow-up care is a key part of your treatment and safety. Be sure to make and go to all appointments, and call your doctor if you are having problems. It's also a good idea to know your test results and keep a list of the medicines you take.  How can you care for yourself at home?  Avoid loud noises whenever possible. This helps keep your hearing from getting worse.  Always wear hearing protection around loud noises.  Wear a hearing aid as directed.  A professional can help you pick a hearing aid that will work best for you.  You can also get hearing aids over the counter for mild to moderate hearing loss.  Have hearing tests as your doctor suggests. They can show whether your hearing has changed. Your hearing aid may need to be adjusted.  Use other devices as needed. These may include:  Telephone amplifiers and hearing aids that can connect to a television, stereo, radio, or microphone.  Devices that use lights or vibrations. These alert you to the doorbell, a ringing telephone, or a baby monitor.  Television closed-captioning. This shows the words at the bottom of the screen. Most new TVs can do this.  TTY (text telephone). This lets you type messages back and forth on the telephone instead of talking or listening. These devices are also called TDD. When messages are typed on the keyboard, they are sent over the phone line to a  you're active every day. Try for at least 30 minutes on most days of the week.     Try to quit or cut back on using tobacco and other nicotine products. This includes smoking and vaping. If you need help quitting, talk to your doctor about stop-smoking programs and medicines. These can increase your chances of quitting for good. Quitting is one of the most important things you can do to protect your heart. It is never too late to quit. Try to avoid secondhand smoke too.     Stay at a weight that's healthy for you. Talk to your doctor if you need help losing weight.     Try to get 7 to 9 hours of sleep each night.     Limit alcohol to 2 drinks a day for men and 1 drink a day for women. Too much alcohol can cause health problems.     Manage other health problems such as diabetes, high blood pressure, and high cholesterol. If you think you may have a problem with alcohol or drug use, talk to your doctor.   Medicines    Take your medicines exactly as prescribed. Call your doctor if you think you are having a problem with your medicine.     If your doctor recommends aspirin, take the amount directed each day. Make sure you take aspirin and not another kind of pain reliever, such as acetaminophen (Tylenol).   When should you call for help?   Call 911 if you have symptoms of a heart attack. These may include:    Chest pain or pressure, or a strange feeling in the chest.     Sweating.     Shortness of breath.     Pain, pressure, or a strange feeling in the back, neck, jaw, or upper belly or in one or both shoulders or arms.     Lightheadedness or sudden weakness.     A fast or irregular heartbeat.   After you call 911, the  may tell you to chew 1 adult-strength or 2 to 4 low-dose aspirin. Wait for an ambulance. Do not try to drive yourself.  Watch closely for changes in your health, and be sure to contact your doctor if you have any problems.  Where can you learn more?  Go to https://www.healthwise.net/patientEd and

## 2024-08-28 ENCOUNTER — HOSPITAL ENCOUNTER (OUTPATIENT)
Dept: LAB | Age: 74
Discharge: HOME | End: 2024-08-31

## 2024-08-28 DIAGNOSIS — E78.00 PURE HYPERCHOLESTEROLEMIA: Primary | Chronic | ICD-10-CM

## 2024-08-28 DIAGNOSIS — D72.819 LEUKOPENIA, UNSPECIFIED TYPE: ICD-10-CM

## 2024-08-29 ENCOUNTER — HOSPITAL ENCOUNTER (OUTPATIENT)
Dept: LAB | Age: 74
Discharge: HOME | End: 2024-09-01

## 2024-08-29 DIAGNOSIS — E78.00 PURE HYPERCHOLESTEROLEMIA: Chronic | ICD-10-CM

## 2024-08-29 DIAGNOSIS — D72.819 LEUKOPENIA, UNSPECIFIED TYPE: ICD-10-CM

## 2024-08-29 LAB
ALBUMIN SERPL-MCNC: 4 G/DL (ref 3.2–4.6)
ALBUMIN/GLOB SERPL: 1.7 (ref 1–1.9)
ALP SERPL-CCNC: 107 U/L (ref 35–104)
ALT SERPL-CCNC: 18 U/L (ref 12–65)
ANION GAP SERPL CALC-SCNC: 9 MMOL/L (ref 9–18)
AST SERPL-CCNC: 26 U/L (ref 15–37)
BASOPHILS # BLD: 0 K/UL (ref 0–0.2)
BASOPHILS NFR BLD: 0 % (ref 0–2)
BILIRUB SERPL-MCNC: 1.2 MG/DL (ref 0–1.2)
BUN SERPL-MCNC: 19 MG/DL (ref 8–23)
CALCIUM SERPL-MCNC: 9.7 MG/DL (ref 8.8–10.2)
CHLORIDE SERPL-SCNC: 103 MMOL/L (ref 98–107)
CHOLEST SERPL-MCNC: 186 MG/DL (ref 0–200)
CO2 SERPL-SCNC: 27 MMOL/L (ref 20–28)
CREAT SERPL-MCNC: 0.94 MG/DL (ref 0.6–1.1)
DIFFERENTIAL METHOD BLD: ABNORMAL
EOSINOPHIL # BLD: 0.1 K/UL (ref 0–0.8)
EOSINOPHIL NFR BLD: 2 % (ref 0.5–7.8)
ERYTHROCYTE [DISTWIDTH] IN BLOOD BY AUTOMATED COUNT: 11.7 % (ref 11.9–14.6)
GLOBULIN SER CALC-MCNC: 2.4 G/DL (ref 2.3–3.5)
GLUCOSE SERPL-MCNC: 104 MG/DL (ref 70–99)
HCT VFR BLD AUTO: 42.5 % (ref 35.8–46.3)
HDLC SERPL-MCNC: 38 MG/DL (ref 40–60)
HDLC SERPL: 4.9 (ref 0–5)
HGB BLD-MCNC: 14 G/DL (ref 11.7–15.4)
IMM GRANULOCYTES # BLD AUTO: 0 K/UL (ref 0–0.5)
IMM GRANULOCYTES NFR BLD AUTO: 0 % (ref 0–5)
LDLC SERPL CALC-MCNC: 108 MG/DL (ref 0–100)
LYMPHOCYTES # BLD: 1.8 K/UL (ref 0.5–4.6)
LYMPHOCYTES NFR BLD: 35 % (ref 13–44)
MCH RBC QN AUTO: 30.3 PG (ref 26.1–32.9)
MCHC RBC AUTO-ENTMCNC: 32.9 G/DL (ref 31.4–35)
MCV RBC AUTO: 92 FL (ref 82–102)
MONOCYTES # BLD: 0.4 K/UL (ref 0.1–1.3)
MONOCYTES NFR BLD: 8 % (ref 4–12)
NEUTS SEG # BLD: 2.9 K/UL (ref 1.7–8.2)
NEUTS SEG NFR BLD: 55 % (ref 43–78)
NRBC # BLD: 0 K/UL (ref 0–0.2)
PLATELET # BLD AUTO: 208 K/UL (ref 150–450)
PMV BLD AUTO: 11.5 FL (ref 9.4–12.3)
POTASSIUM SERPL-SCNC: 4.4 MMOL/L (ref 3.5–5.1)
PROT SERPL-MCNC: 6.4 G/DL (ref 6.3–8.2)
RBC # BLD AUTO: 4.62 M/UL (ref 4.05–5.2)
SODIUM SERPL-SCNC: 139 MMOL/L (ref 136–145)
TRIGL SERPL-MCNC: 199 MG/DL (ref 0–150)
VLDLC SERPL CALC-MCNC: 40 MG/DL (ref 6–23)
WBC # BLD AUTO: 5.2 K/UL (ref 4.3–11.1)

## 2024-09-03 ENCOUNTER — OFFICE VISIT (OUTPATIENT)
Dept: INTERNAL MEDICINE CLINIC | Facility: CLINIC | Age: 74
End: 2024-09-03
Payer: MEDICARE

## 2024-09-03 VITALS
DIASTOLIC BLOOD PRESSURE: 70 MMHG | HEART RATE: 84 BPM | BODY MASS INDEX: 27.2 KG/M2 | WEIGHT: 166 LBS | SYSTOLIC BLOOD PRESSURE: 112 MMHG | OXYGEN SATURATION: 93 %

## 2024-09-03 DIAGNOSIS — G89.29 CHRONIC LEFT SHOULDER PAIN: Primary | ICD-10-CM

## 2024-09-03 DIAGNOSIS — M25.512 CHRONIC LEFT SHOULDER PAIN: Primary | ICD-10-CM

## 2024-09-03 DIAGNOSIS — E05.90 HYPERTHYROIDISM: Chronic | ICD-10-CM

## 2024-09-03 DIAGNOSIS — Z12.31 ENCOUNTER FOR SCREENING MAMMOGRAM FOR MALIGNANT NEOPLASM OF BREAST: ICD-10-CM

## 2024-09-03 DIAGNOSIS — M25.612 DECREASED RANGE OF MOTION OF LEFT SHOULDER: ICD-10-CM

## 2024-09-03 DIAGNOSIS — M85.89 OSTEOPENIA OF MULTIPLE SITES: ICD-10-CM

## 2024-09-03 DIAGNOSIS — Z96.642 H/O TOTAL HIP ARTHROPLASTY, LEFT: ICD-10-CM

## 2024-09-03 PROCEDURE — 99215 OFFICE O/P EST HI 40 MIN: CPT | Performed by: NURSE PRACTITIONER

## 2024-09-03 PROCEDURE — 1123F ACP DISCUSS/DSCN MKR DOCD: CPT | Performed by: NURSE PRACTITIONER

## 2024-09-03 ASSESSMENT — PATIENT HEALTH QUESTIONNAIRE - PHQ9
1. LITTLE INTEREST OR PLEASURE IN DOING THINGS: NOT AT ALL
SUM OF ALL RESPONSES TO PHQ QUESTIONS 1-9: 0
2. FEELING DOWN, DEPRESSED OR HOPELESS: NOT AT ALL
SUM OF ALL RESPONSES TO PHQ9 QUESTIONS 1 & 2: 0
SUM OF ALL RESPONSES TO PHQ QUESTIONS 1-9: 0

## 2024-09-09 ENCOUNTER — OFFICE VISIT (OUTPATIENT)
Dept: AUDIOLOGY | Age: 74
End: 2024-09-09
Payer: MEDICARE

## 2024-09-09 ENCOUNTER — OFFICE VISIT (OUTPATIENT)
Dept: ENT CLINIC | Age: 74
End: 2024-09-09
Payer: MEDICARE

## 2024-09-09 VITALS
WEIGHT: 163.8 LBS | DIASTOLIC BLOOD PRESSURE: 62 MMHG | HEIGHT: 66 IN | SYSTOLIC BLOOD PRESSURE: 110 MMHG | BODY MASS INDEX: 26.33 KG/M2

## 2024-09-09 DIAGNOSIS — H93.13 TINNITUS OF BOTH EARS: Chronic | ICD-10-CM

## 2024-09-09 DIAGNOSIS — H90.3 SENSORINEURAL HEARING LOSS (SNHL) OF BOTH EARS: Primary | Chronic | ICD-10-CM

## 2024-09-09 DIAGNOSIS — H90.3 SENSORINEURAL HEARING LOSS, BILATERAL: Primary | ICD-10-CM

## 2024-09-09 PROCEDURE — 92557 COMPREHENSIVE HEARING TEST: CPT | Performed by: AUDIOLOGIST

## 2024-09-09 PROCEDURE — 99214 OFFICE O/P EST MOD 30 MIN: CPT | Performed by: PHYSICIAN ASSISTANT

## 2024-09-09 PROCEDURE — 1123F ACP DISCUSS/DSCN MKR DOCD: CPT | Performed by: PHYSICIAN ASSISTANT

## 2024-09-09 PROCEDURE — 92567 TYMPANOMETRY: CPT | Performed by: AUDIOLOGIST

## 2024-09-09 RX ORDER — ESTRADIOL 0.1 MG/G
1 CREAM VAGINAL
COMMUNITY
Start: 2024-08-28

## 2024-09-09 ASSESSMENT — ENCOUNTER SYMPTOMS
RESPIRATORY NEGATIVE: 1
EYES NEGATIVE: 1
ALLERGIC/IMMUNOLOGIC NEGATIVE: 1
GASTROINTESTINAL NEGATIVE: 1

## 2024-10-01 ENCOUNTER — OFFICE VISIT (OUTPATIENT)
Dept: INTERNAL MEDICINE CLINIC | Facility: CLINIC | Age: 74
End: 2024-10-01
Payer: MEDICARE

## 2024-10-01 VITALS
WEIGHT: 160.8 LBS | BODY MASS INDEX: 26.35 KG/M2 | SYSTOLIC BLOOD PRESSURE: 116 MMHG | HEART RATE: 97 BPM | OXYGEN SATURATION: 97 % | DIASTOLIC BLOOD PRESSURE: 60 MMHG

## 2024-10-01 DIAGNOSIS — R23.2 HOT FLASHES: ICD-10-CM

## 2024-10-01 DIAGNOSIS — R30.0 DYSURIA: Primary | ICD-10-CM

## 2024-10-01 LAB
BILIRUBIN, URINE, POC: NEGATIVE
BLOOD URINE, POC: ABNORMAL
GLUCOSE URINE, POC: NEGATIVE
KETONES, URINE, POC: NEGATIVE
LEUKOCYTE ESTERASE, URINE, POC: ABNORMAL
NITRITE, URINE, POC: NEGATIVE
PH, URINE, POC: 5 (ref 4.6–8)
PROTEIN,URINE, POC: NEGATIVE
SPECIFIC GRAVITY, URINE, POC: 1.01 (ref 1–1.03)
URINALYSIS CLARITY, POC: CLEAR
URINALYSIS COLOR, POC: YELLOW
UROBILINOGEN, POC: NORMAL

## 2024-10-01 PROCEDURE — 81003 URINALYSIS AUTO W/O SCOPE: CPT | Performed by: INTERNAL MEDICINE

## 2024-10-01 PROCEDURE — 99213 OFFICE O/P EST LOW 20 MIN: CPT | Performed by: INTERNAL MEDICINE

## 2024-10-01 PROCEDURE — 1123F ACP DISCUSS/DSCN MKR DOCD: CPT | Performed by: INTERNAL MEDICINE

## 2024-10-01 RX ORDER — CIPROFLOXACIN 250 MG/1
250 TABLET, FILM COATED ORAL 2 TIMES DAILY
Qty: 6 TABLET | Refills: 0 | Status: SHIPPED | OUTPATIENT
Start: 2024-10-01 | End: 2024-10-04 | Stop reason: SDUPTHER

## 2024-10-01 NOTE — PROGRESS NOTES
total right hip arthroplasty    Leg length discrepancy    DAPHNIE (obstructive sleep apnea)    Fatigue    Precordial pain    Osteopenia of multiple sites    Dysuria         Review of Systems   Constitutional:  Negative for chills and fever.   Genitourinary:  Positive for frequency. Negative for hematuria.        Hot flashes, meds being tried by gyn         OBJECTIVE:  /60 (Site: Right Upper Arm, Position: Sitting, Cuff Size: Small Adult)   Pulse 97   Wt 72.9 kg (160 lb 12.8 oz)   SpO2 97%   BMI 26.35 kg/m²      Physical Exam  Constitutional:       Appearance: Normal appearance.   Neurological:      Mental Status: She is alert.   Psychiatric:         Mood and Affect: Mood normal.         Behavior: Behavior normal.        Medical problems and test results were reviewed with the patient today.     Recent Results (from the past 672 hour(s))   AMB POC URINALYSIS DIP STICK AUTO W/O MICRO    Collection Time: 10/01/24  3:27 PM   Result Value Ref Range    Color, Urine, POC Yellow     Clarity, Urine, POC Clear     Glucose, Urine, POC Negative     Bilirubin, Urine, POC Negative     Ketones, Urine, POC Negative     Specific Gravity, Urine, POC 1.015 1.001 - 1.035    Blood, Urine, POC Trace Negative    pH, Urine, POC 5.0 4.6 - 8.0    Protein, Urine, POC Negative     Urobilinogen, POC Normal     Nitrite, Urine, POC Negative     Leukocyte Esterase, Urine, POC Trace          ASSESSMENT and PLAN    1. Dysuria  -     AMB POC URINALYSIS DIP STICK AUTO W/O MICRO  -     Culture, Urine; Future  -     ciprofloxacin (CIPRO) 250 MG tablet; Take 1 tablet by mouth 2 times daily for 3 days, Disp-6 tablet, R-0Normal     Can try Azo and will treat with Cipro for 3 days.  She has rare episodes of dysuria so will treat and check culture. Only antibiotic allergy is Sulfa      No follow-ups on file.

## 2024-10-02 DIAGNOSIS — R30.0 DYSURIA: ICD-10-CM

## 2024-10-04 DIAGNOSIS — R30.0 DYSURIA: ICD-10-CM

## 2024-10-04 LAB
BACTERIA SPEC CULT: ABNORMAL
SERVICE CMNT-IMP: ABNORMAL

## 2024-10-04 RX ORDER — CIPROFLOXACIN 250 MG/1
250 TABLET, FILM COATED ORAL 2 TIMES DAILY
Qty: 6 TABLET | Refills: 0 | Status: SHIPPED | OUTPATIENT
Start: 2024-10-04 | End: 2024-10-07

## 2024-10-04 NOTE — TELEPHONE ENCOUNTER
Per Dr. Samano, urine culture came back and cipro was correct abx, but would like pt to take for 3 more days. Rx pended. Can you send for Dr. Samano?    Pt verbalized understanding.

## 2024-10-05 ENCOUNTER — HOSPITAL ENCOUNTER (OUTPATIENT)
Dept: MAMMOGRAPHY | Age: 74
Discharge: HOME | End: 2024-10-08
Payer: MEDICARE

## 2024-10-05 DIAGNOSIS — M85.89 OSTEOPENIA OF MULTIPLE SITES: ICD-10-CM

## 2024-10-05 PROCEDURE — 77080 DXA BONE DENSITY AXIAL: CPT

## 2024-10-08 ENCOUNTER — OFFICE VISIT (OUTPATIENT)
Dept: INTERNAL MEDICINE CLINIC | Facility: CLINIC | Age: 74
End: 2024-10-08
Payer: MEDICARE

## 2024-10-08 VITALS
OXYGEN SATURATION: 98 % | HEART RATE: 77 BPM | DIASTOLIC BLOOD PRESSURE: 72 MMHG | BODY MASS INDEX: 26.45 KG/M2 | SYSTOLIC BLOOD PRESSURE: 120 MMHG | WEIGHT: 161.4 LBS

## 2024-10-08 DIAGNOSIS — R30.0 DYSURIA: ICD-10-CM

## 2024-10-08 DIAGNOSIS — R35.0 URINE FREQUENCY: ICD-10-CM

## 2024-10-08 DIAGNOSIS — N30.01 ACUTE CYSTITIS WITH HEMATURIA: Primary | ICD-10-CM

## 2024-10-08 LAB
BILIRUBIN, URINE, POC: NEGATIVE
BLOOD URINE, POC: ABNORMAL
GLUCOSE URINE, POC: NEGATIVE
KETONES, URINE, POC: NEGATIVE
LEUKOCYTE ESTERASE, URINE, POC: ABNORMAL
NITRITE, URINE, POC: NEGATIVE
PH, URINE, POC: 5 (ref 4.6–8)
PROTEIN,URINE, POC: ABNORMAL
SPECIFIC GRAVITY, URINE, POC: 1.02 (ref 1–1.03)
URINALYSIS CLARITY, POC: ABNORMAL
URINALYSIS COLOR, POC: ABNORMAL
UROBILINOGEN, POC: ABNORMAL

## 2024-10-08 PROCEDURE — 99203 OFFICE O/P NEW LOW 30 MIN: CPT | Performed by: FAMILY MEDICINE

## 2024-10-08 PROCEDURE — 1123F ACP DISCUSS/DSCN MKR DOCD: CPT | Performed by: FAMILY MEDICINE

## 2024-10-08 PROCEDURE — 81003 URINALYSIS AUTO W/O SCOPE: CPT | Performed by: FAMILY MEDICINE

## 2024-10-08 RX ORDER — PAROXETINE 10 MG/1
10 TABLET, FILM COATED ORAL EVERY MORNING
COMMUNITY

## 2024-10-08 NOTE — ASSESSMENT & PLAN NOTE
Urinalysis shows WBC and RBC consistent with urinary tract infection    Orders:    AMB POC URINALYSIS DIP STICK AUTO W/O MICRO

## 2024-10-08 NOTE — PROGRESS NOTES
Kalina Bullock (:  1950) is a 74 y.o. female,Established patient, here for evaluation of the following chief complaint(s):  Urinary Tract Infection (Pressure and burning with urination x 24 /Patient reports taking last Cipro this am )         Assessment & Plan  Dysuria    Urinalysis shows WBC and RBC consistent with urinary tract infection    Orders:    AMB POC URINALYSIS DIP STICK AUTO W/O MICRO    Urine frequency    Urinalysis consistent with urinary tract infection         Acute cystitis with hematuria    Urinalysis consistent with urinary tract infection.  Will send for urine culture.  Will treat on Augmentin based on previous urine culture.  Patient does not desire any medications for burning.    Orders:    Culture, Urine; Future    amoxicillin-clavulanate (AUGMENTIN) 875-125 MG per tablet; Take 1 tablet by mouth 2 times daily for 7 days      Return if symptoms worsen or fail to improve.       Subjective   74-year-old female patient of Amber Hayes who comes in because of a urinary tract infection.  Was treated for a UTI on 2024 with Cipro.  Took her last Cipro this morning.  Urine culture grew E. coli which was sensitive to Cipro. Continues to have symptoms. Its better. Some dysuria. + tenderness in vaginal area. No vaginal area. No fever or chills. No back pain or abdominal pain. Urine looked cloudy. + frequency.         Review of Systems       Objective   Physical Exam  Constitutional:       Appearance: Normal appearance.   HENT:      Head: Normocephalic.   Cardiovascular:      Rate and Rhythm: Normal rate and regular rhythm.      Heart sounds: Normal heart sounds.   Pulmonary:      Effort: Pulmonary effort is normal.      Breath sounds: Normal breath sounds.   Abdominal:      General: Abdomen is flat. Bowel sounds are normal.      Palpations: Abdomen is soft.      Tenderness: There is no abdominal tenderness. There is no right CVA tenderness, left CVA tenderness, guarding or rebound.

## 2024-10-10 LAB
BACTERIA SPEC CULT: ABNORMAL
SERVICE CMNT-IMP: ABNORMAL

## 2024-10-11 ENCOUNTER — TELEPHONE (OUTPATIENT)
Dept: PRIMARY CARE CLINIC | Facility: CLINIC | Age: 74
End: 2024-10-11

## 2024-10-11 DIAGNOSIS — N30.00 ACUTE CYSTITIS WITHOUT HEMATURIA: Primary | ICD-10-CM

## 2024-10-11 RX ORDER — NITROFURANTOIN 25; 75 MG/1; MG/1
100 CAPSULE ORAL 2 TIMES DAILY
Qty: 14 CAPSULE | Refills: 0 | Status: SHIPPED | OUTPATIENT
Start: 2024-10-11 | End: 2024-10-18

## 2024-10-14 ENCOUNTER — TELEPHONE (OUTPATIENT)
Dept: PRIMARY CARE CLINIC | Facility: CLINIC | Age: 74
End: 2024-10-14

## 2024-10-14 NOTE — TELEPHONE ENCOUNTER
Pharmacy wanted to know if the Macrobid was suppose to be cancelled. No message on a cancellation.

## 2024-11-15 ENCOUNTER — TELEPHONE (OUTPATIENT)
Dept: INTERNAL MEDICINE CLINIC | Facility: CLINIC | Age: 74
End: 2024-11-15

## 2024-11-15 DIAGNOSIS — E05.90 HYPERTHYROIDISM: ICD-10-CM

## 2024-11-15 LAB
ALBUMIN SERPL-MCNC: 3.9 G/DL (ref 3.2–4.6)
ALBUMIN/GLOB SERPL: 1.4 (ref 1–1.9)
ALP SERPL-CCNC: 103 U/L (ref 35–104)
ALT SERPL-CCNC: 30 U/L (ref 8–45)
AST SERPL-CCNC: 32 U/L (ref 15–37)
BILIRUB DIRECT SERPL-MCNC: <0.2 MG/DL (ref 0–0.4)
BILIRUB SERPL-MCNC: 1 MG/DL (ref 0–1.2)
GLOBULIN SER CALC-MCNC: 2.7 G/DL (ref 2.3–3.5)
PROT SERPL-MCNC: 6.6 G/DL (ref 6.3–8.2)
T3 SERPL-MCNC: 1.21 NG/ML (ref 0.6–1.81)
T4 FREE SERPL-MCNC: 1.6 NG/DL (ref 0.9–1.7)
TSH, 3RD GENERATION: 0.01 UIU/ML (ref 0.27–4.2)

## 2024-11-15 NOTE — TELEPHONE ENCOUNTER
Called pt to see if she was having any new or increased pain and she said no she wants to see of Prolia would be good for her osteoporosis since she was on it before. I adv pt that she would need to discuss medication changes or adjustments with her new PCP. Provided FAP line and pt expressed understanding

## 2024-11-18 ASSESSMENT — ENCOUNTER SYMPTOMS
VOICE CHANGE: 0
DIARRHEA: 0
CONSTIPATION: 0

## 2024-11-18 NOTE — PROGRESS NOTES
JESSICA Davis MD, FACE    [facility] ENDOCRINOLOGY   AND   THYROID NODULE CLINIC            Reason for visit: Follow-up of hyperthyroidism      ASSESSMENT AND PLAN:    1. Hyperthyroidism  She is biochemically euthyroid.  Continue methimazole as prescribed.  I will provide her with longitudinal care for this problem.  - methIMAzole (TAPAZOLE) 5 MG tablet; Take 1 tablet by mouth daily  Dispense: 90 tablet; Refill: 3  - TSH; Future  - T4, Free; Future  - T3; Future  - Hepatic Function Panel; Future    2. Thyroid nodule  Per ACR criteria, ongoing ultrasound follow-up is not necessary.    3. Postmenopausal osteoporosis  She previously received Prolia but is overdue for her next dose.  Her prior primary care provider (Amber Hayes NP) is no longer with our system.  I will arrange her next Prolia dose now.  - Comprehensive Metabolic Panel; Future  - Vitamin D 25 Hydroxy; Future  - Vitamin D 25 Hydroxy; Future  - Comprehensive Metabolic Panel; Future      Follow-up and Dispositions    Return in about 6 months (around 5/19/2025).                     History of Present Illness:    THYROID DYSFUNCTION  Eve Bullock is seen for follow-up of hyperthyroidism; this was diagnosed in July 2019.       Current symptoms: See review of systems below     Prior treatment: Methimazole 5 mg daily was started 10/14/2019.  Her dose has been adjusted as follows:  -2.5 mg daily 12/17/2019.  -2.5 mg daily 3 days/week 1/31/2023  -2.5 mg daily 8/23/2023  -5 mg daily 10/3/2023     Pertinent labs:  6/26/2015: TSH 3.390, free T4 1.30.  7/1/2016: TSH 0.830, free T4 1.30.  7/6/2017: TSH 1.960, free T4 1.17.  7/19/2018: TSH 1.830, free T4 1.24.  7/30/2019: TSH 0.028, free T4 1.71.  8/23/2019: TSH 0.033, free T4 1.60, free T3 2.7.  10/14/2019: TSH 0.090, free T4 1.52, T3 109, thyrotropin receptor antibodies less than 1.10 (-).  12/16/2019: TSH 3.150, free T4 1.04, T3 88.  3/16/2020: TSH 3.410, free T4 1.11, T3 102.  7/20/2020: TSH 2.740,

## 2024-11-19 ENCOUNTER — OFFICE VISIT (OUTPATIENT)
Dept: ENDOCRINOLOGY | Age: 74
End: 2024-11-19
Payer: MEDICARE

## 2024-11-19 VITALS — SYSTOLIC BLOOD PRESSURE: 110 MMHG | WEIGHT: 154 LBS | DIASTOLIC BLOOD PRESSURE: 68 MMHG | BODY MASS INDEX: 25.24 KG/M2

## 2024-11-19 DIAGNOSIS — E05.90 HYPERTHYROIDISM: Primary | ICD-10-CM

## 2024-11-19 DIAGNOSIS — M81.0 POSTMENOPAUSAL OSTEOPOROSIS: ICD-10-CM

## 2024-11-19 DIAGNOSIS — E04.1 THYROID NODULE: ICD-10-CM

## 2024-11-19 LAB
25(OH)D3 SERPL-MCNC: 60.6 NG/ML (ref 30–100)
ALBUMIN SERPL-MCNC: 4.1 G/DL (ref 3.2–4.6)
ALBUMIN/GLOB SERPL: 1.5 (ref 1–1.9)
ALP SERPL-CCNC: 112 U/L (ref 35–104)
ALT SERPL-CCNC: 33 U/L (ref 8–45)
ANION GAP SERPL CALC-SCNC: 10 MMOL/L (ref 7–16)
AST SERPL-CCNC: 31 U/L (ref 15–37)
BILIRUB SERPL-MCNC: 1.1 MG/DL (ref 0–1.2)
BUN SERPL-MCNC: 15 MG/DL (ref 8–23)
CALCIUM SERPL-MCNC: 9.7 MG/DL (ref 8.8–10.2)
CHLORIDE SERPL-SCNC: 102 MMOL/L (ref 98–107)
CO2 SERPL-SCNC: 28 MMOL/L (ref 20–29)
CREAT SERPL-MCNC: 0.91 MG/DL (ref 0.6–1.1)
GLOBULIN SER CALC-MCNC: 2.7 G/DL (ref 2.3–3.5)
GLUCOSE SERPL-MCNC: 87 MG/DL (ref 70–99)
POTASSIUM SERPL-SCNC: 4.5 MMOL/L (ref 3.5–5.1)
PROT SERPL-MCNC: 6.7 G/DL (ref 6.3–8.2)
SODIUM SERPL-SCNC: 140 MMOL/L (ref 136–145)

## 2024-11-19 PROCEDURE — G2211 COMPLEX E/M VISIT ADD ON: HCPCS | Performed by: INTERNAL MEDICINE

## 2024-11-19 PROCEDURE — 99214 OFFICE O/P EST MOD 30 MIN: CPT | Performed by: INTERNAL MEDICINE

## 2024-11-19 PROCEDURE — 1159F MED LIST DOCD IN RCRD: CPT | Performed by: INTERNAL MEDICINE

## 2024-11-19 PROCEDURE — 1123F ACP DISCUSS/DSCN MKR DOCD: CPT | Performed by: INTERNAL MEDICINE

## 2024-11-19 RX ORDER — SODIUM CHLORIDE 9 MG/ML
INJECTION, SOLUTION INTRAVENOUS CONTINUOUS
OUTPATIENT
Start: 2024-11-22

## 2024-11-19 RX ORDER — ALBUTEROL SULFATE 90 UG/1
4 INHALANT RESPIRATORY (INHALATION) PRN
OUTPATIENT
Start: 2024-11-22

## 2024-11-19 RX ORDER — FAMOTIDINE 10 MG/ML
20 INJECTION, SOLUTION INTRAVENOUS
OUTPATIENT
Start: 2024-11-22

## 2024-11-19 RX ORDER — METHIMAZOLE 5 MG/1
5 TABLET ORAL DAILY
Qty: 90 TABLET | Refills: 3 | Status: SHIPPED | OUTPATIENT
Start: 2024-11-19

## 2024-11-19 RX ORDER — DIPHENHYDRAMINE HYDROCHLORIDE 50 MG/ML
50 INJECTION INTRAMUSCULAR; INTRAVENOUS
OUTPATIENT
Start: 2024-11-22

## 2024-11-19 RX ORDER — ONDANSETRON 2 MG/ML
8 INJECTION INTRAMUSCULAR; INTRAVENOUS
OUTPATIENT
Start: 2024-11-22

## 2024-11-19 RX ORDER — HYDROCORTISONE SODIUM SUCCINATE 100 MG/2ML
100 INJECTION INTRAMUSCULAR; INTRAVENOUS
OUTPATIENT
Start: 2024-11-22

## 2024-11-19 RX ORDER — ACETAMINOPHEN 325 MG/1
650 TABLET ORAL
OUTPATIENT
Start: 2024-11-22

## 2024-11-19 RX ORDER — EPINEPHRINE 1 MG/ML
0.3 INJECTION, SOLUTION, CONCENTRATE INTRAVENOUS PRN
OUTPATIENT
Start: 2024-11-22

## 2024-11-19 ASSESSMENT — ENCOUNTER SYMPTOMS
SHORTNESS OF BREATH: 0
TROUBLE SWALLOWING: 1

## 2024-12-09 ENCOUNTER — OFFICE VISIT (OUTPATIENT)
Dept: INTERNAL MEDICINE CLINIC | Facility: CLINIC | Age: 74
End: 2024-12-09
Payer: MEDICARE

## 2024-12-09 VITALS
HEIGHT: 66 IN | DIASTOLIC BLOOD PRESSURE: 70 MMHG | RESPIRATION RATE: 16 BRPM | TEMPERATURE: 97.2 F | OXYGEN SATURATION: 99 % | BODY MASS INDEX: 25.75 KG/M2 | WEIGHT: 160.2 LBS | SYSTOLIC BLOOD PRESSURE: 115 MMHG | HEART RATE: 81 BPM

## 2024-12-09 DIAGNOSIS — H02.403 PTOSIS OF BOTH EYELIDS: Primary | ICD-10-CM

## 2024-12-09 PROCEDURE — 1160F RVW MEDS BY RX/DR IN RCRD: CPT | Performed by: FAMILY MEDICINE

## 2024-12-09 PROCEDURE — 1123F ACP DISCUSS/DSCN MKR DOCD: CPT | Performed by: FAMILY MEDICINE

## 2024-12-09 PROCEDURE — 1126F AMNT PAIN NOTED NONE PRSNT: CPT | Performed by: FAMILY MEDICINE

## 2024-12-09 PROCEDURE — 99214 OFFICE O/P EST MOD 30 MIN: CPT | Performed by: FAMILY MEDICINE

## 2024-12-09 PROCEDURE — 1159F MED LIST DOCD IN RCRD: CPT | Performed by: FAMILY MEDICINE

## 2024-12-09 ASSESSMENT — ENCOUNTER SYMPTOMS
SHORTNESS OF BREATH: 0
BLOOD IN STOOL: 0
ABDOMINAL PAIN: 0

## 2024-12-09 ASSESSMENT — PATIENT HEALTH QUESTIONNAIRE - PHQ9
SUM OF ALL RESPONSES TO PHQ QUESTIONS 1-9: 0
SUM OF ALL RESPONSES TO PHQ QUESTIONS 1-9: 0
1. LITTLE INTEREST OR PLEASURE IN DOING THINGS: NOT AT ALL
2. FEELING DOWN, DEPRESSED OR HOPELESS: NOT AT ALL
SUM OF ALL RESPONSES TO PHQ QUESTIONS 1-9: 0
SUM OF ALL RESPONSES TO PHQ9 QUESTIONS 1 & 2: 0
SUM OF ALL RESPONSES TO PHQ QUESTIONS 1-9: 0

## 2024-12-09 NOTE — PROGRESS NOTES
12/9/2024     Subjective:     Chief Complaint   Patient presents with    Follow-up     Need Risk Assessment clearance form  filled out by physician for Blepharoplasty bilateral eyes lids requested by [facility]. Amber Hayes        HPI:    Needs surgical clearance for planned blepharoplasty.    Otherwise healthy. Taking methimazole for Hyperthyroid. Omeprazole for GERD. Just started estradiol patches.        Interim History: see above     Review of Systems   Constitutional:  Negative for fatigue.   HENT:  Negative for congestion.    Respiratory:  Negative for shortness of breath.    Cardiovascular:  Negative for chest pain.   Gastrointestinal:  Negative for abdominal pain and blood in stool.   Genitourinary:  Negative for difficulty urinating.   Skin:  Negative for rash.   Neurological:  Negative for headaches.         Past Medical History:   Diagnosis Date    Anxiety disorder     Chronic rhinitis     COVID-19 02/2024    Dysfunction of eustachian tube     Gastroesophageal reflux disease     Hyperlipidemia     Hyperthyroidism     Mild mitral regurgitation     Osteoarthritis     Osteopenia     Seasonal allergic rhinitis     Tinnitus      Past Surgical History:   Procedure Laterality Date    COLONOSCOPY  01/23/2012    diverticulosis, internal hemorrhoids (Dr. Whiting)    HYSTERECTOMY, TOTAL ABDOMINAL (CERVIX REMOVED)  2005    HYSTERECTOMY, VAGINAL  2005    JOINT REPLACEMENT  11/2016 & 4/2018    Both hips have been replaced    RAY AND BSO (CERVIX REMOVED)  2003    TONSILLECTOMY  childhood    TOTAL HIP ARTHROPLASTY Right 11/2016    TOTAL HIP ARTHROPLASTY Left 04/2018    UPPER GASTROINTESTINAL ENDOSCOPY  2008     Family History   Problem Relation Age of Onset    Breast Cancer Mother 40    Thyroid Disease Mother         treated with thyroidectomy    Cancer Mother     No Known Problems Daughter     No Known Problems Father     Mult Sclerosis Brother     No Known Problems Brother     No Known Problems Daughter     No

## 2024-12-12 RX ORDER — AZELASTINE HYDROCHLORIDE 137 UG/1
1 SPRAY, METERED NASAL 2 TIMES DAILY
Qty: 1 EACH | Refills: 2 | Status: SHIPPED | OUTPATIENT
Start: 2024-12-12

## 2024-12-16 ENCOUNTER — HOSPITAL ENCOUNTER (OUTPATIENT)
Dept: MAMMOGRAPHY | Age: 74
Discharge: HOME | End: 2024-12-19
Payer: MEDICARE

## 2024-12-16 DIAGNOSIS — Z12.31 ENCOUNTER FOR SCREENING MAMMOGRAM FOR MALIGNANT NEOPLASM OF BREAST: ICD-10-CM

## 2024-12-16 PROCEDURE — 77063 BREAST TOMOSYNTHESIS BI: CPT

## 2025-01-14 ENCOUNTER — NURSE ONLY (OUTPATIENT)
Age: 75
End: 2025-01-14
Payer: MEDICARE

## 2025-01-14 VITALS
SYSTOLIC BLOOD PRESSURE: 128 MMHG | OXYGEN SATURATION: 95 % | DIASTOLIC BLOOD PRESSURE: 77 MMHG | TEMPERATURE: 97.7 F | HEART RATE: 73 BPM | RESPIRATION RATE: 16 BRPM

## 2025-01-14 DIAGNOSIS — M81.0 POSTMENOPAUSAL OSTEOPOROSIS: Primary | ICD-10-CM

## 2025-01-14 PROCEDURE — 96372 THER/PROPH/DIAG INJ SC/IM: CPT | Performed by: PSYCHIATRY & NEUROLOGY

## 2025-01-14 RX ORDER — HYDROCORTISONE SODIUM SUCCINATE 100 MG/2ML
100 INJECTION INTRAMUSCULAR; INTRAVENOUS
OUTPATIENT
Start: 2025-07-13

## 2025-01-14 RX ORDER — ONDANSETRON 2 MG/ML
8 INJECTION INTRAMUSCULAR; INTRAVENOUS
OUTPATIENT
Start: 2025-07-13

## 2025-01-14 RX ORDER — EPINEPHRINE 1 MG/ML
0.3 INJECTION, SOLUTION, CONCENTRATE INTRAVENOUS PRN
OUTPATIENT
Start: 2025-07-13

## 2025-01-14 RX ORDER — ALBUTEROL SULFATE 90 UG/1
4 INHALANT RESPIRATORY (INHALATION) PRN
OUTPATIENT
Start: 2025-07-13

## 2025-01-14 RX ORDER — DIPHENHYDRAMINE HYDROCHLORIDE 50 MG/ML
50 INJECTION INTRAMUSCULAR; INTRAVENOUS
OUTPATIENT
Start: 2025-07-13

## 2025-01-14 RX ORDER — ACETAMINOPHEN 325 MG/1
650 TABLET ORAL
OUTPATIENT
Start: 2025-07-13

## 2025-01-14 RX ORDER — SODIUM CHLORIDE 9 MG/ML
INJECTION, SOLUTION INTRAVENOUS CONTINUOUS
OUTPATIENT
Start: 2025-07-13

## 2025-01-14 NOTE — PROGRESS NOTES
[facility]  [address], [address] [place] [address]  Office : [phone], Fax: [phone]        Osteoporosis pre-infusion/injection questionnaire:     1. In the past month, have you had or are you planning to have any dental surgery or major dental procedures?  No     2. Are you taking a calcium and vitamin D supplement? Yes     3. In the last year, have you had any fractures? No        Patient arrived ambulatory to infusion suite today for a repeat. Prolia subcutaneous injection.  Pertinent labs drawn 11/19 provider okayed . Labs reviewed and are within medication administration parameters.  Prolia 60mg/ml injected SC into right arm. Patient tolerated injection well.    Advised patient to continue with calcium and vitamin D supplements post injection. Advised patient to call the ordering provider with any problems post injection.       Next Prolia appt scheduled 07/2025 prior to departure from infusion suite.     Pt ambulatory with steady gait out of infusion suite.

## 2025-03-10 SDOH — HEALTH STABILITY: PHYSICAL HEALTH: ON AVERAGE, HOW MANY DAYS PER WEEK DO YOU ENGAGE IN MODERATE TO STRENUOUS EXERCISE (LIKE A BRISK WALK)?: 2 DAYS

## 2025-03-10 SDOH — HEALTH STABILITY: PHYSICAL HEALTH: ON AVERAGE, HOW MANY MINUTES DO YOU ENGAGE IN EXERCISE AT THIS LEVEL?: 20 MIN

## 2025-03-13 ENCOUNTER — OFFICE VISIT (OUTPATIENT)
Dept: INTERNAL MEDICINE CLINIC | Facility: CLINIC | Age: 75
End: 2025-03-13
Payer: MEDICARE

## 2025-03-13 VITALS
HEART RATE: 83 BPM | HEIGHT: 65 IN | SYSTOLIC BLOOD PRESSURE: 120 MMHG | BODY MASS INDEX: 26.19 KG/M2 | WEIGHT: 157.2 LBS | OXYGEN SATURATION: 97 % | DIASTOLIC BLOOD PRESSURE: 60 MMHG | TEMPERATURE: 98 F

## 2025-03-13 DIAGNOSIS — Z76.89 ENCOUNTER TO ESTABLISH CARE WITH NEW DOCTOR: Primary | ICD-10-CM

## 2025-03-13 DIAGNOSIS — J30.1 SEASONAL ALLERGIC RHINITIS DUE TO POLLEN: Chronic | ICD-10-CM

## 2025-03-13 DIAGNOSIS — E05.90 HYPERTHYROIDISM: Chronic | ICD-10-CM

## 2025-03-13 DIAGNOSIS — E78.2 MIXED HYPERLIPIDEMIA: ICD-10-CM

## 2025-03-13 DIAGNOSIS — M12.812 ROTATOR CUFF ARTHROPATHY OF LEFT SHOULDER: ICD-10-CM

## 2025-03-13 DIAGNOSIS — R79.9 ABNORMAL BLOOD CHEMISTRY: ICD-10-CM

## 2025-03-13 DIAGNOSIS — M81.0 POSTMENOPAUSAL OSTEOPOROSIS: ICD-10-CM

## 2025-03-13 DIAGNOSIS — K21.9 GASTROESOPHAGEAL REFLUX DISEASE WITHOUT ESOPHAGITIS: ICD-10-CM

## 2025-03-13 PROBLEM — R23.2 HOT FLASHES: Status: RESOLVED | Noted: 2024-10-01 | Resolved: 2025-03-13

## 2025-03-13 PROBLEM — R09.02 HYPOXIA: Status: RESOLVED | Noted: 2018-06-15 | Resolved: 2025-03-13

## 2025-03-13 PROBLEM — D64.9 ANEMIA: Status: RESOLVED | Noted: 2018-06-15 | Resolved: 2025-03-13

## 2025-03-13 PROBLEM — M85.89 OSTEOPENIA OF MULTIPLE SITES: Status: RESOLVED | Noted: 2022-09-24 | Resolved: 2025-03-13

## 2025-03-13 PROBLEM — R30.0 DYSURIA: Status: RESOLVED | Noted: 2024-10-01 | Resolved: 2025-03-13

## 2025-03-13 PROBLEM — R00.2 PALPITATIONS: Status: RESOLVED | Noted: 2019-10-14 | Resolved: 2025-03-13

## 2025-03-13 PROBLEM — R07.2 PRECORDIAL PAIN: Status: RESOLVED | Noted: 2022-06-28 | Resolved: 2025-03-13

## 2025-03-13 PROBLEM — M21.70 LEG LENGTH DISCREPANCY: Status: RESOLVED | Noted: 2021-05-10 | Resolved: 2025-03-13

## 2025-03-13 PROBLEM — G47.33 OSA (OBSTRUCTIVE SLEEP APNEA): Status: RESOLVED | Noted: 2018-06-15 | Resolved: 2025-03-13

## 2025-03-13 PROCEDURE — 1123F ACP DISCUSS/DSCN MKR DOCD: CPT | Performed by: INTERNAL MEDICINE

## 2025-03-13 PROCEDURE — 99214 OFFICE O/P EST MOD 30 MIN: CPT | Performed by: INTERNAL MEDICINE

## 2025-03-13 PROCEDURE — 1159F MED LIST DOCD IN RCRD: CPT | Performed by: INTERNAL MEDICINE

## 2025-03-13 PROCEDURE — 1126F AMNT PAIN NOTED NONE PRSNT: CPT | Performed by: INTERNAL MEDICINE

## 2025-03-13 PROCEDURE — 1160F RVW MEDS BY RX/DR IN RCRD: CPT | Performed by: INTERNAL MEDICINE

## 2025-03-13 RX ORDER — ESTRADIOL 0.03 MG/D
1 PATCH TRANSDERMAL WEEKLY
COMMUNITY
Start: 2025-02-09

## 2025-03-13 ASSESSMENT — ENCOUNTER SYMPTOMS
SHORTNESS OF BREATH: 0
SINUS PRESSURE: 0
DIARRHEA: 0
RHINORRHEA: 0
ABDOMINAL PAIN: 0
CHEST TIGHTNESS: 0
NAUSEA: 0
BLOOD IN STOOL: 0
VOMITING: 0
COUGH: 0

## 2025-03-13 NOTE — PROGRESS NOTES
[facility]      3/13/2025    Patient Name: Kalina Bullock  :  1950    Subjective:    Chief Complaint:  Chief Complaint   Patient presents with    New Patient     Patient is here  as a new patient to establish care and discuss lipid panel from 2024         HPI Here to establish care, was previously seeing Dr. Rhodes at [facility]; she had labs done last August and results were reviewed with her in detail today;   She has hx of L rotator cuff arthropathy, saw Dr. Thornton in November at [facility]; treatment options discussed, and she elected to proceed with surgery; she is working on scheduling this; records reviewed; she is scheduled for surgery in May;   She has menopausal flushing, saw Dr. Quick in November; she was prescribed Climara patch weekly; she is to f/u in 3 months; records reviewed;    She has hyperthyroidism, osteoporosis, saw Dr. Davis in November for f/u; she is to continue Methimazole as prescribed; Prolia injection ordered; she is to f/u in 6 months; records reviewed; she takes daily calcium and vitamin D supplement; she gardens and keeps active;   She has seasonal allergies, taking Astelin nose spray, which is helpful;   She has GERD, taking Omeprazole daily and avoids spicy foods;     Past Medical History:   Diagnosis Date    Anxiety disorder     Chronic rhinitis     COVID-19 2024    Dysfunction of eustachian tube     Gastroesophageal reflux disease     History of bone density study 10/2024    osteoporosis    History of mammogram 2024    Hyperlipidemia     Hyperthyroidism     Mild mitral regurgitation     Osteoarthritis     Osteopenia     Seasonal allergic rhinitis     Tinnitus        Past Surgical History:   Procedure Laterality Date    COLONOSCOPY      diverticulosis, internal hemorrhoids (Dr. Whiting)    HYSTERECTOMY, TOTAL ABDOMINAL (CERVIX REMOVED)      HYSTERECTOMY, VAGINAL      JOINT REPLACEMENT  2016 & 2018    Both hips have been replaced

## 2025-05-19 DIAGNOSIS — E05.90 HYPERTHYROIDISM: ICD-10-CM

## 2025-05-19 DIAGNOSIS — M81.0 POSTMENOPAUSAL OSTEOPOROSIS: ICD-10-CM

## 2025-05-19 LAB
25(OH)D3 SERPL-MCNC: 68.6 NG/ML (ref 30–100)
ALBUMIN SERPL-MCNC: 3.7 G/DL (ref 3.2–4.6)
ALBUMIN/GLOB SERPL: 1.5 (ref 1–1.9)
ALP SERPL-CCNC: 80 U/L (ref 35–104)
ALT SERPL-CCNC: 45 U/L (ref 8–45)
ANION GAP SERPL CALC-SCNC: 9 MMOL/L (ref 7–16)
AST SERPL-CCNC: 33 U/L (ref 15–37)
BILIRUB DIRECT SERPL-MCNC: 0.3 MG/DL (ref 0–0.3)
BILIRUB SERPL-MCNC: 0.7 MG/DL (ref 0–1.2)
BUN SERPL-MCNC: 16 MG/DL (ref 8–23)
CALCIUM SERPL-MCNC: 9.8 MG/DL (ref 8.8–10.2)
CHLORIDE SERPL-SCNC: 103 MMOL/L (ref 98–107)
CO2 SERPL-SCNC: 27 MMOL/L (ref 20–29)
CREAT SERPL-MCNC: 1.02 MG/DL (ref 0.6–1.1)
GLOBULIN SER CALC-MCNC: 2.5 G/DL (ref 2.3–3.5)
GLUCOSE SERPL-MCNC: 73 MG/DL (ref 70–99)
POTASSIUM SERPL-SCNC: 4.5 MMOL/L (ref 3.5–5.1)
PROT SERPL-MCNC: 6.2 G/DL (ref 6.3–8.2)
SODIUM SERPL-SCNC: 139 MMOL/L (ref 136–145)
T3 SERPL-MCNC: 0.84 NG/ML (ref 0.6–1.81)
T4 FREE SERPL-MCNC: 1.2 NG/DL (ref 0.9–1.7)
TSH, 3RD GENERATION: 0.75 UIU/ML (ref 0.27–4.2)

## 2025-05-20 ASSESSMENT — ENCOUNTER SYMPTOMS
DIARRHEA: 0
VOICE CHANGE: 0
CONSTIPATION: 0

## 2025-05-20 NOTE — PROGRESS NOTES
JESSICA Davis MD, [facility] ENDOCRINOLOGY   AND   THYROID NODULE CLINIC            Reason for visit: Follow-up of hyperthyroidism      ASSESSMENT AND PLAN:    1. Hyperthyroidism  She is biochemically euthyroid.  Continue methimazole as prescribed.  I will provide her with longitudinal care for this problem.  - methIMAzole (TAPAZOLE) 5 MG tablet; Take 1 tablet by mouth daily  Dispense: 90 tablet; Refill: 3  - TSH; Future  - T4, Free; Future  - T3; Future    2. Thyroid nodule  Per ACR criteria, ongoing ultrasound follow-up is not necessary.    3. Postmenopausal osteoporosis  Her most recent Prolia dose was 1/14/2025.  Her next dose is already scheduled 7/15/2025.  This should be continued indefinitely.  - Comprehensive Metabolic Panel; Future  - Vitamin D 25 Hydroxy; Future      Follow-up and Dispositions    Return in about 6 months (around 11/21/2025).                       History of Present Illness:    THYROID DYSFUNCTION  Eve Bullock is seen for follow-up of hyperthyroidism; this was diagnosed in July 2019.       Current symptoms: See review of systems below     Prior treatment: Methimazole 5 mg daily was started 10/14/2019.  Her dose has been adjusted as follows:  -2.5 mg daily 12/17/2019.  -2.5 mg daily 3 days/week 1/31/2023  -2.5 mg daily 8/23/2023  -5 mg daily 10/3/2023     Pertinent labs:  6/26/2015: TSH 3.390, free T4 1.30.  7/1/2016: TSH 0.830, free T4 1.30.  7/6/2017: TSH 1.960, free T4 1.17.  7/19/2018: TSH 1.830, free T4 1.24.  7/30/2019: TSH 0.028, free T4 1.71.  8/23/2019: TSH 0.033, free T4 1.60, free T3 2.7.  10/14/2019: TSH 0.090, free T4 1.52, T3 109, thyrotropin receptor antibodies less than 1.10 (-).  12/16/2019: TSH 3.150, free T4 1.04, T3 88.  3/16/2020: TSH 3.410, free T4 1.11, T3 102.  7/20/2020: TSH 2.740, free T4 1.04, T3 97.  1/20/2021: TSH 3.440, free T4 1.20, T3 102.  7/26/2021: TSH 2.250, free T4 1.30, T3 125.  8/11/2021: TSH 1.460.  1/27/2022: TSH 3.220, free T4

## 2025-05-21 ENCOUNTER — OFFICE VISIT (OUTPATIENT)
Dept: ENDOCRINOLOGY | Age: 75
End: 2025-05-21
Payer: MEDICARE

## 2025-05-21 VITALS
SYSTOLIC BLOOD PRESSURE: 115 MMHG | WEIGHT: 157 LBS | HEIGHT: 65 IN | DIASTOLIC BLOOD PRESSURE: 70 MMHG | HEART RATE: 71 BPM | BODY MASS INDEX: 26.16 KG/M2 | OXYGEN SATURATION: 99 %

## 2025-05-21 DIAGNOSIS — E04.1 THYROID NODULE: ICD-10-CM

## 2025-05-21 DIAGNOSIS — M81.0 POSTMENOPAUSAL OSTEOPOROSIS: ICD-10-CM

## 2025-05-21 DIAGNOSIS — E05.90 HYPERTHYROIDISM: Primary | ICD-10-CM

## 2025-05-21 PROCEDURE — 1159F MED LIST DOCD IN RCRD: CPT | Performed by: INTERNAL MEDICINE

## 2025-05-21 PROCEDURE — 99214 OFFICE O/P EST MOD 30 MIN: CPT | Performed by: INTERNAL MEDICINE

## 2025-05-21 PROCEDURE — 1123F ACP DISCUSS/DSCN MKR DOCD: CPT | Performed by: INTERNAL MEDICINE

## 2025-05-21 PROCEDURE — G2211 COMPLEX E/M VISIT ADD ON: HCPCS | Performed by: INTERNAL MEDICINE

## 2025-05-21 RX ORDER — METHIMAZOLE 5 MG/1
5 TABLET ORAL DAILY
Qty: 90 TABLET | Refills: 3 | Status: SHIPPED | OUTPATIENT
Start: 2025-05-21

## 2025-05-21 ASSESSMENT — ENCOUNTER SYMPTOMS: TROUBLE SWALLOWING: 0

## 2025-06-13 ENCOUNTER — LAB (OUTPATIENT)
Dept: INTERNAL MEDICINE CLINIC | Facility: CLINIC | Age: 75
End: 2025-06-13

## 2025-06-13 DIAGNOSIS — E78.2 MIXED HYPERLIPIDEMIA: ICD-10-CM

## 2025-06-13 DIAGNOSIS — M81.0 POSTMENOPAUSAL OSTEOPOROSIS: ICD-10-CM

## 2025-06-13 DIAGNOSIS — R79.9 ABNORMAL BLOOD CHEMISTRY: ICD-10-CM

## 2025-06-13 DIAGNOSIS — E05.90 HYPERTHYROIDISM: Chronic | ICD-10-CM

## 2025-06-13 DIAGNOSIS — K21.9 GASTROESOPHAGEAL REFLUX DISEASE WITHOUT ESOPHAGITIS: ICD-10-CM

## 2025-06-13 LAB
ALBUMIN SERPL-MCNC: 3.9 G/DL (ref 3.2–4.6)
ALBUMIN/GLOB SERPL: 1.4 (ref 1–1.9)
ALP SERPL-CCNC: 69 U/L (ref 35–104)
ALT SERPL-CCNC: 49 U/L (ref 8–45)
ANION GAP SERPL CALC-SCNC: 9 MMOL/L (ref 7–16)
APPEARANCE UR: CLEAR
AST SERPL-CCNC: 47 U/L (ref 15–37)
BASOPHILS # BLD: 0.02 K/UL (ref 0–0.2)
BASOPHILS NFR BLD: 0.5 % (ref 0–2)
BILIRUB SERPL-MCNC: 0.6 MG/DL (ref 0–1.2)
BILIRUB UR QL: NEGATIVE
BUN SERPL-MCNC: 18 MG/DL (ref 8–23)
CALCIUM SERPL-MCNC: 9.7 MG/DL (ref 8.8–10.2)
CHLORIDE SERPL-SCNC: 106 MMOL/L (ref 98–107)
CHOLEST SERPL-MCNC: 176 MG/DL (ref 0–200)
CO2 SERPL-SCNC: 26 MMOL/L (ref 20–29)
COLOR UR: NORMAL
CREAT SERPL-MCNC: 1 MG/DL (ref 0.6–1.1)
DIFFERENTIAL METHOD BLD: ABNORMAL
EOSINOPHIL # BLD: 0.06 K/UL (ref 0–0.8)
EOSINOPHIL NFR BLD: 1.6 % (ref 0.5–7.8)
ERYTHROCYTE [DISTWIDTH] IN BLOOD BY AUTOMATED COUNT: 12.7 % (ref 11.9–14.6)
EST. AVERAGE GLUCOSE BLD GHB EST-MCNC: 107 MG/DL
GLOBULIN SER CALC-MCNC: 2.7 G/DL (ref 2.3–3.5)
GLUCOSE SERPL-MCNC: 90 MG/DL (ref 70–99)
GLUCOSE UR STRIP.AUTO-MCNC: NEGATIVE MG/DL
HBA1C MFR BLD: 5.4 % (ref 0–5.6)
HCT VFR BLD AUTO: 41.9 % (ref 35.8–46.3)
HDLC SERPL-MCNC: 50 MG/DL (ref 40–60)
HDLC SERPL: 3.5 (ref 0–5)
HGB BLD-MCNC: 13.6 G/DL (ref 11.7–15.4)
HGB UR QL STRIP: NEGATIVE
IMM GRANULOCYTES # BLD AUTO: 0.01 K/UL (ref 0–0.5)
IMM GRANULOCYTES NFR BLD AUTO: 0.3 % (ref 0–5)
KETONES UR QL STRIP.AUTO: NEGATIVE MG/DL
LDLC SERPL CALC-MCNC: 111 MG/DL (ref 0–100)
LEUKOCYTE ESTERASE UR QL STRIP.AUTO: NEGATIVE
LYMPHOCYTES # BLD: 1.55 K/UL (ref 0.5–4.6)
LYMPHOCYTES NFR BLD: 41.8 % (ref 13–44)
MCH RBC QN AUTO: 31.1 PG (ref 26.1–32.9)
MCHC RBC AUTO-ENTMCNC: 32.5 G/DL (ref 31.4–35)
MCV RBC AUTO: 95.9 FL (ref 82–102)
MONOCYTES # BLD: 0.28 K/UL (ref 0.1–1.3)
MONOCYTES NFR BLD: 7.5 % (ref 4–12)
NEUTS SEG # BLD: 1.79 K/UL (ref 1.7–8.2)
NEUTS SEG NFR BLD: 48.3 % (ref 43–78)
NITRITE UR QL STRIP.AUTO: NEGATIVE
NRBC # BLD: 0 K/UL (ref 0–0.2)
PH UR STRIP: 5.5 (ref 5–9)
PLATELET # BLD AUTO: 188 K/UL (ref 150–450)
PMV BLD AUTO: 11.7 FL (ref 9.4–12.3)
POTASSIUM SERPL-SCNC: 4.3 MMOL/L (ref 3.5–5.1)
PROT SERPL-MCNC: 6.6 G/DL (ref 6.3–8.2)
PROT UR STRIP-MCNC: NEGATIVE MG/DL
RBC # BLD AUTO: 4.37 M/UL (ref 4.05–5.2)
SODIUM SERPL-SCNC: 141 MMOL/L (ref 136–145)
SP GR UR REFRACTOMETRY: 1.02 (ref 1–1.02)
TRIGL SERPL-MCNC: 73 MG/DL (ref 0–150)
TSH, 3RD GENERATION: 0.97 UIU/ML (ref 0.27–4.2)
UROBILINOGEN UR QL STRIP.AUTO: 0.2 EU/DL (ref 0.2–1)
VLDLC SERPL CALC-MCNC: 15 MG/DL (ref 6–23)
WBC # BLD AUTO: 3.7 K/UL (ref 4.3–11.1)

## 2025-06-16 ENCOUNTER — RESULTS FOLLOW-UP (OUTPATIENT)
Dept: INTERNAL MEDICINE CLINIC | Facility: CLINIC | Age: 75
End: 2025-06-16

## 2025-06-22 SDOH — ECONOMIC STABILITY: INCOME INSECURITY: IN THE LAST 12 MONTHS, WAS THERE A TIME WHEN YOU WERE NOT ABLE TO PAY THE MORTGAGE OR RENT ON TIME?: NO

## 2025-06-22 SDOH — ECONOMIC STABILITY: FOOD INSECURITY: WITHIN THE PAST 12 MONTHS, YOU WORRIED THAT YOUR FOOD WOULD RUN OUT BEFORE YOU GOT MONEY TO BUY MORE.: NEVER TRUE

## 2025-06-22 SDOH — HEALTH STABILITY: PHYSICAL HEALTH: ON AVERAGE, HOW MANY DAYS PER WEEK DO YOU ENGAGE IN MODERATE TO STRENUOUS EXERCISE (LIKE A BRISK WALK)?: 3 DAYS

## 2025-06-22 SDOH — HEALTH STABILITY: PHYSICAL HEALTH: ON AVERAGE, HOW MANY MINUTES DO YOU ENGAGE IN EXERCISE AT THIS LEVEL?: 30 MIN

## 2025-06-22 SDOH — ECONOMIC STABILITY: FOOD INSECURITY: WITHIN THE PAST 12 MONTHS, THE FOOD YOU BOUGHT JUST DIDN'T LAST AND YOU DIDN'T HAVE MONEY TO GET MORE.: NEVER TRUE

## 2025-06-22 SDOH — ECONOMIC STABILITY: TRANSPORTATION INSECURITY
IN THE PAST 12 MONTHS, HAS THE LACK OF TRANSPORTATION KEPT YOU FROM MEDICAL APPOINTMENTS OR FROM GETTING MEDICATIONS?: NO

## 2025-06-22 ASSESSMENT — PATIENT HEALTH QUESTIONNAIRE - PHQ9
SUM OF ALL RESPONSES TO PHQ QUESTIONS 1-9: 0
SUM OF ALL RESPONSES TO PHQ QUESTIONS 1-9: 0
2. FEELING DOWN, DEPRESSED OR HOPELESS: NOT AT ALL
SUM OF ALL RESPONSES TO PHQ QUESTIONS 1-9: 0
SUM OF ALL RESPONSES TO PHQ QUESTIONS 1-9: 0
1. LITTLE INTEREST OR PLEASURE IN DOING THINGS: NOT AT ALL

## 2025-06-22 ASSESSMENT — LIFESTYLE VARIABLES
HOW OFTEN DO YOU HAVE A DRINK CONTAINING ALCOHOL: 4
HOW MANY STANDARD DRINKS CONTAINING ALCOHOL DO YOU HAVE ON A TYPICAL DAY: 1
HOW OFTEN DO YOU HAVE A DRINK CONTAINING ALCOHOL: 2-3 TIMES A WEEK
HOW MANY STANDARD DRINKS CONTAINING ALCOHOL DO YOU HAVE ON A TYPICAL DAY: 1 OR 2
HOW OFTEN DO YOU HAVE SIX OR MORE DRINKS ON ONE OCCASION: 1

## 2025-06-25 ENCOUNTER — OFFICE VISIT (OUTPATIENT)
Dept: INTERNAL MEDICINE CLINIC | Facility: CLINIC | Age: 75
End: 2025-06-25
Payer: MEDICARE

## 2025-06-25 VITALS
DIASTOLIC BLOOD PRESSURE: 68 MMHG | SYSTOLIC BLOOD PRESSURE: 120 MMHG | OXYGEN SATURATION: 98 % | TEMPERATURE: 97.2 F | HEART RATE: 75 BPM | HEIGHT: 65 IN | WEIGHT: 152.4 LBS | BODY MASS INDEX: 25.39 KG/M2

## 2025-06-25 DIAGNOSIS — Z00.00 MEDICARE ANNUAL WELLNESS VISIT, SUBSEQUENT: Primary | ICD-10-CM

## 2025-06-25 DIAGNOSIS — M81.0 POSTMENOPAUSAL OSTEOPOROSIS: ICD-10-CM

## 2025-06-25 DIAGNOSIS — K21.9 GASTROESOPHAGEAL REFLUX DISEASE WITHOUT ESOPHAGITIS: ICD-10-CM

## 2025-06-25 DIAGNOSIS — Z12.31 VISIT FOR SCREENING MAMMOGRAM: ICD-10-CM

## 2025-06-25 DIAGNOSIS — E78.2 MIXED HYPERLIPIDEMIA: ICD-10-CM

## 2025-06-25 DIAGNOSIS — E05.90 HYPERTHYROIDISM: Chronic | ICD-10-CM

## 2025-06-25 DIAGNOSIS — R79.89 ELEVATED LFTS: ICD-10-CM

## 2025-06-25 PROCEDURE — 99214 OFFICE O/P EST MOD 30 MIN: CPT | Performed by: INTERNAL MEDICINE

## 2025-06-25 PROCEDURE — 1160F RVW MEDS BY RX/DR IN RCRD: CPT | Performed by: INTERNAL MEDICINE

## 2025-06-25 PROCEDURE — 1123F ACP DISCUSS/DSCN MKR DOCD: CPT | Performed by: INTERNAL MEDICINE

## 2025-06-25 PROCEDURE — G0439 PPPS, SUBSEQ VISIT: HCPCS | Performed by: INTERNAL MEDICINE

## 2025-06-25 PROCEDURE — 1159F MED LIST DOCD IN RCRD: CPT | Performed by: INTERNAL MEDICINE

## 2025-06-25 RX ORDER — ESTRADIOL 0.04 MG/D
1 PATCH TRANSDERMAL WEEKLY
COMMUNITY
Start: 2025-04-18

## 2025-06-25 ASSESSMENT — ENCOUNTER SYMPTOMS
ABDOMINAL PAIN: 0
RHINORRHEA: 0
SINUS PRESSURE: 0
COUGH: 0
VOMITING: 0
SHORTNESS OF BREATH: 0
CHEST TIGHTNESS: 0
DIARRHEA: 0
BLOOD IN STOOL: 0
NAUSEA: 0

## 2025-06-25 NOTE — PROGRESS NOTES
[facility]      2025    Patient Name: Kalina Bullock  :  1950    Subjective:    Chief Complaint:  Chief Complaint   Patient presents with    Medicare AWV         HPI Here for Medicare Wellness visit; patient had fasting labs done recently and results were reviewed in detail today; she was seen as a new patient in March;   She has hx of hyperthyroidism, saw Dr. Davis for f/u last month; she is to continue Methimazole as prescribed; she has osteoporosis, received Prolia 25 and is scheduled for next dose 7/15/25; she is to f/u in 6 months; records reviewed; she takes daily calcium and vitamin D supplement, works in her garden and keeps active;   She generally follows a healthy diet, recently got back from a trip to [facility];   Mammogram: 2024  Pap smear: s/p hysterectomy  Bone density: 10/2024, osteoporosis  Colonoscopy:   Eye exam: 2025  Dental exam: 2025  Pneumovax 23: 2014  Prevnar 13: 2015  Prevnar 20: 202  Shingrix: 2019  Tdap:   Fluvax: 2024  RSV: 2024  Pfizer Covid 19 booster: 2024    Past Medical History:   Diagnosis Date    Anxiety disorder     Chronic rhinitis     COVID-19 2024    Dysfunction of eustachian tube     Gastroesophageal reflux disease     History of bone density study 10/2024    osteoporosis    History of mammogram 2024    Hyperlipidemia     Hyperthyroidism     Mild mitral regurgitation     Osteoarthritis     Osteopenia     Seasonal allergic rhinitis     Tinnitus        Past Surgical History:   Procedure Laterality Date    COLONOSCOPY      diverticulosis, internal hemorrhoids (Dr. Whiting)    HYSTERECTOMY, TOTAL ABDOMINAL (CERVIX REMOVED)      HYSTERECTOMY, VAGINAL      JOINT REPLACEMENT  2016 & 2018    Both hips have been replaced    RAY AND BSO (CERVIX REMOVED)      TONSILLECTOMY  childhood    TOTAL HIP ARTHROPLASTY Right 2016    TOTAL HIP ARTHROPLASTY Left 2018    UPPER GASTROINTESTINAL ENDOSCOPY

## 2025-07-14 DIAGNOSIS — M81.0 POSTMENOPAUSAL OSTEOPOROSIS: Primary | ICD-10-CM

## 2025-07-14 LAB
ALBUMIN SERPL-MCNC: 4.1 G/DL (ref 3.2–4.6)
ALBUMIN/GLOB SERPL: 1.6 (ref 1–1.9)
ALP SERPL-CCNC: 83 U/L (ref 35–104)
ALT SERPL-CCNC: 22 U/L (ref 8–45)
ANION GAP SERPL CALC-SCNC: 11 MMOL/L (ref 7–16)
AST SERPL-CCNC: 24 U/L (ref 15–37)
BILIRUB SERPL-MCNC: 0.9 MG/DL (ref 0–1.2)
BUN SERPL-MCNC: 16 MG/DL (ref 8–23)
CALCIUM SERPL-MCNC: 10.5 MG/DL (ref 8.8–10.2)
CHLORIDE SERPL-SCNC: 103 MMOL/L (ref 98–107)
CO2 SERPL-SCNC: 25 MMOL/L (ref 20–29)
CREAT SERPL-MCNC: 1 MG/DL (ref 0.6–1.1)
GLOBULIN SER CALC-MCNC: 2.6 G/DL (ref 2.3–3.5)
GLUCOSE SERPL-MCNC: 92 MG/DL (ref 70–99)
POTASSIUM SERPL-SCNC: 4.4 MMOL/L (ref 3.5–5.1)
PROT SERPL-MCNC: 6.7 G/DL (ref 6.3–8.2)
SODIUM SERPL-SCNC: 139 MMOL/L (ref 136–145)

## 2025-07-15 ENCOUNTER — PATIENT MESSAGE (OUTPATIENT)
Dept: INTERNAL MEDICINE CLINIC | Facility: CLINIC | Age: 75
End: 2025-07-15

## 2025-07-15 ENCOUNTER — CLINICAL SUPPORT (OUTPATIENT)
Age: 75
End: 2025-07-15
Payer: MEDICARE

## 2025-07-15 VITALS
TEMPERATURE: 98.6 F | DIASTOLIC BLOOD PRESSURE: 71 MMHG | RESPIRATION RATE: 20 BRPM | HEART RATE: 74 BPM | OXYGEN SATURATION: 96 % | SYSTOLIC BLOOD PRESSURE: 112 MMHG

## 2025-07-15 DIAGNOSIS — M81.0 POSTMENOPAUSAL OSTEOPOROSIS: Primary | ICD-10-CM

## 2025-07-15 PROCEDURE — 96372 THER/PROPH/DIAG INJ SC/IM: CPT | Performed by: PSYCHIATRY & NEUROLOGY

## 2025-07-15 RX ORDER — ACETAMINOPHEN 325 MG/1
650 TABLET ORAL
OUTPATIENT
Start: 2026-01-12

## 2025-07-15 RX ORDER — HYDROCORTISONE SODIUM SUCCINATE 100 MG/2ML
100 INJECTION INTRAMUSCULAR; INTRAVENOUS
OUTPATIENT
Start: 2026-01-12

## 2025-07-15 RX ORDER — SODIUM CHLORIDE 9 MG/ML
INJECTION, SOLUTION INTRAVENOUS CONTINUOUS
OUTPATIENT
Start: 2026-01-12

## 2025-07-15 RX ORDER — ONDANSETRON 2 MG/ML
8 INJECTION INTRAMUSCULAR; INTRAVENOUS
OUTPATIENT
Start: 2026-01-12

## 2025-07-15 RX ORDER — ALBUTEROL SULFATE 90 UG/1
4 INHALANT RESPIRATORY (INHALATION) PRN
OUTPATIENT
Start: 2026-01-12

## 2025-07-15 RX ORDER — DIPHENHYDRAMINE HYDROCHLORIDE 50 MG/ML
50 INJECTION, SOLUTION INTRAMUSCULAR; INTRAVENOUS
OUTPATIENT
Start: 2026-01-12

## 2025-07-15 RX ORDER — EPINEPHRINE 1 MG/ML
0.3 INJECTION, SOLUTION, CONCENTRATE INTRAVENOUS PRN
OUTPATIENT
Start: 2026-01-12

## 2025-07-15 NOTE — PROGRESS NOTES
[facility]  [address], [address] [place] [address]  Office : [phone], Fax: [phone]        Osteoporosis pre-infusion/injection questionnaire:     1. In the past month, have you had or are you planning to have any dental surgery or major dental procedures?  No     2. Are you taking a calcium and vitamin D supplement? Yes     3. When was your last osteoporosis treatment?  01/14/2025     4. In the last year, have you had any fractures? Yes        Patient arrived ambulatory to infusion suite today for a repeat Prolia subcutaneous injection.  Pertinent labs drawn 07/14/2025 . Labs reviewed and are within medication administration parameters.  Prolia 60mg/ml injected SC into left arm. Patient tolerated injection well.    Advised patient to continue with calcium and vitamin D supplements post injection. Advised patient to call the ordering provider with any problems post injection.       Next Prolia appt scheduled 01/2026 prior to departure from infusion suite.     Pt ambulatory with steady gait out of infusion suite.

## (undated) DEVICE — MEDI-VAC YANKAUER SUCTION HANDLE W/BULBOUS TIP: Brand: CARDINAL HEALTH

## (undated) DEVICE — (D)PREP SKN CHLRAPRP APPL 26ML -- CONVERT TO ITEM 371833

## (undated) DEVICE — SUTURE ETHBND EXCEL SZ 5 L30IN NONABSORBABLE GRN L40MM V-37 MB66G

## (undated) DEVICE — REM POLYHESIVE ADULT PATIENT RETURN ELECTRODE: Brand: VALLEYLAB

## (undated) DEVICE — SUTURE VCRL SZ 1 L27IN ABSRB UD L36MM CP-1 1/2 CIR REV CUT J268H

## (undated) DEVICE — 3000CC GUARDIAN II: Brand: GUARDIAN

## (undated) DEVICE — BANDAGE COMPR SELF ADH 5 YDX4 IN TAN STRL PREMIERPRO LF

## (undated) DEVICE — NEEDLE SPNL 22GA L3.5IN BLK HUB S STL REG WALL FIT STYL W/

## (undated) DEVICE — DRAPE,HIP,W/POUCHES,STERILE: Brand: MEDLINE

## (undated) DEVICE — 2000CC GUARDIAN II: Brand: GUARDIAN

## (undated) DEVICE — SUTURE VCRL SZ 2-0 L27IN ABSRB UD L36MM CP-1 1/2 CIR REV J266H

## (undated) DEVICE — SYR LR LCK 1ML GRAD NSAF 30ML --

## (undated) DEVICE — Z DISCONTINUED USE 2744636  DRESSING AQUACEL 14 IN ALG W3.5XL14IN POLYUR FLM CVR W/ HYDRCOLL

## (undated) DEVICE — BUTTON SWITCH PENCIL BLADE ELECTRODE, HOLSTER: Brand: EDGE

## (undated) DEVICE — SUTURE VCRL SZ 1 L36IN ABSRB UD CTX L48MM 1/2 CIR J977H

## (undated) DEVICE — SOLUTION IRRIG 3000ML 0.9% SOD CHL FLX CONT 0797208] ICU MEDICAL INC]

## (undated) DEVICE — SURGICAL PROCEDURE PACK TOT HIP CDS

## (undated) DEVICE — HEWSON SUTURE RETRIEVER: Brand: HEWSON SUTURE RETRIEVER

## (undated) DEVICE — SKIN STAPLER: Brand: SIGNET

## (undated) DEVICE — MCLASS OSCILLATING SAW BLADE 19 X 1.27 (0.050") X 90 MM: Brand: MCLASS

## (undated) DEVICE — 1840 FOAM BLOCK NEEDLE COUNTER: Brand: DEVON

## (undated) DEVICE — T4 HOOD

## (undated) DEVICE — 3M™ IOBAN™ 2 ANTIMICROBIAL INCISE DRAPE 6651EZ: Brand: IOBAN™ 2

## (undated) DEVICE — TRAY CATH 16F DRN BG LTX -- CONVERT TO ITEM 363158

## (undated) DEVICE — SOLUTION IV 1000ML 0.9% SOD CHL

## (undated) DEVICE — TRAY PREP DRY W/ PREM GLV 2 APPL 6 SPNG 2 UNDPD 1 OVERWRAP

## (undated) DEVICE — FAN SPRAY KIT: Brand: PULSAVAC®

## (undated) DEVICE — SUTURE PDS II SZ 1 L54IN ABSRB VLT L65MM TP-1 1/2 CIR Z879G

## (undated) DEVICE — SUTURE STRATAFIX SYMMETRIC PDS + SZ 1 L18IN ABSRB VLT L48MM SXPP1A400